# Patient Record
Sex: MALE | Race: WHITE | Employment: OTHER | URBAN - METROPOLITAN AREA
[De-identification: names, ages, dates, MRNs, and addresses within clinical notes are randomized per-mention and may not be internally consistent; named-entity substitution may affect disease eponyms.]

---

## 2018-04-11 ENCOUNTER — HOSPITAL ENCOUNTER (OUTPATIENT)
Age: 68
Discharge: HOME | End: 2018-04-13

## 2018-04-11 LAB
ANION GAP SERPL CALCULATED.3IONS-SCNC: 17 MMOL/L (ref 7–16)
BUN BLDV-MCNC: 17 MG/DL (ref 8–23)
CALCIUM SERPL-MCNC: 9.8 MG/DL (ref 8.6–10.2)
CHLORIDE BLD-SCNC: 96 MMOL/L (ref 98–107)
CO2: 22 MMOL/L (ref 22–29)
CREAT SERPL-MCNC: 0.9 MG/DL (ref 0.7–1.2)
GFR AFRICAN AMERICAN: >60
GFR NON-AFRICAN AMERICAN: >60 ML/MIN/1.73
GLUCOSE BLD-MCNC: 176 MG/DL (ref 74–109)
HCT VFR BLD CALC: 42.8 % (ref 37–54)
HEMOGLOBIN: 14 G/DL (ref 12.5–16.5)
MCH RBC QN AUTO: 28.5 PG (ref 26–35)
MCHC RBC AUTO-ENTMCNC: 32.7 % (ref 32–34.5)
MCV RBC AUTO: 87 FL (ref 80–99.9)
PDW BLD-RTO: 13.6 FL (ref 11.5–15)
PLATELET # BLD: 291 E9/L (ref 130–450)
PMV BLD AUTO: 11.4 FL (ref 7–12)
POTASSIUM SERPL-SCNC: 3.7 MMOL/L (ref 3.5–5)
RBC # BLD: 4.92 E12/L (ref 3.8–5.8)
SODIUM BLD-SCNC: 135 MMOL/L (ref 132–146)
WBC # BLD: 8.2 E9/L (ref 4.5–11.5)

## 2018-04-11 PROCEDURE — 87088 URINE BACTERIA CULTURE: CPT

## 2018-04-11 PROCEDURE — 85027 COMPLETE CBC AUTOMATED: CPT

## 2018-04-11 PROCEDURE — 87081 CULTURE SCREEN ONLY: CPT

## 2018-04-11 PROCEDURE — 80048 BASIC METABOLIC PNL TOTAL CA: CPT

## 2018-04-13 LAB — MRSA CULTURE ONLY: NORMAL

## 2018-04-14 LAB — URINE CULTURE, ROUTINE: NORMAL

## 2018-04-25 ENCOUNTER — HOSPITAL ENCOUNTER (OUTPATIENT)
Age: 68
Discharge: HOME | End: 2018-04-27

## 2018-04-25 LAB
ABO/RH: NORMAL
ANTIBODY SCREEN: NORMAL

## 2018-04-25 PROCEDURE — 86901 BLOOD TYPING SEROLOGIC RH(D): CPT

## 2018-04-25 PROCEDURE — 86900 BLOOD TYPING SEROLOGIC ABO: CPT

## 2018-04-25 PROCEDURE — 86850 RBC ANTIBODY SCREEN: CPT

## 2018-04-26 ENCOUNTER — HOSPITAL ENCOUNTER (OUTPATIENT)
Age: 68
Discharge: HOME | End: 2018-04-28

## 2018-04-26 LAB
ANION GAP SERPL CALCULATED.3IONS-SCNC: 13 MMOL/L (ref 7–16)
BUN BLDV-MCNC: 17 MG/DL (ref 8–23)
CALCIUM SERPL-MCNC: 8.9 MG/DL (ref 8.6–10.2)
CHLORIDE BLD-SCNC: 96 MMOL/L (ref 98–107)
CO2: 23 MMOL/L (ref 22–29)
CREAT SERPL-MCNC: 1 MG/DL (ref 0.7–1.2)
GFR AFRICAN AMERICAN: >60
GFR NON-AFRICAN AMERICAN: >60 ML/MIN/1.73
GLUCOSE BLD-MCNC: 137 MG/DL (ref 74–109)
HCT VFR BLD CALC: 33.5 % (ref 37–54)
HEMOGLOBIN: 11 G/DL (ref 12.5–16.5)
POTASSIUM SERPL-SCNC: 4.7 MMOL/L (ref 3.5–5)
SODIUM BLD-SCNC: 132 MMOL/L (ref 132–146)

## 2018-04-26 PROCEDURE — 85014 HEMATOCRIT: CPT

## 2018-04-26 PROCEDURE — 80048 BASIC METABOLIC PNL TOTAL CA: CPT

## 2018-04-26 PROCEDURE — 85018 HEMOGLOBIN: CPT

## 2018-07-07 ENCOUNTER — HOSPITAL ENCOUNTER (EMERGENCY)
Age: 68
Discharge: HOME | End: 2018-07-07
Attending: EMERGENCY MEDICINE
Payer: COMMERCIAL

## 2018-07-07 VITALS
SYSTOLIC BLOOD PRESSURE: 130 MMHG | TEMPERATURE: 98 F | HEIGHT: 69 IN | BODY MASS INDEX: 36.58 KG/M2 | WEIGHT: 247 LBS | RESPIRATION RATE: 18 BRPM | DIASTOLIC BLOOD PRESSURE: 76 MMHG | OXYGEN SATURATION: 97 % | HEART RATE: 76 BPM

## 2018-07-07 DIAGNOSIS — R25.2 LEG CRAMPING: Primary | ICD-10-CM

## 2018-07-07 PROCEDURE — 6360000002 HC RX W HCPCS: Performed by: EMERGENCY MEDICINE

## 2018-07-07 PROCEDURE — 99282 EMERGENCY DEPT VISIT SF MDM: CPT

## 2018-07-07 PROCEDURE — 96372 THER/PROPH/DIAG INJ SC/IM: CPT

## 2018-07-07 RX ORDER — IBUPROFEN 800 MG/1
800 TABLET ORAL EVERY 6 HOURS PRN
COMMUNITY
End: 2018-12-24 | Stop reason: ALTCHOICE

## 2018-07-07 RX ORDER — ORPHENADRINE CITRATE 30 MG/ML
60 INJECTION INTRAMUSCULAR; INTRAVENOUS ONCE
Status: COMPLETED | OUTPATIENT
Start: 2018-07-07 | End: 2018-07-07

## 2018-07-07 RX ORDER — ORPHENADRINE CITRATE 100 MG/1
100 TABLET, EXTENDED RELEASE ORAL 2 TIMES DAILY
Qty: 20 TABLET | Refills: 0 | Status: SHIPPED | OUTPATIENT
Start: 2018-07-07 | End: 2018-07-10 | Stop reason: ALTCHOICE

## 2018-07-07 RX ORDER — KETOROLAC TROMETHAMINE 30 MG/ML
30 INJECTION, SOLUTION INTRAMUSCULAR; INTRAVENOUS ONCE
Status: COMPLETED | OUTPATIENT
Start: 2018-07-07 | End: 2018-07-07

## 2018-07-07 RX ADMIN — ORPHENADRINE CITRATE 60 MG: 30 INJECTION INTRAMUSCULAR; INTRAVENOUS at 02:49

## 2018-07-07 RX ADMIN — KETOROLAC TROMETHAMINE 30 MG: 30 INJECTION, SOLUTION INTRAMUSCULAR at 02:49

## 2018-07-07 ASSESSMENT — PAIN DESCRIPTION - ORIENTATION
ORIENTATION: RIGHT;LEFT;LOWER
ORIENTATION: RIGHT;LEFT;LOWER

## 2018-07-07 ASSESSMENT — PAIN - FUNCTIONAL ASSESSMENT: PAIN_FUNCTIONAL_ASSESSMENT: 0-10

## 2018-07-07 ASSESSMENT — PAIN DESCRIPTION - PAIN TYPE
TYPE: ACUTE PAIN
TYPE: ACUTE PAIN

## 2018-07-07 ASSESSMENT — PAIN DESCRIPTION - PROGRESSION
CLINICAL_PROGRESSION: NOT CHANGED
CLINICAL_PROGRESSION: RAPIDLY IMPROVING

## 2018-07-07 ASSESSMENT — PAIN DESCRIPTION - DESCRIPTORS
DESCRIPTORS: ACHING
DESCRIPTORS: ACHING

## 2018-07-07 ASSESSMENT — PAIN DESCRIPTION - ONSET: ONSET: ON-GOING

## 2018-07-07 ASSESSMENT — PAIN SCALES - GENERAL
PAINLEVEL_OUTOF10: 8
PAINLEVEL_OUTOF10: 8
PAINLEVEL_OUTOF10: 2

## 2018-07-07 ASSESSMENT — PAIN DESCRIPTION - FREQUENCY
FREQUENCY: CONTINUOUS
FREQUENCY: INTERMITTENT

## 2018-07-07 ASSESSMENT — PAIN DESCRIPTION - LOCATION
LOCATION: LEG
LOCATION: LEG

## 2018-07-07 NOTE — ED PROVIDER NOTES
Constitutional:  Well developed, well nourished, no acute distress, non-toxic appearance , vitals are normal  Eyes: No scleral icterus, no conjunctival injection  HENT:  Atraumatic, external ears normal, nose normal, oropharynx moist. Neck- normal range of motion, no tenderness, supple   Respiratory:  No respiratory distress,   GI:  No abdominal distention  Musculoskeletal:  No edema, no deformities  Bilateral lower extremities-no swelling or deformity noted, no evidence of DVT, has some bilateral calf tenderness, has a scar on the left knee secondary to previous knee surgery. Capillary refill and sensation is intact distally without evidence of neurovascular compromise. .  Integument:  Well hydrated, no rash. Neurologic:  Alert, answers all questions appropriately ,no focal motor deficits noted. Psychiatric:  Speech and behavior appropriate                 ------------------------- NURSING NOTES AND VITALS REVIEWED ---------------------------   The nursing notes within the ED encounter and vital signs as below have been reviewed by myself. /76   Pulse 76   Temp 98 °F (36.7 °C) (Oral)   Resp 18   Ht 5' 9\" (1.753 m)   Wt 247 lb (112 kg)   SpO2 97%   BMI 36.48 kg/m²   Oxygen Saturation Interpretation: Normal    The patients available past medical records and past encounters were reviewed. ------------------------------ ED COURSE/MEDICAL DECISION MAKING----------------------  Medications   ketorolac (TORADOL) injection 30 mg (30 mg Intramuscular Given 7/7/18 0249)   orphenadrine (NORFLEX) injection 60 mg (60 mg Intramuscular Given 7/7/18 0249)       Medical Decision Making:    Has bilateral leg cramping/throbbing. No swelling and no evidence of DVT. No evidence of vascular or nerve compromise. Patient given injection of Toradol and Norflex with significant relief of his pain. He states his legs feel a little cold now but her nor having the pain.   Discharged home with prescription for

## 2018-12-03 ENCOUNTER — HOSPITAL ENCOUNTER (OUTPATIENT)
Age: 68
Discharge: HOME | End: 2018-12-05
Payer: COMMERCIAL

## 2018-12-03 PROCEDURE — 88305 TISSUE EXAM BY PATHOLOGIST: CPT

## 2018-12-24 ENCOUNTER — HOSPITAL ENCOUNTER (EMERGENCY)
Age: 68
Discharge: HOME | End: 2018-12-24
Attending: EMERGENCY MEDICINE
Payer: COMMERCIAL

## 2018-12-24 VITALS
TEMPERATURE: 97.5 F | WEIGHT: 252 LBS | DIASTOLIC BLOOD PRESSURE: 80 MMHG | HEIGHT: 70 IN | RESPIRATION RATE: 18 BRPM | SYSTOLIC BLOOD PRESSURE: 146 MMHG | OXYGEN SATURATION: 95 % | HEART RATE: 72 BPM | BODY MASS INDEX: 36.08 KG/M2

## 2018-12-24 DIAGNOSIS — M79.605 LEFT LEG PAIN: Primary | ICD-10-CM

## 2018-12-24 DIAGNOSIS — M79.604 RIGHT LEG PAIN: ICD-10-CM

## 2018-12-24 LAB
ANION GAP SERPL CALCULATED.3IONS-SCNC: 14 MMOL/L (ref 7–16)
BASOPHILS ABSOLUTE: 0.05 E9/L (ref 0–0.2)
BASOPHILS RELATIVE PERCENT: 0.7 % (ref 0–2)
BUN BLDV-MCNC: 17 MG/DL (ref 8–23)
CALCIUM SERPL-MCNC: 9.1 MG/DL (ref 8.6–10.2)
CHLORIDE BLD-SCNC: 98 MMOL/L (ref 98–107)
CO2: 26 MMOL/L (ref 22–29)
CREAT SERPL-MCNC: 1.1 MG/DL (ref 0.7–1.2)
EOSINOPHILS ABSOLUTE: 0.17 E9/L (ref 0.05–0.5)
EOSINOPHILS RELATIVE PERCENT: 2.3 % (ref 0–6)
GFR AFRICAN AMERICAN: >60
GFR NON-AFRICAN AMERICAN: >60 ML/MIN/1.73
GLUCOSE BLD-MCNC: 209 MG/DL (ref 74–99)
HCT VFR BLD CALC: 36.7 % (ref 37–54)
HEMOGLOBIN: 11.8 G/DL (ref 12.5–16.5)
IMMATURE GRANULOCYTES #: 0.05 E9/L
IMMATURE GRANULOCYTES %: 0.7 % (ref 0–5)
LYMPHOCYTES ABSOLUTE: 2.84 E9/L (ref 1.5–4)
LYMPHOCYTES RELATIVE PERCENT: 38.3 % (ref 20–42)
MCH RBC QN AUTO: 28.7 PG (ref 26–35)
MCHC RBC AUTO-ENTMCNC: 32.2 % (ref 32–34.5)
MCV RBC AUTO: 89.3 FL (ref 80–99.9)
MONOCYTES ABSOLUTE: 0.64 E9/L (ref 0.1–0.95)
MONOCYTES RELATIVE PERCENT: 8.6 % (ref 2–12)
NEUTROPHILS ABSOLUTE: 3.66 E9/L (ref 1.8–7.3)
NEUTROPHILS RELATIVE PERCENT: 49.4 % (ref 43–80)
PDW BLD-RTO: 13.1 FL (ref 11.5–15)
PLATELET # BLD: 233 E9/L (ref 130–450)
PMV BLD AUTO: 10.7 FL (ref 7–12)
POTASSIUM SERPL-SCNC: 4.2 MMOL/L (ref 3.5–5)
RBC # BLD: 4.11 E12/L (ref 3.8–5.8)
SODIUM BLD-SCNC: 138 MMOL/L (ref 132–146)
WBC # BLD: 7.4 E9/L (ref 4.5–11.5)

## 2018-12-24 PROCEDURE — 80048 BASIC METABOLIC PNL TOTAL CA: CPT

## 2018-12-24 PROCEDURE — 36415 COLL VENOUS BLD VENIPUNCTURE: CPT

## 2018-12-24 PROCEDURE — 6360000002 HC RX W HCPCS: Performed by: EMERGENCY MEDICINE

## 2018-12-24 PROCEDURE — 96372 THER/PROPH/DIAG INJ SC/IM: CPT

## 2018-12-24 PROCEDURE — 99283 EMERGENCY DEPT VISIT LOW MDM: CPT

## 2018-12-24 PROCEDURE — 85025 COMPLETE CBC W/AUTO DIFF WBC: CPT

## 2018-12-24 RX ORDER — METHOCARBAMOL 500 MG/1
500 TABLET, FILM COATED ORAL 4 TIMES DAILY
Qty: 40 TABLET | Refills: 0 | Status: SHIPPED | OUTPATIENT
Start: 2018-12-24 | End: 2019-01-03

## 2018-12-24 RX ORDER — ACETAMINOPHEN 325 MG/1
650 TABLET ORAL EVERY 6 HOURS PRN
COMMUNITY

## 2018-12-24 RX ORDER — MORPHINE SULFATE 2 MG/ML
2 INJECTION, SOLUTION INTRAMUSCULAR; INTRAVENOUS ONCE
Status: COMPLETED | OUTPATIENT
Start: 2018-12-24 | End: 2018-12-24

## 2018-12-24 RX ADMIN — MORPHINE SULFATE 2 MG: 2 INJECTION, SOLUTION INTRAMUSCULAR; INTRAVENOUS at 01:22

## 2018-12-24 ASSESSMENT — PAIN DESCRIPTION - ORIENTATION
ORIENTATION: RIGHT;LEFT
ORIENTATION: RIGHT

## 2018-12-24 ASSESSMENT — PAIN DESCRIPTION - LOCATION
LOCATION: LEG
LOCATION: LEG

## 2018-12-24 ASSESSMENT — PAIN DESCRIPTION - DESCRIPTORS: DESCRIPTORS: DISCOMFORT

## 2018-12-24 ASSESSMENT — PAIN DESCRIPTION - PAIN TYPE: TYPE: ACUTE PAIN

## 2018-12-24 ASSESSMENT — PAIN SCALES - GENERAL
PAINLEVEL_OUTOF10: 10
PAINLEVEL_OUTOF10: 2

## 2018-12-24 NOTE — ED NOTES
Instructions provided and questions answered. Prescriptions verified with patient.       Julius Bowie RN  12/24/18 5254

## 2018-12-24 NOTE — ED NOTES
C/o legs constantly moving without requip. Had flexeril and pain med last visit and it worked well.      Papo Schwartz RN  12/24/18 3970

## 2018-12-24 NOTE — ED PROVIDER NOTES
reviewed. Allergies: Vicodin [hydrocodone-acetaminophen]        ---------------------------------------------------PHYSICAL EXAM--------------------------------------    Constitutional:  Well developed, well nourished, no acute distress, non-toxic appearance   Musculoskeletal:  No edema, no tenderness, no deformities. Back- no tenderness  Integument:  Well hydrated, no rash. Adequate perfusion. Lymphatic:  No lymphadenopathy noted   Neurologic:  Alert & oriented x 3, CN 2-12 normal, normal motor function, normal sensory function, no focal deficits noted. Psychiatric:  Speech and behavior appropriate       -------------------------------------------------- RESULTS -------------------------------------------------  I have personally reviewed all laboratory and imaging results for this patient. Results are listed below.      LABS:  Results for orders placed or performed during the hospital encounter of 12/24/18   CBC Auto Differential   Result Value Ref Range    WBC 7.4 4.5 - 11.5 E9/L    RBC 4.11 3.80 - 5.80 E12/L    Hemoglobin 11.8 (L) 12.5 - 16.5 g/dL    Hematocrit 36.7 (L) 37.0 - 54.0 %    MCV 89.3 80.0 - 99.9 fL    MCH 28.7 26.0 - 35.0 pg    MCHC 32.2 32.0 - 34.5 %    RDW 13.1 11.5 - 15.0 fL    Platelets 986 972 - 066 E9/L    MPV 10.7 7.0 - 12.0 fL    Neutrophils % 49.4 43.0 - 80.0 %    Immature Granulocytes % 0.7 0.0 - 5.0 %    Lymphocytes % 38.3 20.0 - 42.0 %    Monocytes % 8.6 2.0 - 12.0 %    Eosinophils % 2.3 0.0 - 6.0 %    Basophils % 0.7 0.0 - 2.0 %    Neutrophils # 3.66 1.80 - 7.30 E9/L    Immature Granulocytes # 0.05 E9/L    Lymphocytes # 2.84 1.50 - 4.00 E9/L    Monocytes # 0.64 0.10 - 0.95 E9/L    Eosinophils # 0.17 0.05 - 0.50 E9/L    Basophils # 0.05 0.00 - 0.20 O2/R   Basic Metabolic Panel   Result Value Ref Range    Sodium 138 132 - 146 mmol/L    Potassium 4.2 3.5 - 5.0 mmol/L    Chloride 98 98 - 107 mmol/L    CO2 26 22 - 29 mmol/L    Anion Gap 14 7 - 16 mmol/L    Glucose 209 (H) 74 - 99

## 2019-03-30 ENCOUNTER — HOSPITAL ENCOUNTER (EMERGENCY)
Age: 69
Discharge: HOME | End: 2019-03-30
Payer: COMMERCIAL

## 2019-03-30 VITALS
BODY MASS INDEX: 38.51 KG/M2 | WEIGHT: 260 LBS | HEART RATE: 96 BPM | TEMPERATURE: 98.7 F | RESPIRATION RATE: 18 BRPM | HEIGHT: 69 IN | SYSTOLIC BLOOD PRESSURE: 110 MMHG | DIASTOLIC BLOOD PRESSURE: 68 MMHG | OXYGEN SATURATION: 96 %

## 2019-03-30 DIAGNOSIS — M54.50 ACUTE EXACERBATION OF CHRONIC LOW BACK PAIN: Primary | ICD-10-CM

## 2019-03-30 DIAGNOSIS — G89.29 ACUTE EXACERBATION OF CHRONIC LOW BACK PAIN: Primary | ICD-10-CM

## 2019-03-30 PROCEDURE — 96372 THER/PROPH/DIAG INJ SC/IM: CPT

## 2019-03-30 PROCEDURE — 99283 EMERGENCY DEPT VISIT LOW MDM: CPT

## 2019-03-30 PROCEDURE — 6360000002 HC RX W HCPCS: Performed by: NURSE PRACTITIONER

## 2019-03-30 RX ORDER — KETOROLAC TROMETHAMINE 30 MG/ML
30 INJECTION, SOLUTION INTRAMUSCULAR; INTRAVENOUS ONCE
Status: COMPLETED | OUTPATIENT
Start: 2019-03-30 | End: 2019-03-30

## 2019-03-30 RX ORDER — MORPHINE SULFATE 4 MG/ML
4 INJECTION, SOLUTION INTRAMUSCULAR; INTRAVENOUS ONCE
Status: COMPLETED | OUTPATIENT
Start: 2019-03-30 | End: 2019-03-30

## 2019-03-30 RX ADMIN — KETOROLAC TROMETHAMINE 30 MG: 30 INJECTION INTRAMUSCULAR; INTRAVENOUS at 21:00

## 2019-03-30 RX ADMIN — MORPHINE SULFATE 4 MG: 4 INJECTION INTRAVENOUS at 21:00

## 2019-03-30 ASSESSMENT — PAIN SCALES - GENERAL
PAINLEVEL_OUTOF10: 5
PAINLEVEL_OUTOF10: 10

## 2019-03-30 ASSESSMENT — PAIN DESCRIPTION - FREQUENCY: FREQUENCY: CONTINUOUS

## 2019-03-30 ASSESSMENT — PAIN DESCRIPTION - DESCRIPTORS: DESCRIPTORS: THROBBING

## 2019-03-30 ASSESSMENT — PAIN DESCRIPTION - LOCATION: LOCATION: LEG

## 2019-03-30 ASSESSMENT — PAIN DESCRIPTION - PAIN TYPE: TYPE: ACUTE PAIN

## 2019-03-30 ASSESSMENT — PAIN DESCRIPTION - ORIENTATION: ORIENTATION: LEFT;RIGHT

## 2019-03-31 NOTE — ED PROVIDER NOTES
Temp 98.7 °F (37.1 °C) (Oral)   Resp 18   Ht 5' 9\" (1.753 m)   Wt 260 lb (117.9 kg)   SpO2 96%   BMI 38.40 kg/m²   Oxygen Saturation Interpretation: Normal    The patients available past medical records and past encounters were reviewed. Physical exam:    Constitutional:  The patient is comfortable, well appearing, non toxic in NAD. Patient is alert and oriented x3. Head: Head is atraumatic and normocephalic. Eyes: There is no discharge from the eyes. Sclerae are normal.  ENT: The oropharynx is normal. The mouth is normal to inspection. Neck: Normal range of motion of the neck is present there is no JVD present no meningeal signs are present. Respiratory/chest: The chest is nontender breath sounds are normal  Cardiovascular: Regular rate and rhythm is noted. No murmurs. No rubs or gallops. Skin: Skin is warm and dry, Skin exam normal  Neurologic exam: The patient's Franklin Coma Scale is 15. No focal motor deficits. There are no focal sensory deficits. Deep tendon reflexes are intact and present bilaterally in the lower extremities. Babinski is absent. Gait is normal. Symmetric strength and sensation in the lower extremities bilaterally. Back exam: The patient has reproducible tenderness to palpation in the paravertebral lumbar region. There is no evidence of localized erythema nor is there any focal warmth to the back. The patient has no evidence of single vertebral tenderness or any single area of interspace tenderness. There are no palpable deformities, lacerations, abrasions, or stepoffs. No midline cervical, thoracic, or lumbar spine tenderness.           -------------------------------------------------- RESULTS -------------------------------------------------  I have personally reviewed all laboratory and imaging results for this patient. Results are listed below. LABS:  No results found for this visit on 03/30/19.     RADIOLOGY:  Interpreted by Radiologist.  No orders to display Medical decision making:   exacerbation of low back pain  without evidence of fracture or neurologic compromise.  No new injury or trauma.      Plan:  Review of past medical records for appropriate pain control and outpatient referral.        Impression:    Acute exacerbation of chronic low back pain    Disposition:  Discharge    Condition:  Stable            SHIRA Zambrano - MIMI  03/30/19 [id number]

## 2019-05-26 ENCOUNTER — HOSPITAL ENCOUNTER (EMERGENCY)
Age: 69
Discharge: HOME | End: 2019-05-26
Attending: EMERGENCY MEDICINE
Payer: COMMERCIAL

## 2019-05-26 VITALS
HEIGHT: 70 IN | HEART RATE: 88 BPM | SYSTOLIC BLOOD PRESSURE: 130 MMHG | BODY MASS INDEX: 37.37 KG/M2 | OXYGEN SATURATION: 95 % | RESPIRATION RATE: 18 BRPM | WEIGHT: 261 LBS | DIASTOLIC BLOOD PRESSURE: 86 MMHG | TEMPERATURE: 98.7 F

## 2019-05-26 DIAGNOSIS — M54.50 ACUTE EXACERBATION OF CHRONIC LOW BACK PAIN: Primary | ICD-10-CM

## 2019-05-26 DIAGNOSIS — G89.29 ACUTE EXACERBATION OF CHRONIC LOW BACK PAIN: Primary | ICD-10-CM

## 2019-05-26 PROCEDURE — 96372 THER/PROPH/DIAG INJ SC/IM: CPT

## 2019-05-26 PROCEDURE — 99282 EMERGENCY DEPT VISIT SF MDM: CPT

## 2019-05-26 PROCEDURE — 6360000002 HC RX W HCPCS: Performed by: EMERGENCY MEDICINE

## 2019-05-26 RX ORDER — KETOROLAC TROMETHAMINE 30 MG/ML
30 INJECTION, SOLUTION INTRAMUSCULAR; INTRAVENOUS ONCE
Status: COMPLETED | OUTPATIENT
Start: 2019-05-26 | End: 2019-05-26

## 2019-05-26 RX ORDER — MORPHINE SULFATE 4 MG/ML
4 INJECTION, SOLUTION INTRAMUSCULAR; INTRAVENOUS ONCE
Status: COMPLETED | OUTPATIENT
Start: 2019-05-26 | End: 2019-05-26

## 2019-05-26 RX ADMIN — MORPHINE SULFATE 4 MG: 4 INJECTION INTRAVENOUS at 22:46

## 2019-05-26 RX ADMIN — KETOROLAC TROMETHAMINE 30 MG: 30 INJECTION, SOLUTION INTRAMUSCULAR at 22:46

## 2019-05-26 ASSESSMENT — PAIN DESCRIPTION - PAIN TYPE: TYPE: CHRONIC PAIN

## 2019-05-26 ASSESSMENT — PAIN DESCRIPTION - LOCATION: LOCATION: BACK

## 2019-05-26 ASSESSMENT — PAIN SCALES - GENERAL: PAINLEVEL_OUTOF10: 10

## 2019-05-26 ASSESSMENT — PAIN DESCRIPTION - ORIENTATION: ORIENTATION: LOWER

## 2019-05-26 ASSESSMENT — PAIN DESCRIPTION - DESCRIPTORS: DESCRIPTORS: CONSTANT;THROBBING

## 2019-05-26 ASSESSMENT — PAIN DESCRIPTION - FREQUENCY: FREQUENCY: CONTINUOUS

## 2019-05-27 NOTE — ED PROVIDER NOTES
Department of Emergency Medicine   ED  Provider Note  Admit Date/RoomTime: 5/26/2019 10:11 PM  ED Room: 10/10          History of Present Illness:  5/26/19, Time: 10:35 PM         Nicol Harrell is a 79 y.o. male presenting to the ED for back pain, beginning earlier today. The complaint has been persistent, moderate in severity, and worsened by changing position, bending, twisting. Pt states that he has a hx of chronic lower back pain with chronic numbness radiating to his legs. Reports that he has had surgery to his back and was recently seen by Dr. Mio Rosales. Was told that he might need a stimulator placed due to loose screws in his back. Reports that he was advised to rest. States that today, he noticed he started to have worsening pain. Pt has been in the ED for his back pain and was treated. Also had pain medication prescribed by his PCP in the past. Denies recent injury, chest pain, shortness of breath, fever, chills, abdominal pain, nausea, emesis, diarrhea, and further complaints at this time. Review of Systems:   Pertinent positives and negatives are stated within HPI, all other systems reviewed and are negative.    --------------------------------------------- PAST HISTORY ---------------------------------------------  Past Medical History:  has a past medical history of Anxiety, Diabetes mellitus ([facility] [address] Hyperlipidemia, and Hypertension. Past Surgical History:  has a past surgical history that includes back surgery; knee surgery; other surgical history (5/8/2014); Carpal tunnel release; and hip surgery (Right). Social History:  reports that he quit smoking about 5 years ago. He has a 20.00 pack-year smoking history. He has never used smokeless tobacco. He reports that he does not drink alcohol or use drugs. Family History: family history is not on file. The patients home medications have been reviewed.     Allergies: Vicodin screws in his back. Had Toradol and Morphine for pain. After medicating, patient states that he feels improved. Emergency provider has shared the specific conditions for return, as well as the importance of follow-up with PCP. Counseling: The emergency provider has spoken with the patient and discussed todays results, in addition to providing specific details for the plan of care and counseling regarding the diagnosis and prognosis. Questions are answered at this time and they are agreeable with the plan.      --------------------------------- IMPRESSION AND DISPOSITION ---------------------------------    IMPRESSION  1. Acute exacerbation of chronic low back pain        DISPOSITION  Disposition: Discharge to home  Patient condition is good    5/26/19, 10:35 PM.    This note is prepared by Tawanda Ash acting as Scribe for Sadaf Navarrete DO. Sadaf Navarrete DO:  The scribe's documentation has been prepared under my direction and personally reviewed by me in its entirety. I confirm that the note above accurately reflects all work, treatment, procedures, and medical decision making performed by me.            Sadaf Navarrete DO  05/27/19 0130

## 2020-06-02 ENCOUNTER — HOSPITAL ENCOUNTER (OUTPATIENT)
Age: 70
Discharge: HOME | End: 2020-06-04

## 2020-06-02 LAB
ABO/RH: NORMAL
ANTIBODY SCREEN: NORMAL

## 2020-06-02 PROCEDURE — 86850 RBC ANTIBODY SCREEN: CPT

## 2020-06-02 PROCEDURE — 86901 BLOOD TYPING SEROLOGIC RH(D): CPT

## 2020-06-02 PROCEDURE — 86900 BLOOD TYPING SEROLOGIC ABO: CPT

## 2020-06-11 ENCOUNTER — HOSPITAL ENCOUNTER (OUTPATIENT)
Age: 70
Discharge: HOME | End: 2020-06-13

## 2020-06-11 LAB
ANION GAP SERPL CALCULATED.3IONS-SCNC: 12 MMOL/L (ref 7–16)
BUN BLDV-MCNC: 21 MG/DL (ref 8–23)
CALCIUM SERPL-MCNC: 8.7 MG/DL (ref 8.6–10.2)
CHLORIDE BLD-SCNC: 102 MMOL/L (ref 98–107)
CO2: 20 MMOL/L (ref 22–29)
CREAT SERPL-MCNC: 0.9 MG/DL (ref 0.7–1.2)
GFR AFRICAN AMERICAN: >60
GFR NON-AFRICAN AMERICAN: >60 ML/MIN/1.73
GLUCOSE BLD-MCNC: 218 MG/DL (ref 74–99)
HCT VFR BLD CALC: 34.1 % (ref 37–54)
HEMOGLOBIN: 10.8 G/DL (ref 12.5–16.5)
POTASSIUM SERPL-SCNC: 4.2 MMOL/L (ref 3.5–5)
SODIUM BLD-SCNC: 134 MMOL/L (ref 132–146)

## 2020-06-11 PROCEDURE — 80048 BASIC METABOLIC PNL TOTAL CA: CPT

## 2020-06-11 PROCEDURE — 85014 HEMATOCRIT: CPT

## 2020-06-11 PROCEDURE — 85018 HEMOGLOBIN: CPT

## 2020-08-02 ENCOUNTER — HOSPITAL ENCOUNTER (EMERGENCY)
Age: 70
Discharge: HOME | End: 2020-08-02
Attending: EMERGENCY MEDICINE
Payer: COMMERCIAL

## 2020-08-02 VITALS
WEIGHT: 260 LBS | HEIGHT: 69 IN | SYSTOLIC BLOOD PRESSURE: 130 MMHG | HEART RATE: 87 BPM | BODY MASS INDEX: 38.51 KG/M2 | OXYGEN SATURATION: 98 % | RESPIRATION RATE: 16 BRPM | TEMPERATURE: 96.8 F | DIASTOLIC BLOOD PRESSURE: 82 MMHG

## 2020-08-02 PROBLEM — G89.29 CHRONIC RIGHT-SIDED LOW BACK PAIN WITH SCIATICA: Status: ACTIVE | Noted: 2020-08-02

## 2020-08-02 PROBLEM — M54.40 CHRONIC RIGHT-SIDED LOW BACK PAIN WITH SCIATICA: Status: ACTIVE | Noted: 2020-08-02

## 2020-08-02 PROBLEM — M25.561 ACUTE PAIN OF RIGHT KNEE: Status: ACTIVE | Noted: 2020-08-02

## 2020-08-02 PROBLEM — M54.31 SCIATICA OF RIGHT SIDE: Status: ACTIVE | Noted: 2020-08-02

## 2020-08-02 PROCEDURE — 99283 EMERGENCY DEPT VISIT LOW MDM: CPT

## 2020-08-02 PROCEDURE — 6360000002 HC RX W HCPCS: Performed by: EMERGENCY MEDICINE

## 2020-08-02 PROCEDURE — 96372 THER/PROPH/DIAG INJ SC/IM: CPT

## 2020-08-02 PROCEDURE — 6370000000 HC RX 637 (ALT 250 FOR IP): Performed by: EMERGENCY MEDICINE

## 2020-08-02 RX ORDER — NALOXONE HYDROCHLORIDE 4 MG/.1ML
SPRAY NASAL
Status: DISCONTINUED
Start: 2020-08-02 | End: 2020-08-02 | Stop reason: WASHOUT

## 2020-08-02 RX ORDER — NALOXONE HYDROCHLORIDE 1 MG/ML
INJECTION INTRAMUSCULAR; INTRAVENOUS; SUBCUTANEOUS
Status: DISCONTINUED
Start: 2020-08-02 | End: 2020-08-02 | Stop reason: WASHOUT

## 2020-08-02 RX ORDER — KETOROLAC TROMETHAMINE 30 MG/ML
60 INJECTION, SOLUTION INTRAMUSCULAR; INTRAVENOUS ONCE
Status: COMPLETED | OUTPATIENT
Start: 2020-08-02 | End: 2020-08-02

## 2020-08-02 RX ORDER — OXYCODONE HYDROCHLORIDE AND ACETAMINOPHEN 5; 325 MG/1; MG/1
1 TABLET ORAL EVERY 6 HOURS PRN
Qty: 12 TABLET | Refills: 0 | Status: SHIPPED | OUTPATIENT
Start: 2020-08-02 | End: 2020-08-09

## 2020-08-02 RX ORDER — OXYCODONE HYDROCHLORIDE AND ACETAMINOPHEN 5; 325 MG/1; MG/1
2 TABLET ORAL ONCE
Status: COMPLETED | OUTPATIENT
Start: 2020-08-02 | End: 2020-08-02

## 2020-08-02 RX ADMIN — OXYCODONE AND ACETAMINOPHEN 2 TABLET: 5; 325 TABLET ORAL at 00:54

## 2020-08-02 RX ADMIN — KETOROLAC TROMETHAMINE 60 MG: 30 INJECTION, SOLUTION INTRAMUSCULAR at 00:54

## 2020-08-02 ASSESSMENT — ENCOUNTER SYMPTOMS
EYE DISCHARGE: 0
COUGH: 0
DIARRHEA: 0
WHEEZING: 0
VOMITING: 0
SINUS PRESSURE: 0
SHORTNESS OF BREATH: 0
NAUSEA: 0
EYE REDNESS: 0
ABDOMINAL PAIN: 0
BACK PAIN: 1
EYE PAIN: 0
SORE THROAT: 0

## 2020-08-02 ASSESSMENT — PAIN DESCRIPTION - LOCATION: LOCATION: BACK;KNEE

## 2020-08-02 ASSESSMENT — PAIN SCALES - GENERAL
PAINLEVEL_OUTOF10: 10
PAINLEVEL_OUTOF10: 4
PAINLEVEL_OUTOF10: 10

## 2020-08-02 ASSESSMENT — PAIN DESCRIPTION - ORIENTATION: ORIENTATION: RIGHT

## 2020-08-02 NOTE — ED PROVIDER NOTES
CHRONIC LOWER BACK PAIN WITH SCIATICA RADIATING TO RIGHT LEG. NO NEW TRAUMA NO LEG SWELLING NO CALF PAIN  URINATING NORMALLY BM NORMAL. NO FEVER. WORSE WITH MOVEMENT NO ABDOMINAL PAIN THESE SYMPTOMS FOR SEVERAL MONTHS    The history is provided by the patient. Review of Systems   Constitutional: Negative for chills and fever. HENT: Negative for ear pain, sinus pressure and sore throat. Eyes: Negative for pain, discharge and redness. Respiratory: Negative for cough, shortness of breath and wheezing. Cardiovascular: Negative for chest pain. Gastrointestinal: Negative for abdominal pain, diarrhea, nausea and vomiting. Genitourinary: Negative for dysuria and frequency. Musculoskeletal: Positive for back pain. Negative for arthralgias. Skin: Negative for rash and wound. Neurological: Negative for weakness and headaches. Hematological: Negative for adenopathy. All other systems reviewed and are negative. Physical Exam  Vitals signs and nursing note reviewed. Constitutional:       Appearance: He is well-developed. HENT:      Head: Normocephalic and atraumatic. Jaw: No trismus. Right Ear: Hearing and external ear normal.      Left Ear: Hearing and external ear normal.      Nose: Nose normal.      Right Sinus: No maxillary sinus tenderness or frontal sinus tenderness. Left Sinus: No maxillary sinus tenderness or frontal sinus tenderness. Mouth/Throat:      Pharynx: Uvula midline. No uvula swelling. Eyes:      General: Lids are normal.      Conjunctiva/sclera: Conjunctivae normal.      Pupils: Pupils are equal, round, and reactive to light. Neck:      Musculoskeletal: Normal range of motion and neck supple. Cardiovascular:      Rate and Rhythm: Normal rate and regular rhythm. Heart sounds: Normal heart sounds. No murmur. Pulmonary:      Effort: Pulmonary effort is normal.      Breath sounds: Normal breath sounds.    Abdominal:      General: Bowel sounds are normal.      Palpations: Abdomen is soft. Abdomen is not rigid. Tenderness: There is no abdominal tenderness. There is no guarding or rebound. Skin:     General: Skin is warm and dry. Findings: No abrasion or rash. Neurological:      Mental Status: He is alert and oriented to person, place, and time. GCS: GCS eye subscore is 4. GCS verbal subscore is 5. GCS motor subscore is 6. Cranial Nerves: No cranial nerve deficit. Sensory: No sensory deficit. Coordination: Coordination normal.      Gait: Gait normal.         Procedures    MDM        --------------------------------------------- PAST HISTORY ---------------------------------------------  Past Medical History:  has a past medical history of Anxiety, Diabetes mellitus ([facility] [address] Hyperlipidemia, and Hypertension. Past Surgical History:  has a past surgical history that includes back surgery; knee surgery; other surgical history (5/8/2014); Carpal tunnel release; and hip surgery (Right). Social History:  reports that he quit smoking about 6 years ago. He has a 20.00 pack-year smoking history. He has never used smokeless tobacco. He reports that he does not drink alcohol or use drugs. Family History: family history is not on file. The patients home medications have been reviewed. Allergies: Vicodin [hydrocodone-acetaminophen]    -------------------------------------------------- RESULTS -------------------------------------------------  No results found for this visit on 08/02/20. No orders to display       ------------------------- NURSING NOTES AND VITALS REVIEWED ---------------------------   The nursing notes within the ED encounter and vital signs as below have been reviewed.    /82   Pulse 87   Temp 96.8 °F (36 °C) (Temporal)   Resp 16   SpO2 98%   Oxygen Saturation Interpretation: Normal      ------------------------------------------ PROGRESS NOTES ------------------------------------------   I have

## 2020-08-04 ENCOUNTER — HOSPITAL ENCOUNTER (EMERGENCY)
Age: 70
Discharge: HOME | End: 2020-08-04
Attending: EMERGENCY MEDICINE
Payer: COMMERCIAL

## 2020-08-04 ENCOUNTER — APPOINTMENT (OUTPATIENT)
Dept: CT IMAGING | Age: 70
End: 2020-08-04
Payer: COMMERCIAL

## 2020-08-04 VITALS
OXYGEN SATURATION: 98 % | TEMPERATURE: 98.6 F | HEART RATE: 81 BPM | DIASTOLIC BLOOD PRESSURE: 75 MMHG | SYSTOLIC BLOOD PRESSURE: 144 MMHG | HEIGHT: 69 IN | RESPIRATION RATE: 16 BRPM | BODY MASS INDEX: 37.03 KG/M2 | WEIGHT: 250 LBS

## 2020-08-04 LAB
ALBUMIN SERPL-MCNC: 4.6 G/DL (ref 3.5–5.2)
ALP BLD-CCNC: 105 U/L (ref 40–129)
ALT SERPL-CCNC: 18 U/L (ref 0–40)
ANION GAP SERPL CALCULATED.3IONS-SCNC: 16 MMOL/L (ref 7–16)
AST SERPL-CCNC: 26 U/L (ref 0–39)
BACTERIA: NORMAL /HPF
BASOPHILS ABSOLUTE: 0.07 E9/L (ref 0–0.2)
BASOPHILS RELATIVE PERCENT: 0.6 % (ref 0–2)
BILIRUB SERPL-MCNC: 0.5 MG/DL (ref 0–1.2)
BILIRUBIN URINE: NEGATIVE
BLOOD, URINE: NORMAL
BUN BLDV-MCNC: 11 MG/DL (ref 8–23)
CALCIUM SERPL-MCNC: 10.1 MG/DL (ref 8.6–10.2)
CHLORIDE BLD-SCNC: 92 MMOL/L (ref 98–107)
CLARITY: CLEAR
CO2: 25 MMOL/L (ref 22–29)
COLOR: YELLOW
CREAT SERPL-MCNC: 0.7 MG/DL (ref 0.7–1.2)
EOSINOPHILS ABSOLUTE: 0.1 E9/L (ref 0.05–0.5)
EOSINOPHILS RELATIVE PERCENT: 0.8 % (ref 0–6)
GFR AFRICAN AMERICAN: >60
GFR NON-AFRICAN AMERICAN: >60 ML/MIN/1.73
GLUCOSE BLD-MCNC: 104 MG/DL (ref 74–99)
GLUCOSE URINE: NEGATIVE MG/DL
HCT VFR BLD CALC: 40.7 % (ref 37–54)
HEMOGLOBIN: 13.1 G/DL (ref 12.5–16.5)
IMMATURE GRANULOCYTES #: 0.08 E9/L
IMMATURE GRANULOCYTES %: 0.6 % (ref 0–5)
KETONES, URINE: NEGATIVE MG/DL
LACTIC ACID: 1.6 MMOL/L (ref 0.5–2.2)
LEUKOCYTE ESTERASE, URINE: NEGATIVE
LIPASE: 13 U/L (ref 13–60)
LYMPHOCYTES ABSOLUTE: 2.17 E9/L (ref 1.5–4)
LYMPHOCYTES RELATIVE PERCENT: 17.2 % (ref 20–42)
MCH RBC QN AUTO: 27.2 PG (ref 26–35)
MCHC RBC AUTO-ENTMCNC: 32.2 % (ref 32–34.5)
MCV RBC AUTO: 84.4 FL (ref 80–99.9)
MONOCYTES ABSOLUTE: 0.91 E9/L (ref 0.1–0.95)
MONOCYTES RELATIVE PERCENT: 7.2 % (ref 2–12)
NEUTROPHILS ABSOLUTE: 9.26 E9/L (ref 1.8–7.3)
NEUTROPHILS RELATIVE PERCENT: 73.6 % (ref 43–80)
NITRITE, URINE: NEGATIVE
PDW BLD-RTO: 13.6 FL (ref 11.5–15)
PH UA: 6 (ref 5–9)
PLATELET # BLD: 312 E9/L (ref 130–450)
PMV BLD AUTO: 10.7 FL (ref 7–12)
POTASSIUM REFLEX MAGNESIUM: 4.4 MMOL/L (ref 3.5–5)
PROTEIN UA: NEGATIVE MG/DL
RBC # BLD: 4.82 E12/L (ref 3.8–5.8)
RBC UA: NORMAL /HPF (ref 0–2)
SODIUM BLD-SCNC: 133 MMOL/L (ref 132–146)
SPECIFIC GRAVITY UA: 1.01 (ref 1–1.03)
TOTAL PROTEIN: 8.1 G/DL (ref 6.4–8.3)
TROPONIN: <0.01 NG/ML (ref 0–0.03)
UROBILINOGEN, URINE: 0.2 E.U./DL
WBC # BLD: 12.6 E9/L (ref 4.5–11.5)
WBC UA: NORMAL /HPF (ref 0–5)

## 2020-08-04 PROCEDURE — 99285 EMERGENCY DEPT VISIT HI MDM: CPT

## 2020-08-04 PROCEDURE — C9113 INJ PANTOPRAZOLE SODIUM, VIA: HCPCS | Performed by: EMERGENCY MEDICINE

## 2020-08-04 PROCEDURE — 6360000004 HC RX CONTRAST MEDICATION: Performed by: RADIOLOGY

## 2020-08-04 PROCEDURE — 93005 ELECTROCARDIOGRAM TRACING: CPT | Performed by: EMERGENCY MEDICINE

## 2020-08-04 PROCEDURE — 83690 ASSAY OF LIPASE: CPT

## 2020-08-04 PROCEDURE — 83605 ASSAY OF LACTIC ACID: CPT

## 2020-08-04 PROCEDURE — 85025 COMPLETE CBC W/AUTO DIFF WBC: CPT

## 2020-08-04 PROCEDURE — 84484 ASSAY OF TROPONIN QUANT: CPT

## 2020-08-04 PROCEDURE — 6360000002 HC RX W HCPCS: Performed by: EMERGENCY MEDICINE

## 2020-08-04 PROCEDURE — 80053 COMPREHEN METABOLIC PANEL: CPT

## 2020-08-04 PROCEDURE — 96375 TX/PRO/DX INJ NEW DRUG ADDON: CPT

## 2020-08-04 PROCEDURE — 74177 CT ABD & PELVIS W/CONTRAST: CPT

## 2020-08-04 PROCEDURE — 81001 URINALYSIS AUTO W/SCOPE: CPT

## 2020-08-04 PROCEDURE — 96374 THER/PROPH/DIAG INJ IV PUSH: CPT

## 2020-08-04 PROCEDURE — 71275 CT ANGIOGRAPHY CHEST: CPT

## 2020-08-04 RX ORDER — MORPHINE SULFATE 4 MG/ML
4 INJECTION, SOLUTION INTRAMUSCULAR; INTRAVENOUS ONCE
Status: COMPLETED | OUTPATIENT
Start: 2020-08-04 | End: 2020-08-04

## 2020-08-04 RX ORDER — PANTOPRAZOLE SODIUM 40 MG/10ML
40 INJECTION, POWDER, LYOPHILIZED, FOR SOLUTION INTRAVENOUS ONCE
Status: COMPLETED | OUTPATIENT
Start: 2020-08-04 | End: 2020-08-04

## 2020-08-04 RX ORDER — OMEPRAZOLE 20 MG/1
20 CAPSULE, DELAYED RELEASE ORAL
Qty: 60 CAPSULE | Refills: 0 | Status: SHIPPED | OUTPATIENT
Start: 2020-08-04 | End: 2021-05-25

## 2020-08-04 RX ORDER — SUCRALFATE 1 G/1
1 TABLET ORAL 4 TIMES DAILY
Qty: 120 TABLET | Refills: 3 | Status: SHIPPED | OUTPATIENT
Start: 2020-08-04 | End: 2021-02-02 | Stop reason: ALTCHOICE

## 2020-08-04 RX ADMIN — PANTOPRAZOLE SODIUM 40 MG: 40 INJECTION, POWDER, FOR SOLUTION INTRAVENOUS at 14:20

## 2020-08-04 RX ADMIN — MORPHINE SULFATE 4 MG: 4 INJECTION, SOLUTION INTRAMUSCULAR; INTRAVENOUS at 14:20

## 2020-08-04 RX ADMIN — IOPAMIDOL 110 ML: 755 INJECTION, SOLUTION INTRAVENOUS at 14:58

## 2020-08-04 ASSESSMENT — PAIN DESCRIPTION - PROGRESSION: CLINICAL_PROGRESSION: GRADUALLY WORSENING

## 2020-08-04 ASSESSMENT — PAIN SCALES - GENERAL
PAINLEVEL_OUTOF10: 7
PAINLEVEL_OUTOF10: 7

## 2020-08-04 ASSESSMENT — PAIN DESCRIPTION - LOCATION: LOCATION: ABDOMEN

## 2020-08-04 ASSESSMENT — PAIN DESCRIPTION - ONSET: ONSET: GRADUAL

## 2020-08-04 ASSESSMENT — PAIN DESCRIPTION - FREQUENCY: FREQUENCY: CONTINUOUS

## 2020-08-04 ASSESSMENT — PAIN DESCRIPTION - ORIENTATION: ORIENTATION: MID;UPPER

## 2020-08-04 ASSESSMENT — PAIN DESCRIPTION - DESCRIPTORS: DESCRIPTORS: ACHING;SHARP

## 2020-08-04 ASSESSMENT — PAIN DESCRIPTION - PAIN TYPE: TYPE: ACUTE PAIN

## 2020-08-04 NOTE — ED PROVIDER NOTES
HPI:  8/4/20,   Time: 1:33 PM EDT         Kathleen Turner is a 78 y.o. male presenting to the ED for epigastric discomfort radiating into his back which has been associated intermittently with some shortness of breath and urinary frequency, beginning 3 days ago 20 minutes after eating. The complaint has been persistent, severe in severity, and worsened by eating greasy food. No vomiting diarrhea melena hematemesis hematochezia dysuria hematuria. Patient is 1 month status post knee replacement and is not on any anticoagulation    ROS:   Pertinent positives and negatives are stated within HPI, all other systems reviewed and are negative.  --------------------------------------------- PAST HISTORY ---------------------------------------------  Past Medical History:  has a past medical history of Anxiety, Diabetes mellitus ([facility] [address] Hyperlipidemia, and Hypertension. Past Surgical History:  has a past surgical history that includes back surgery; knee surgery; other surgical history (5/8/2014); Carpal tunnel release; and hip surgery (Right). Social History:  reports that he quit smoking about 6 years ago. He has a 20.00 pack-year smoking history. He has never used smokeless tobacco. He reports that he does not drink alcohol or use drugs. Family History: family history is not on file. The patients home medications have been reviewed.     Allergies: Vicodin [hydrocodone-acetaminophen]    -------------------------------------------------- RESULTS -------------------------------------------------  All laboratory and radiology results have been personally reviewed by myself   LABS:  Results for orders placed or performed during the hospital encounter of 08/04/20   CBC Auto Differential   Result Value Ref Range    WBC 12.6 (H) 4.5 - 11.5 E9/L    RBC 4.82 3.80 - 5.80 E12/L    Hemoglobin 13.1 12.5 - 16.5 g/dL    Hematocrit 40.7 37.0 - 54.0 %    MCV 84.4 80.0 - 99.9 fL    MCH 27.2 26.0 - 35.0 pg    MCHC 32.2 32.0 - 34.5 % RDW 13.6 11.5 - 15.0 fL    Platelets 835 747 - 010 E9/L    MPV 10.7 7.0 - 12.0 fL    Neutrophils % 73.6 43.0 - 80.0 %    Immature Granulocytes % 0.6 0.0 - 5.0 %    Lymphocytes % 17.2 (L) 20.0 - 42.0 %    Monocytes % 7.2 2.0 - 12.0 %    Eosinophils % 0.8 0.0 - 6.0 %    Basophils % 0.6 0.0 - 2.0 %    Neutrophils Absolute 9.26 (H) 1.80 - 7.30 E9/L    Immature Granulocytes # 0.08 E9/L    Lymphocytes Absolute 2.17 1.50 - 4.00 E9/L    Monocytes Absolute 0.91 0.10 - 0.95 E9/L    Eosinophils Absolute 0.10 0.05 - 0.50 E9/L    Basophils Absolute 0.07 0.00 - 0.20 E9/L   Comprehensive Metabolic Panel w/ Reflex to MG   Result Value Ref Range    Sodium 133 132 - 146 mmol/L    Potassium reflex Magnesium 4.4 3.5 - 5.0 mmol/L    Chloride 92 (L) 98 - 107 mmol/L    CO2 25 22 - 29 mmol/L    Anion Gap 16 7 - 16 mmol/L    Glucose 104 (H) 74 - 99 mg/dL    BUN 11 8 - 23 mg/dL    CREATININE 0.7 0.7 - 1.2 mg/dL    GFR Non-African American >60 >=60 mL/min/1.73    GFR African American >60     Calcium 10.1 8.6 - 10.2 mg/dL    Total Protein 8.1 6.4 - 8.3 g/dL    Alb 4.6 3.5 - 5.2 g/dL    Total Bilirubin 0.5 0.0 - 1.2 mg/dL    Alkaline Phosphatase 105 40 - 129 U/L    ALT 18 0 - 40 U/L    AST 26 0 - 39 U/L   Lipase   Result Value Ref Range    Lipase 13 13 - 60 U/L   Troponin   Result Value Ref Range    Troponin <0.01 0.00 - 0.03 ng/mL   Lactic Acid, Plasma   Result Value Ref Range    Lactic Acid 1.6 0.5 - 2.2 mmol/L   Urinalysis, reflex to microscopic   Result Value Ref Range    Color, UA Yellow Straw/Yellow    Clarity, UA Clear Clear    Glucose, Ur Negative Negative mg/dL    Bilirubin Urine Negative Negative    Ketones, Urine Negative Negative mg/dL    Specific Gravity, UA 1.015 1.005 - 1.030    Blood, Urine TRACE-INTACT Negative    pH, UA 6.0 5.0 - 9.0    Protein, UA Negative Negative mg/dL    Urobilinogen, Urine 0.2 <2.0 E.U./dL    Nitrite, Urine Negative Negative    Leukocyte Esterase, Urine Negative Negative   Microscopic Urinalysis Result Value Ref Range    WBC, UA NONE 0 - 5 /HPF    RBC, UA NONE 0 - 2 /HPF    Bacteria, UA NONE SEEN None Seen /HPF   EKG 12 Lead   Result Value Ref Range    Ventricular Rate 88 BPM    Atrial Rate 88 BPM    P-R Interval 152 ms    QRS Duration 86 ms    Q-T Interval 370 ms    QTc Calculation (Bazett) 447 ms    P Axis 27 degrees    R Axis -27 degrees    T Axis 34 degrees       RADIOLOGY:  Interpreted by Radiologist.  CT ABDOMEN PELVIS W IV CONTRAST Additional Contrast? None   Final Result         1. Mild mural thickening with subtle fat stranding in the proximal   duodenum. It may be infectious/inflammatory or related to peptic ulcer   disease. 2. Enlarged prostate. 3. Prominent calcified atherosclerosis in the abdominal aorta and   pelvic arteries. No evidence of aneurysm. CTA PULMONARY W CONTRAST   Final Result      1. No pulmonary embolism. 2. Clear lungs. 3. Dilation of the ascending aorta to 4.0 cm.   4. Multiple bilateral perifissural nodules, likely representing an   intrapulmonary lymph nodes. ------------------------- NURSING NOTES AND VITALS REVIEWED ---------------------------   The nursing notes within the ED encounter and vital signs as below have been reviewed. BP (!) 159/91   Pulse 101   Temp 98.6 °F (37 °C)   Resp 16   Ht 5' 9\" (1.753 m)   Wt 250 lb (113.4 kg)   SpO2 95%   BMI 36.92 kg/m²   Oxygen Saturation Interpretation: Normal      ---------------------------------------------------PHYSICAL EXAM--------------------------------------      Constitutional/General: Alert and oriented x3, well appearing, non toxic in NAD  Head: NC/AT  Eyes: PERRL, EOMI  Mouth: Oropharynx clear, handling secretions, no trismus  Neck: Supple, full ROM, no meningeal signs  Pulmonary: Lungs clear to auscultation bilaterally, no wheezes, rales, or rhonchi. Not in respiratory distress  Cardiovascular:  Regular rate and rhythm, no murmurs, gallops, or rubs.  2+ distal pulses  Abdomen: Soft, epigastric tenderness with voluntary guarding, non distended,   Extremities: Moves all extremities x 4. Warm and well perfused. No Homans or cords  Skin: warm and dry without rash  Neurologic: GCS 15,  Psych: Normal Affect      ------------------------------ ED COURSE/MEDICAL DECISION MAKING----------------------  Medications   pantoprazole (PROTONIX) injection 40 mg (40 mg Intravenous Given 8/4/20 1420)   morphine sulfate (PF) injection 4 mg (4 mg Intravenous Given 8/4/20 1420)   iopamidol (ISOVUE-370) 76 % injection 110 mL (110 mLs Intravenous Given 8/4/20 1458)         Medical Decision Making:    Patient was medicated with morphine and Protonix and improved. Reexamination of his abdomen 1545 reveals no tenderness. Patient is feeling better. Patient was discharged with a prescription for Carafate and Protonix. Patient was referred to follow-up with Dr. Liya Delgado. He was told to return if he has increased pain fever vomiting. Counseling: The emergency provider has spoken with the patient and discussed todays results, in addition to providing specific details for the plan of care and counseling regarding the diagnosis and prognosis. Questions are answered at this time and they are agreeable with the plan.      --------------------------------- IMPRESSION AND DISPOSITION ---------------------------------    IMPRESSION  1. Epigastric pain    2.  Peptic ulcer disease        DISPOSITION  Disposition: Discharge to home  Patient condition is stable                  Margy Hernandez MD  08/04/20 [id number]

## 2020-08-05 LAB
EKG ATRIAL RATE: 88 BPM
EKG P AXIS: 27 DEGREES
EKG P-R INTERVAL: 152 MS
EKG Q-T INTERVAL: 370 MS
EKG QRS DURATION: 86 MS
EKG QTC CALCULATION (BAZETT): 447 MS
EKG R AXIS: -27 DEGREES
EKG T AXIS: 34 DEGREES
EKG VENTRICULAR RATE: 88 BPM

## 2020-08-05 PROCEDURE — 93010 ELECTROCARDIOGRAM REPORT: CPT | Performed by: INTERNAL MEDICINE

## 2020-08-17 ENCOUNTER — HOSPITAL ENCOUNTER (OUTPATIENT)
Age: 70
Discharge: HOME | End: 2020-08-19

## 2020-08-17 PROCEDURE — 88342 IMHCHEM/IMCYTCHM 1ST ANTB: CPT

## 2020-08-17 PROCEDURE — 88305 TISSUE EXAM BY PATHOLOGIST: CPT

## 2020-10-24 ENCOUNTER — HOSPITAL ENCOUNTER (EMERGENCY)
Age: 70
Discharge: HOME | End: 2020-10-24
Payer: MEDICARE

## 2020-10-24 VITALS
OXYGEN SATURATION: 97 % | HEIGHT: 69 IN | DIASTOLIC BLOOD PRESSURE: 60 MMHG | HEART RATE: 72 BPM | RESPIRATION RATE: 16 BRPM | SYSTOLIC BLOOD PRESSURE: 90 MMHG | TEMPERATURE: 96.2 F | WEIGHT: 250 LBS | BODY MASS INDEX: 37.03 KG/M2

## 2020-10-24 PROCEDURE — 99283 EMERGENCY DEPT VISIT LOW MDM: CPT

## 2020-10-24 PROCEDURE — 6370000000 HC RX 637 (ALT 250 FOR IP): Performed by: NURSE PRACTITIONER

## 2020-10-24 PROCEDURE — 96372 THER/PROPH/DIAG INJ SC/IM: CPT

## 2020-10-24 PROCEDURE — 6360000002 HC RX W HCPCS: Performed by: NURSE PRACTITIONER

## 2020-10-24 RX ORDER — OXYCODONE HYDROCHLORIDE AND ACETAMINOPHEN 5; 325 MG/1; MG/1
1 TABLET ORAL EVERY 8 HOURS PRN
Qty: 9 TABLET | Refills: 0 | Status: SHIPPED | OUTPATIENT
Start: 2020-10-24 | End: 2020-10-27

## 2020-10-24 RX ORDER — OXYCODONE HYDROCHLORIDE AND ACETAMINOPHEN 5; 325 MG/1; MG/1
1 TABLET ORAL ONCE
Status: COMPLETED | OUTPATIENT
Start: 2020-10-24 | End: 2020-10-24

## 2020-10-24 RX ORDER — KETOROLAC TROMETHAMINE 30 MG/ML
30 INJECTION, SOLUTION INTRAMUSCULAR; INTRAVENOUS ONCE
Status: COMPLETED | OUTPATIENT
Start: 2020-10-24 | End: 2020-10-24

## 2020-10-24 RX ADMIN — KETOROLAC TROMETHAMINE 30 MG: 30 INJECTION, SOLUTION INTRAMUSCULAR; INTRAVENOUS at 17:15

## 2020-10-24 RX ADMIN — OXYCODONE AND ACETAMINOPHEN 1 TABLET: 5; 325 TABLET ORAL at 17:14

## 2020-10-24 ASSESSMENT — PAIN SCALES - GENERAL: PAINLEVEL_OUTOF10: 10

## 2020-10-24 ASSESSMENT — PAIN DESCRIPTION - PAIN TYPE: TYPE: CHRONIC PAIN

## 2020-10-24 ASSESSMENT — PAIN DESCRIPTION - ONSET: ONSET: ON-GOING

## 2020-10-24 ASSESSMENT — PAIN DESCRIPTION - LOCATION: LOCATION: BACK

## 2020-10-24 ASSESSMENT — PAIN DESCRIPTION - DESCRIPTORS: DESCRIPTORS: SHARP;SHOOTING

## 2020-10-24 ASSESSMENT — PAIN DESCRIPTION - ORIENTATION: ORIENTATION: LOWER

## 2020-10-24 ASSESSMENT — PAIN DESCRIPTION - PROGRESSION: CLINICAL_PROGRESSION: NOT CHANGED

## 2020-10-24 ASSESSMENT — PAIN DESCRIPTION - FREQUENCY: FREQUENCY: CONTINUOUS

## 2020-10-24 NOTE — ED PROVIDER NOTES
[facility]     Department of Emergency Medicine   ED  Provider Note  Admit Date/RoomTime: 10/24/2020  4:48 PM  ED Room: 10/10  Chief Complaint   Back Pain (lower back, going down right leg. Hx of multiple back surgeries. )    History of Present Illness   Source of history provided by:  patient. History/Exam Limitations: none. Julio César Boyd is a 78 y.o. old male with a prior history of recurrent self limited episodes of low back pain in the past, previous osteoarthritis of lumbar spine, previous herniated disc and previous spinal surgery , presents to the emergency department by private vehicle, for acute-on-chronic, aching and cramping bilateral lower lumbar spine pain with radiation, to the right thigh, for several day(s) prior to arrival.  The original pain was caused by remote injury. There has been no history of recent injury. She states that this episode of his acute on chronic back pain was due to him sitting on the roof trying to clean out his gutters. Patient denies any new direct injury or trauma. Since onset the symptoms have been intermittent and mild in severity. He denies any of the following: bladder incontinence, bladder urgency, bowel incontinence, bowel urgency or sacral numbness. Associated Signs & Symptoms: none. The pain is aggraveated by movement and relieved by nothing. There has been no abdominal pain, cramping, vomiting, diarrhea, fever, chills, sweats, headache, arthralgias, myalgias, irritability, URI symptoms or cough,chest pain,shortness of breath. He is not enrolled in pain management program.  Patient states that he did see Dr. Cornelia Bob but has since been released. Patient states that he has a history of 8 back surgeries and at this time has a stimulator in his lumbar spine. .  ROS    Pertinent positives and negatives are stated within HPI, all other systems reviewed and are negative.     Past Surgical History:  has a past surgical history that includes back surgery; knee surgery; other surgical history (5/8/2014); Carpal tunnel release; and hip surgery (Right). Social History:  reports that he quit smoking about 6 years ago. He has a 20.00 pack-year smoking history. He has never used smokeless tobacco. He reports that he does not drink alcohol or use drugs. Family History: family history is not on file. Allergies: Vicodin [hydrocodone-acetaminophen]    Physical Exam           ED Triage Vitals   BP Temp Temp Source Pulse Resp SpO2 Height Weight   10/24/20 1651 10/24/20 1651 10/24/20 1651 10/24/20 1651 10/24/20 1651 10/24/20 1651 10/24/20 1653 10/24/20 1653   90/60 96.2 °F (35.7 °C) Temporal 72 16 97 % 5' 9\" (1.753 m) 250 lb (113.4 kg)      Oxygen Saturation Interpretation: Normal.    Constitutional:  Alert, development consistent with age. HEENT:  NC/NT. Airway patent. Neck:  Normal ROM. Supple. Respiratory:  Clear to auscultation and breath sounds equal.  CV:  Regular rate and rhythm, normal heart sounds, without pathological murmurs, ectopy, gallops, or rubs. GI:  Abdomen Soft, nontender, good bowel sounds. No firm or pulsatile mass. Back: lower lumbar spine bilateral.             Tenderness: Mild. Swelling: no.              Range of Motion: full range with pain. CVA Tenderness: No.            Straight leg raising:  Bilateral positive at 30 degrees. Skin:  no erythema, rash or swelling noted. Distal Function:              Motor deficit: none. Sensory deficit: none. Pulse deficit: none. Calf Tenderness:  No Bilateral.               Edema:  none Both lower extremity(s). Reflexes: Bilateral knee,ankle,biceps normal.  Gait:  normal.  Integument:  Normal turgor. Warm, dry, without visible rash. Lymphatics: No lymphangitis or adenopathy noted. Neurological:  Oriented. Motor functions intact.     Lab / Imaging Results   (All laboratory and radiology results have been personally reviewed by myself)  Labs:  No results found for this visit on 10/24/20. Imaging: All Radiology results interpreted by Radiologist unless otherwise noted. No orders to display       ED Course / Medical Decision Making     Medications   ketorolac (TORADOL) injection 30 mg (30 mg Intramuscular Given 10/24/20 1715)   oxyCODONE-acetaminophen (PERCOCET) 5-325 MG per tablet 1 tablet (1 tablet Oral Given 10/24/20 1714)        Re-examination:  10/24/20       Time: 1730   Symptoms have improved with treatment. Consult(s):   None    Procedure(s):   none    Medical Decision Making/Counseling:    *At this time the patient is without objective evidence of an acute process requiring inpatient management. The emergency provider has spoken with the patient and discussed todays emergency visit, in addition to providing specific details for the plan of care and counseling regarding the diagnosis and prognosis. He was counseled on the role of the emergency department regarding prescribing medications for chronic conditions including Narcotic and other controlled substances. Based on the presenting complaint and nature of illness, the requested medications will be provided today in prescription form and he is instructed to contact their provider for supplemental medications as soon as possible. Questions are answered at this time and they are agreeable with the plan. patient states that his blood pressures normally runs in the 51'M systolic he states that he is on ,multiple antihypertensive medications and his dr likes to keep his blood pressure in the 90s. He denies any dizziness lightheaded he denies any chest pain shortness of breath abdominal pain or nausea. Stable for outpatient follow-up. Assessment      1.  Acute exacerbation of chronic low back pain      Plan   Discharge to home  Patient condition is good    New Medications     Discharge Medication List as of 10/24/2020  5:13 PM      START taking these medications    Details   oxyCODONE-acetaminophen (PERCOCET) 5-325 MG per tablet Take 1 tablet by mouth every 8 hours as needed for Pain for up to 3 days. Intended supply: 3 days. Take lowest dose possible to manage pain, Disp-9 tablet,R-0Print           Electronically signed by SHIRA Marinelli CNP   DD: 10/24/20  **This report was transcribed using voice recognition software. Every effort was made to ensure accuracy; however, inadvertent computerized transcription errors may be present.   END OF ED PROVIDER NOTE        SHIRA Marinelli CNP  10/24/20 [facility] [place] SHIRA Walker CNP  10/24/20 [id number]

## 2020-11-05 ENCOUNTER — HOSPITAL ENCOUNTER (EMERGENCY)
Age: 70
Discharge: HOME | End: 2020-11-05
Payer: MEDICARE

## 2020-11-05 VITALS
HEIGHT: 70 IN | OXYGEN SATURATION: 94 % | BODY MASS INDEX: 35.79 KG/M2 | SYSTOLIC BLOOD PRESSURE: 97 MMHG | HEART RATE: 69 BPM | TEMPERATURE: 97.3 F | DIASTOLIC BLOOD PRESSURE: 65 MMHG | WEIGHT: 250 LBS | RESPIRATION RATE: 16 BRPM

## 2020-11-05 PROCEDURE — 99283 EMERGENCY DEPT VISIT LOW MDM: CPT

## 2020-11-05 PROCEDURE — 6370000000 HC RX 637 (ALT 250 FOR IP): Performed by: NURSE PRACTITIONER

## 2020-11-05 RX ORDER — CYCLOBENZAPRINE HCL 5 MG
5 TABLET ORAL 2 TIMES DAILY PRN
Qty: 10 TABLET | Refills: 0 | Status: SHIPPED | OUTPATIENT
Start: 2020-11-05 | End: 2020-11-15

## 2020-11-05 RX ORDER — OXYCODONE HYDROCHLORIDE AND ACETAMINOPHEN 5; 325 MG/1; MG/1
2 TABLET ORAL ONCE
Status: COMPLETED | OUTPATIENT
Start: 2020-11-05 | End: 2020-11-05

## 2020-11-05 RX ORDER — LIDOCAINE 50 MG/G
1 PATCH TOPICAL DAILY
Qty: 10 PATCH | Refills: 0 | Status: SHIPPED | OUTPATIENT
Start: 2020-11-05 | End: 2020-11-15

## 2020-11-05 RX ADMIN — OXYCODONE AND ACETAMINOPHEN 2 TABLET: 5; 325 TABLET ORAL at 21:10

## 2020-11-05 ASSESSMENT — PAIN DESCRIPTION - PROGRESSION
CLINICAL_PROGRESSION: NOT CHANGED
CLINICAL_PROGRESSION: GRADUALLY IMPROVING

## 2020-11-05 ASSESSMENT — PAIN DESCRIPTION - FREQUENCY: FREQUENCY: CONTINUOUS

## 2020-11-05 ASSESSMENT — PAIN DESCRIPTION - ORIENTATION: ORIENTATION: LOWER;MID;LEFT;RIGHT

## 2020-11-05 ASSESSMENT — PAIN DESCRIPTION - LOCATION
LOCATION: BACK
LOCATION: BACK

## 2020-11-05 ASSESSMENT — PAIN DESCRIPTION - PAIN TYPE
TYPE: ACUTE PAIN
TYPE: ACUTE PAIN

## 2020-11-05 ASSESSMENT — PAIN SCALES - GENERAL
PAINLEVEL_OUTOF10: 9
PAINLEVEL_OUTOF10: 5

## 2020-11-05 ASSESSMENT — PAIN DESCRIPTION - DESCRIPTORS: DESCRIPTORS: ACHING;DISCOMFORT;SORE

## 2020-11-05 ASSESSMENT — PAIN DESCRIPTION - ONSET: ONSET: GRADUAL

## 2020-11-06 NOTE — ED PROVIDER NOTES
[facility]     Department of Emergency Medicine   ED  Provider Note  Admit Date/RoomTime: 11/5/2020  8:38 PM  ED Room: 05/05  Chief Complaint   Back Pain (bilateral not suppose to take anti-inflamatory due to ulcer, back surgery T-12 L-4 , S1, S2 , S3 screws and rods )    History of Present Illness   Source of history provided by:  patient. History/Exam Limitations: none. Jaleel Russell is a 78 y.o. old male with a prior history of recurrent self limited episodes of low back pain in the past, previous osteoarthritis of lumbar spine, previous herniated disc and previous spinal surgery -Dr. Siddharth Zamudio and insertion of a spinal stimulator, presents to the emergency department by private vehicle, for acute-on-chronic, aching bilateral lower lumbar spine pain without radiation, for several day(s) prior to arrival.  The original pain was caused by states that he was working outside on his house. There has been no history of recent injury. Since onset the symptoms have been intermittent and mild in severity. He denies any of the following: bladder incontinence, bladder urgency, bowel incontinence, bowel urgency or sacral numbness. Associated Signs & Symptoms: none. The pain is aggraveated by movement and relieved by nothing. There has been no abdominal pain, cramping, vomiting, diarrhea, fever, chills, sweats, headache, arthralgias, myalgias, irritability, URI symptoms or cough. He is not enrolled in pain management program.    .  ROS    Pertinent positives and negatives are stated within HPI, all other systems reviewed and are negative. Past Surgical History:  has a past surgical history that includes back surgery; knee surgery; other surgical history (5/8/2014); Carpal tunnel release; and hip surgery (Right). Social History:  reports that he quit smoking about 6 years ago. He has a 20.00 pack-year smoking history.  He has never used smokeless tobacco. He reports that he does not drink alcohol or use drugs. Family History: family history is not on file. Allergies: Vicodin [hydrocodone-acetaminophen]    Physical Exam           ED Triage Vitals   BP Temp Temp Source Pulse Resp SpO2 Height Weight   11/05/20 2041 11/05/20 2041 11/05/20 2041 11/05/20 2041 11/05/20 2041 11/05/20 2041 11/05/20 2046 11/05/20 2046   97/65 97.3 °F (36.3 °C) Temporal 69 16 94 % 5' 10\" (1.778 m) 250 lb (113.4 kg)      Oxygen Saturation Interpretation: Normal.    Constitutional:  Alert, development consistent with age. HEENT:  NC/NT. Airway patent. Neck:  Normal ROM. Supple. Respiratory:  Clear to auscultation and breath sounds equal.  CV:  Regular rate and rhythm, normal heart sounds, without pathological murmurs, ectopy, gallops, or rubs. GI:  Abdomen Soft, nontender, good bowel sounds. No firm or pulsatile mass. Back: lower lumbar spine bilateral.             Tenderness: Mild. Swelling: no.              Range of Motion: full range with pain. CVA Tenderness: No.            Straight leg raising:  Bilateral negative. Skin:  no erythema, rash or swelling noted. Distal Function:              Motor deficit: none. Sensory deficit: none. Pulse deficit: none. Calf Tenderness:  No Bilateral.               Edema:  none Both lower extremity(s). Reflexes: Bilateral knee,ankle,biceps normal.  Gait:  normal.  Integument:  Normal turgor. Warm, dry, without visible rash. Lymphatics: No lymphangitis or adenopathy noted. Neurological:  Oriented. Motor functions intact. Lab / Imaging Results   (All laboratory and radiology results have been personally reviewed by myself)  Labs:  No results found for this visit on 11/05/20. Imaging: All Radiology results interpreted by Radiologist unless otherwise noted.   No orders to display       ED Course / Medical Decision Making     Medications   oxyCODONE-acetaminophen (PERCOCET) 5-325 MG per tablet 2 tablet (2 tablets Oral Given 11/5/20 2110)            Consult(s):   None    Procedure(s):   none    Medical Decision Making/Counseling:    *At this time the patient is without objective evidence of an acute process requiring inpatient management. The emergency provider has spoken with the patient and discussed todays emergency visit, in addition to providing specific details for the plan of care and counseling regarding the diagnosis and prognosis. He was counseled on the role of the emergency department regarding prescribing medications for chronic conditions including Narcotic and other controlled substances. Based on the presenting complaint and nature of illness, the requested medications will not be provided today in prescription form and he is instructed to contact their provider for supplemental medications as soon as possible. Questions are answered at this time and they are agreeable with the plan. Assessment      1. Acute exacerbation of chronic low back pain      Plan   Discharge to home  Patient condition is good    New Medications     New Prescriptions    CYCLOBENZAPRINE (FLEXERIL) 5 MG TABLET    Take 1 tablet by mouth 2 times daily as needed for Muscle spasms    LIDOCAINE (LIDODERM) 5 %    Place 1 patch onto the skin daily for 10 days 12 hours on, 12 hours off. Electronically signed by SHIRA Garcia CNP   DD: 11/5/20  **This report was transcribed using voice recognition software. Every effort was made to ensure accuracy; however, inadvertent computerized transcription errors may be present.   END OF ED PROVIDER NOTE      SHIRA Garcia CNP  11/05/20 1837

## 2020-11-24 ENCOUNTER — HOSPITAL ENCOUNTER (OUTPATIENT)
Age: 70
Discharge: HOME | End: 2020-11-26

## 2020-11-24 PROCEDURE — 88342 IMHCHEM/IMCYTCHM 1ST ANTB: CPT

## 2020-11-24 PROCEDURE — 88305 TISSUE EXAM BY PATHOLOGIST: CPT

## 2021-01-09 ENCOUNTER — HOSPITAL ENCOUNTER (EMERGENCY)
Age: 71
Discharge: HOME | End: 2021-01-09
Attending: EMERGENCY MEDICINE
Payer: MEDICARE

## 2021-01-09 VITALS
WEIGHT: 250 LBS | DIASTOLIC BLOOD PRESSURE: 60 MMHG | HEART RATE: 64 BPM | OXYGEN SATURATION: 96 % | HEIGHT: 69 IN | RESPIRATION RATE: 16 BRPM | BODY MASS INDEX: 37.03 KG/M2 | TEMPERATURE: 96.9 F | SYSTOLIC BLOOD PRESSURE: 100 MMHG

## 2021-01-09 DIAGNOSIS — G89.29 ACUTE EXACERBATION OF CHRONIC LOW BACK PAIN: Primary | ICD-10-CM

## 2021-01-09 DIAGNOSIS — M54.30 SCIATICA, UNSPECIFIED LATERALITY: ICD-10-CM

## 2021-01-09 DIAGNOSIS — M54.50 ACUTE EXACERBATION OF CHRONIC LOW BACK PAIN: Primary | ICD-10-CM

## 2021-01-09 PROCEDURE — 99284 EMERGENCY DEPT VISIT MOD MDM: CPT

## 2021-01-09 PROCEDURE — 96372 THER/PROPH/DIAG INJ SC/IM: CPT

## 2021-01-09 PROCEDURE — 6360000002 HC RX W HCPCS: Performed by: EMERGENCY MEDICINE

## 2021-01-09 PROCEDURE — 6370000000 HC RX 637 (ALT 250 FOR IP): Performed by: EMERGENCY MEDICINE

## 2021-01-09 RX ORDER — CYCLOBENZAPRINE HCL 10 MG
10 TABLET ORAL ONCE
Status: COMPLETED | OUTPATIENT
Start: 2021-01-09 | End: 2021-01-09

## 2021-01-09 RX ORDER — CYCLOBENZAPRINE HCL 10 MG
10 TABLET ORAL 3 TIMES DAILY PRN
Qty: 20 TABLET | Refills: 0 | Status: SHIPPED | OUTPATIENT
Start: 2021-01-09 | End: 2021-01-19

## 2021-01-09 RX ORDER — ONDANSETRON 4 MG/1
8 TABLET, ORALLY DISINTEGRATING ORAL ONCE
Status: COMPLETED | OUTPATIENT
Start: 2021-01-09 | End: 2021-01-09

## 2021-01-09 RX ORDER — OXYCODONE HYDROCHLORIDE AND ACETAMINOPHEN 5; 325 MG/1; MG/1
1-2 TABLET ORAL EVERY 6 HOURS PRN
Qty: 12 TABLET | Refills: 0 | Status: SHIPPED | OUTPATIENT
Start: 2021-01-09 | End: 2021-01-14

## 2021-01-09 RX ADMIN — HYDROMORPHONE HYDROCHLORIDE 1 MG: 1 INJECTION, SOLUTION INTRAMUSCULAR; INTRAVENOUS; SUBCUTANEOUS at 22:22

## 2021-01-09 RX ADMIN — CYCLOBENZAPRINE 10 MG: 10 TABLET, FILM COATED ORAL at 22:26

## 2021-01-09 RX ADMIN — ONDANSETRON 8 MG: 4 TABLET, ORALLY DISINTEGRATING ORAL at 22:22

## 2021-01-09 ASSESSMENT — PAIN SCALES - GENERAL
PAINLEVEL_OUTOF10: 10
PAINLEVEL_OUTOF10: 6

## 2021-01-09 ASSESSMENT — PAIN DESCRIPTION - PAIN TYPE: TYPE: ACUTE PAIN

## 2021-01-09 ASSESSMENT — PAIN DESCRIPTION - DIRECTION: RADIATING_TOWARDS: BILATERAL LEGS

## 2021-01-09 ASSESSMENT — PAIN DESCRIPTION - DESCRIPTORS: DESCRIPTORS: ACHING;DULL;SHARP

## 2021-01-10 NOTE — ED PROVIDER NOTES
HPI:  1/9/21, Time: 10:09 PM BRANDY Carranza is a 78 y.o. male presenting to the ED for lower back pain beginning 8-10 hours ago. The complaint has been persistent, severe in severity, and worsened by changing position, movement of trunk. Patient has history of chronic low back pain has had multiple back surgeries and is followed by orthopedic spine surgeon. The patient has not had any preceding trauma or falls. No fecal or urinary incontinence, no saddle anesthesia, no change in bowel bladder patterns reported. Patient rates his pain a 10/10 severity. He states it does radiate to his leg. Review of Systems:   A complete review of systems was performed and pertinent positives and negatives are stated within HPI, all other systems reviewed and are negative.      --------------------------------------------- PAST HISTORY ---------------------------------------------  Past Medical History:  has a past medical history of Anxiety, Diabetes mellitus ([facility] [address] Hyperlipidemia, and Hypertension. Past Surgical History:  has a past surgical history that includes back surgery; knee surgery; other surgical history (5/8/2014); Carpal tunnel release; and hip surgery (Right). Social History:  reports that he quit smoking about 7 years ago. He has a 20.00 pack-year smoking history. He has never used smokeless tobacco. He reports that he does not drink alcohol or use drugs. Family History: family history is not on file. The patients home medications have been reviewed. Allergies: Vicodin [hydrocodone-acetaminophen]    -------------------------------------------------- RESULTS -------------------------------------------------  All laboratory and radiology results have been personally reviewed by myself   LABS:  No results found for this visit on 01/09/21.     RADIOLOGY:  Interpreted by Radiologist.  No orders to display ------------------------- NURSING NOTES AND VITALS REVIEWED ---------------------------  The nursing notes within the ED encounter and vital signs as below have been reviewed. /60   Pulse 76   Temp 96.9 °F (36.1 °C) (Temporal)   Resp 16   Ht 5' 9\" (1.753 m)   Wt 250 lb (113.4 kg)   SpO2 96%   BMI 36.92 kg/m²   Oxygen Saturation Interpretation: Normal      ---------------------------------------------------PHYSICAL EXAM--------------------------------------      Constitutional/General: Alert and oriented x3, well appearing, non toxic in moderate distress  Head: Normocephalic and atraumatic  Eyes: PERRL, EOMI  Mouth: Oropharynx clear, handling secretions, no trismus  Neck: Supple, full ROM,   Pulmonary: Lungs clear to auscultation bilaterally, no wheezes, rales, or rhonchi. Not in respiratory distress  Cardiovascular:  Regular rate and rhythm, no murmurs, gallops, or rubs. 2+ distal pulses  Abdomen: Soft, non tender, non distended, no organomegaly no masses no guarding no rigidity normal bowel sounds  Extremities: Moves all extremities x 4. Warm and well perfused; no motor weakness. Good peripheral pulses  Skin: warm and dry without rash  Neurologic: GCS 15, cranial nerves II through XII intact no focal deficits. No meningeal signs.   2+ knee jerk reflexes bilaterally  Psych: Normal Affect      ------------------------------ ED COURSE/MEDICAL DECISION MAKING----------------------  Medications   HYDROmorphone (DILAUDID) injection 1 mg (1 mg Intramuscular Given 1/9/21 2222)   ondansetron (ZOFRAN-ODT) disintegrating tablet 8 mg (8 mg Oral Given 1/9/21 2222)   cyclobenzaprine (FLEXERIL) tablet 10 mg (10 mg Oral Given 1/9/21 2226)         ED COURSE:     Medical Decision Making: Cauda equina syndrome is not felt to be clinically likely given the patient's chronic history of lower back pain and previous episodes of sciatica. The patient has not had any fecal or urinary incontinence no any saddle anesthesia. Patient's not had any acute traumatic injury to warrant CT lumbar study. We'll have him follow-up with Dr. Cade Sen for reevaluation. Patient states his symptoms are improved after administration of meds as listed above. Counseling: The emergency provider has spoken with the patient and discussed todays results, in addition to providing specific details for the plan of care and counseling regarding the diagnosis and prognosis. Questions are answered at this time and they are agreeable with the plan. Controlled Substance Monitoring:  Acute and Chronic Pain Monitoring:   RX Monitoring 1/9/2021   Attestation -   Periodic Controlled Substance Monitoring No signs of potential drug abuse or diversion identified. Chronic Pain > 80 MEDD -         --------------------------------- IMPRESSION AND DISPOSITION ---------------------------------    IMPRESSION  1. Acute exacerbation of chronic low back pain    2. Sciatica, unspecified laterality        DISPOSITION  Disposition: Discharge to home  Patient condition is stable      NOTE: This report was transcribed using voice recognition software.  Every effort was made to ensure accuracy; however, inadvertent computerized transcription errors may be present        Ignacio Torres MD  01/09/21 [id number]       Ignacio Torres MD  01/09/21 7265

## 2021-01-10 NOTE — ED NOTES
Discharged  To follow with pmd (Dr Kayden Oconnell)-  rx x 2 given     Dalia Smith RN  01/09/21 3035

## 2021-02-02 ENCOUNTER — HOSPITAL ENCOUNTER (EMERGENCY)
Age: 71
Discharge: HOME | End: 2021-02-02
Attending: EMERGENCY MEDICINE
Payer: MEDICARE

## 2021-02-02 VITALS
RESPIRATION RATE: 16 BRPM | WEIGHT: 250 LBS | SYSTOLIC BLOOD PRESSURE: 142 MMHG | DIASTOLIC BLOOD PRESSURE: 82 MMHG | HEIGHT: 69 IN | BODY MASS INDEX: 37.03 KG/M2 | HEART RATE: 80 BPM | OXYGEN SATURATION: 98 % | TEMPERATURE: 98.4 F

## 2021-02-02 DIAGNOSIS — M54.16 LUMBAR RADICULOPATHY: Primary | ICD-10-CM

## 2021-02-02 PROCEDURE — 99283 EMERGENCY DEPT VISIT LOW MDM: CPT

## 2021-02-02 PROCEDURE — 6370000000 HC RX 637 (ALT 250 FOR IP): Performed by: EMERGENCY MEDICINE

## 2021-02-02 PROCEDURE — 6360000002 HC RX W HCPCS: Performed by: EMERGENCY MEDICINE

## 2021-02-02 PROCEDURE — 96372 THER/PROPH/DIAG INJ SC/IM: CPT

## 2021-02-02 RX ORDER — PREDNISONE 10 MG/1
40 TABLET ORAL DAILY
Qty: 20 TABLET | Refills: 0 | Status: SHIPPED | OUTPATIENT
Start: 2021-02-02 | End: 2021-02-07

## 2021-02-02 RX ORDER — OXYCODONE HYDROCHLORIDE AND ACETAMINOPHEN 5; 325 MG/1; MG/1
1 TABLET ORAL ONCE
Status: COMPLETED | OUTPATIENT
Start: 2021-02-02 | End: 2021-02-02

## 2021-02-02 RX ORDER — ORPHENADRINE CITRATE 30 MG/ML
60 INJECTION INTRAMUSCULAR; INTRAVENOUS ONCE
Status: COMPLETED | OUTPATIENT
Start: 2021-02-02 | End: 2021-02-02

## 2021-02-02 RX ORDER — METAXALONE 800 MG/1
800 TABLET ORAL 3 TIMES DAILY
Qty: 15 TABLET | Refills: 0 | Status: SHIPPED | OUTPATIENT
Start: 2021-02-02 | End: 2021-02-07

## 2021-02-02 RX ORDER — MORPHINE SULFATE 4 MG/ML
4 INJECTION, SOLUTION INTRAMUSCULAR; INTRAVENOUS ONCE
Status: COMPLETED | OUTPATIENT
Start: 2021-02-02 | End: 2021-02-02

## 2021-02-02 RX ORDER — OXYCODONE HYDROCHLORIDE AND ACETAMINOPHEN 5; 325 MG/1; MG/1
1 TABLET ORAL EVERY 6 HOURS PRN
Qty: 10 TABLET | Refills: 0 | Status: SHIPPED | OUTPATIENT
Start: 2021-02-02 | End: 2021-02-05

## 2021-02-02 RX ADMIN — MORPHINE SULFATE 4 MG: 4 INJECTION, SOLUTION INTRAMUSCULAR; INTRAVENOUS at 21:29

## 2021-02-02 RX ADMIN — ORPHENADRINE CITRATE 60 MG: 60 INJECTION INTRAMUSCULAR; INTRAVENOUS at 21:29

## 2021-02-02 RX ADMIN — OXYCODONE AND ACETAMINOPHEN 1 TABLET: 5; 325 TABLET ORAL at 22:42

## 2021-02-02 ASSESSMENT — PAIN SCALES - GENERAL
PAINLEVEL_OUTOF10: 10
PAINLEVEL_OUTOF10: 6

## 2021-02-02 ASSESSMENT — PAIN - FUNCTIONAL ASSESSMENT: PAIN_FUNCTIONAL_ASSESSMENT: PREVENTS OR INTERFERES SOME ACTIVE ACTIVITIES AND ADLS

## 2021-02-02 ASSESSMENT — PAIN DESCRIPTION - PAIN TYPE: TYPE: CHRONIC PAIN

## 2021-02-02 ASSESSMENT — PAIN DESCRIPTION - DESCRIPTORS: DESCRIPTORS: CONSTANT;THROBBING

## 2021-02-02 ASSESSMENT — PAIN DESCRIPTION - PROGRESSION: CLINICAL_PROGRESSION: NOT CHANGED

## 2021-02-02 ASSESSMENT — PAIN DESCRIPTION - ORIENTATION: ORIENTATION: LOWER

## 2021-02-02 ASSESSMENT — PAIN DESCRIPTION - ONSET: ONSET: ON-GOING

## 2021-02-02 ASSESSMENT — PAIN DESCRIPTION - FREQUENCY: FREQUENCY: CONTINUOUS

## 2021-02-03 NOTE — ED PROVIDER NOTES
HPI:  2/2/21,   Time: 9:15 PM BRANDY Kohler is a 78 y.o. male presenting to the ED for back pain radiating down both legs, beginning more than 1 day ago. The complaint has been persistent, severe in severity, and worsened by movement . Patient has a history of back problems and back surgery and states that he feels his \"L5 is inflamed\" again. No bowel or bladder problems. Patient has been taking Tylenol but states pain is severe. Patient states he has had multiple imaging studies on his lower back and has had multiple surgeries. The patient states there is an appointment to see a back specialist, Dr. Delma Kowalski in 2 days. ROS:   Pertinent positives and negatives are stated within HPI, all other systems reviewed and are negative.  --------------------------------------------- PAST HISTORY ---------------------------------------------  Past Medical History:  has a past medical history of Anxiety, Back pain, Diabetes mellitus ([facility] [address] Hyperlipidemia, and Hypertension. Past Surgical History:  has a past surgical history that includes back surgery; knee surgery; other surgical history (5/8/2014); Carpal tunnel release; and hip surgery (Right). Social History:  reports that he quit smoking about 7 years ago. He has a 20.00 pack-year smoking history. He has never used smokeless tobacco. He reports that he does not drink alcohol or use drugs. Family History: family history is not on file. The patients home medications have been reviewed. Allergies: Vicodin [hydrocodone-acetaminophen]    -------------------------------------------------- RESULTS -------------------------------------------------  All laboratory and radiology results have been personally reviewed by myself   LABS:  No results found for this visit on 02/02/21.     RADIOLOGY:  Interpreted by Radiologist.  No orders to display       ------------------------- NURSING NOTES AND VITALS REVIEWED ---------------------------   The nursing notes within the ED encounter and vital signs as below have been reviewed. BP (!) 170/98   Pulse 84   Temp 96.9 °F (36.1 °C) (Temporal)   Resp 18   Ht 5' 9\" (1.753 m)   Wt 250 lb (113.4 kg)   SpO2 97%   BMI 36.92 kg/m²   Oxygen Saturation Interpretation: Normal      ---------------------------------------------------PHYSICAL EXAM--------------------------------------      Constitutional/General: Alert and oriented x3, well appearing in a lot of pain but not in any distress or respiratory distress. Head: NC/AT  Eyes: PERRL, EOMI  Mouth: Oropharynx clear, handling secretions, no trismus  Neck: Supple, full ROM, no meningeal signs  Pulmonary: Lungs clear to auscultation bilaterally, no wheezes, rales, or rhonchi. Not in respiratory distress  Cardiovascular:  Regular rate and rhythm, no murmurs, gallops, or rubs. 2+ distal pulses  Abdomen: Soft, non tender, non distended,   Extremities: Moves all extremities x 4. Warm and well perfused  Skin: warm and dry without rash  Neurologic: GCS 15, moving all extremities no focal neurological deficits. Good strength upper and lower extremities. He does have pain on straight leg raising bilaterally and has decreased knee reflexes. He is able to wiggle his toes and move his feet. Surgical scars, old are noted on the lumbar spine  Psych: Normal Affect      ------------------------------ ED COURSE/MEDICAL DECISION MAKING----------------------  Medications   oxyCODONE-acetaminophen (PERCOCET) 5-325 MG per tablet 1 tablet (has no administration in time range)   morphine sulfate (PF) injection 4 mg (4 mg Intramuscular Given 2/2/21 2129)   orphenadrine (NORFLEX) injection 60 mg (60 mg Intramuscular Given 2/2/21 2129)         Medical Decision Making: Will medicate the patient for pain and will give morphine and Norflex and will observe the patient  Patient was reexamined at approximately 10:30 PM and states he is doing better.   He will be discharged with pain medication, short course of steroids and a muscle relaxant  Counseling: The emergency provider has spoken with the patient and discussed todays results, in addition to providing specific details for the plan of care and counseling regarding the diagnosis and prognosis. Questions are answered at this time and they are agreeable with the plan.      --------------------------------- IMPRESSION AND DISPOSITION ---------------------------------    IMPRESSION  1.  Lumbar radiculopathy        DISPOSITION  Disposition: Discharge to home  Patient condition is fair                  Shanti Padilla MD  02/10/21 9102

## 2021-02-14 ENCOUNTER — HOSPITAL ENCOUNTER (EMERGENCY)
Age: 71
Discharge: HOME | End: 2021-02-14
Attending: EMERGENCY MEDICINE
Payer: MEDICARE

## 2021-02-14 VITALS
OXYGEN SATURATION: 97 % | HEIGHT: 69 IN | BODY MASS INDEX: 37.03 KG/M2 | SYSTOLIC BLOOD PRESSURE: 146 MMHG | RESPIRATION RATE: 16 BRPM | DIASTOLIC BLOOD PRESSURE: 78 MMHG | TEMPERATURE: 97.4 F | HEART RATE: 80 BPM | WEIGHT: 250 LBS

## 2021-02-14 DIAGNOSIS — M54.50 ACUTE EXACERBATION OF CHRONIC LOW BACK PAIN: Primary | ICD-10-CM

## 2021-02-14 DIAGNOSIS — M54.30 SCIATICA, UNSPECIFIED LATERALITY: ICD-10-CM

## 2021-02-14 DIAGNOSIS — G89.29 ACUTE EXACERBATION OF CHRONIC LOW BACK PAIN: Primary | ICD-10-CM

## 2021-02-14 PROCEDURE — 6360000002 HC RX W HCPCS: Performed by: EMERGENCY MEDICINE

## 2021-02-14 PROCEDURE — 6370000000 HC RX 637 (ALT 250 FOR IP): Performed by: EMERGENCY MEDICINE

## 2021-02-14 PROCEDURE — 96372 THER/PROPH/DIAG INJ SC/IM: CPT

## 2021-02-14 PROCEDURE — 99283 EMERGENCY DEPT VISIT LOW MDM: CPT

## 2021-02-14 RX ORDER — OXYCODONE HYDROCHLORIDE AND ACETAMINOPHEN 5; 325 MG/1; MG/1
1 TABLET ORAL EVERY 6 HOURS PRN
Qty: 10 TABLET | Refills: 0 | Status: SHIPPED | OUTPATIENT
Start: 2021-02-14 | End: 2021-02-17

## 2021-02-14 RX ORDER — CYCLOBENZAPRINE HCL 10 MG
10 TABLET ORAL 3 TIMES DAILY PRN
Qty: 20 TABLET | Refills: 0 | Status: SHIPPED | OUTPATIENT
Start: 2021-02-14 | End: 2021-02-24

## 2021-02-14 RX ORDER — CYCLOBENZAPRINE HCL 10 MG
10 TABLET ORAL ONCE
Status: COMPLETED | OUTPATIENT
Start: 2021-02-14 | End: 2021-02-14

## 2021-02-14 RX ORDER — NAPROXEN 500 MG/1
500 TABLET ORAL 2 TIMES DAILY
Qty: 10 TABLET | Refills: 0 | Status: SHIPPED | OUTPATIENT
Start: 2021-02-14 | End: 2021-08-14

## 2021-02-14 RX ADMIN — HYDROMORPHONE HYDROCHLORIDE 1 MG: 1 INJECTION, SOLUTION INTRAMUSCULAR; INTRAVENOUS; SUBCUTANEOUS at 23:00

## 2021-02-14 RX ADMIN — CYCLOBENZAPRINE 10 MG: 10 TABLET, FILM COATED ORAL at 23:00

## 2021-02-14 ASSESSMENT — PAIN DESCRIPTION - DESCRIPTORS: DESCRIPTORS: DISCOMFORT

## 2021-02-14 ASSESSMENT — PAIN DESCRIPTION - LOCATION: LOCATION: BACK

## 2021-02-14 ASSESSMENT — PAIN SCALES - GENERAL
PAINLEVEL_OUTOF10: 10
PAINLEVEL_OUTOF10: 7

## 2021-02-15 NOTE — ED PROVIDER NOTES
HPI:  2/14/21, Time: 10:50 PM BRANDY Ayala is a 78 y.o. male presenting to the ED for lower back pain w/ radiation to both legs, beginning several days ago. The complaint has been persistent, severe in severity, and worsened by changing position, movement of trunk. Patient's not had any fecal or urine incontinence. He has known history of sciatica and has had radiation to both legs previously. He has not had any saddle anesthesia. No history of any recent trauma or falls. Patient does have a back surgeon whom he is seen recently who is recommended lumbar disc fusion. The patient has not had any fever/chills reported, no change in bowel or bladder habit patterns reported, no shortness of breath, no chest pains no any other complaints. He rates his back pain a 10/10 severity. No relieving factors. Review of Systems:   A complete review of systems was performed and pertinent positives and negatives are stated within HPI, all other systems reviewed and are negative.    --------------------------------------------- PAST HISTORY ---------------------------------------------  Past Medical History:  has a past medical history of Anxiety, Back pain, Diabetes mellitus ([address] Hyperlipidemia, and Hypertension. Past Surgical History:  has a past surgical history that includes back surgery; knee surgery; other surgical history (5/8/2014); Carpal tunnel release; and hip surgery (Right). Social History:  reports that he quit smoking about 7 years ago. He has a 20.00 pack-year smoking history. He has never used smokeless tobacco. He reports that he does not drink alcohol or use drugs. Family History: family history is not on file. The patients home medications have been reviewed.     Allergies: Vicodin [hydrocodone-acetaminophen]    -------------------------------------------------- RESULTS ------------------------------------------------- All laboratory and radiology results have been personally reviewed by myself   LABS:  No results found for this visit on 02/14/21. RADIOLOGY:  Interpreted by Radiologist.  No orders to display       ------------------------- NURSING NOTES AND VITALS REVIEWED ---------------------------    The nursing notes within the ED encounter and vital signs as below have been reviewed. BP (!) 146/78   Pulse 80   Temp 97.4 °F (36.3 °C) (Temporal)   Resp 16   Ht 5' 9\" (1.753 m)   Wt 250 lb (113.4 kg)   SpO2 97%   BMI 36.92 kg/m²   Oxygen Saturation Interpretation: Normal    ---------------------------------------------------PHYSICAL EXAM--------------------------------------    Constitutional/General: Alert and oriented x3, well appearing, non toxic in moderate distress  Head: Normocephalic and atraumatic  Eyes: PERRL, EOMI  Mouth: Oropharynx clear, handling secretions, no trismus  Neck: Supple, full ROM,   Pulmonary: Lungs clear to auscultation bilaterally, no wheezes, rales, or rhonchi. Not in respiratory distress  Cardiovascular:  Regular rate and rhythm, no murmurs, gallops, or rubs. 2+ distal pulses  Abdomen: Soft, non tender, non distended, no again megaly no mass no guarding rigidity normal bowel sounds  Extremities: Moves all extremities x 4. Warm and well perfused no clubbing sinus edema good peripheral pulses; able achieve 90 degrees of flexion of the waist in the bedside with legs dangling some palpable muscle tension in the lower lumbar areas old healing surgical scars lumbar area. Skin: warm and dry without rash  Neurologic: GCS 15, cranial nerves II through XII intact no focal deficits. Knee jerk reflex tested shows 2+ knee jerk reflexes bilaterally symmetric. No sensory deficits bilateral lower extremities.   Psych: Normal Affect      ------------------------------ ED COURSE/MEDICAL DECISION MAKING----------------------  Medications HYDROmorphone (DILAUDID) injection 1 mg (has no administration in time range)   cyclobenzaprine (FLEXERIL) tablet 10 mg (has no administration in time range)       ED COURSE:     Medical Decision Making:   Cauda equina syndrome is not felt to be clinically likely given the patient's chronic history of lower back pain and previous episodes of sciatica. The patient has not had any fecal or urinary incontinence no any saddle anesthesia. Patient's not had any acute traumatic injury to warrant CT lumbar study. We'll have him follow-up with Dr. Indu Warren for reevaluation. Patient states his symptoms are improved after administration of meds as listed above    Counseling: The emergency provider has spoken with the patient and discussed todays results, in addition to providing specific details for the plan of care and counseling regarding the diagnosis and prognosis. Questions are answered at this time and they are agreeable with the plan. Controlled Substance Monitoring:  Acute and Chronic Pain Monitoring:   RX Monitoring 2/14/2021   Attestation -   Acute Pain Prescriptions -   Periodic Controlled Substance Monitoring No signs of potential drug abuse or diversion identified. Chronic Pain > 80 MEDD -         --------------------------------- IMPRESSION AND DISPOSITION ---------------------------------    IMPRESSION  1. Acute exacerbation of chronic low back pain    2. Sciatica, unspecified laterality        DISPOSITION  Disposition: Discharge to home  Patient condition is stable      NOTE: This report was transcribed using voice recognition software.  Every effort was made to ensure accuracy; however, inadvertent computerized transcription errors may be present        Dhara Santos MD  02/14/21 [id number]

## 2021-02-15 NOTE — ED NOTES
Discharge instructions and prescriptions given. Patient states verbal understanding. Ambulatory to exit.        Radha Hodges RN  02/14/21 2930

## 2021-02-26 ENCOUNTER — HOSPITAL ENCOUNTER (EMERGENCY)
Age: 71
Discharge: HOME | End: 2021-02-26
Attending: STUDENT IN AN ORGANIZED HEALTH CARE EDUCATION/TRAINING PROGRAM
Payer: MEDICARE

## 2021-02-26 VITALS
TEMPERATURE: 97 F | HEART RATE: 96 BPM | RESPIRATION RATE: 16 BRPM | OXYGEN SATURATION: 98 % | SYSTOLIC BLOOD PRESSURE: 122 MMHG | DIASTOLIC BLOOD PRESSURE: 84 MMHG

## 2021-02-26 DIAGNOSIS — G89.29 ACUTE EXACERBATION OF CHRONIC LOW BACK PAIN: Primary | ICD-10-CM

## 2021-02-26 DIAGNOSIS — M54.50 ACUTE EXACERBATION OF CHRONIC LOW BACK PAIN: Primary | ICD-10-CM

## 2021-02-26 PROCEDURE — 6360000002 HC RX W HCPCS: Performed by: STUDENT IN AN ORGANIZED HEALTH CARE EDUCATION/TRAINING PROGRAM

## 2021-02-26 PROCEDURE — 99283 EMERGENCY DEPT VISIT LOW MDM: CPT

## 2021-02-26 PROCEDURE — 96372 THER/PROPH/DIAG INJ SC/IM: CPT

## 2021-02-26 RX ORDER — CYCLOBENZAPRINE HCL 5 MG
5 TABLET ORAL 2 TIMES DAILY PRN
Qty: 10 TABLET | Refills: 0 | Status: SHIPPED | OUTPATIENT
Start: 2021-02-26 | End: 2021-03-08

## 2021-02-26 RX ORDER — OXYCODONE AND ACETAMINOPHEN 10; 325 MG/1; MG/1
1 TABLET ORAL EVERY 4 HOURS PRN
COMMUNITY
End: 2021-05-25

## 2021-02-26 RX ORDER — LIDOCAINE 50 MG/G
1 PATCH TOPICAL DAILY
Qty: 10 PATCH | Refills: 0 | Status: SHIPPED | OUTPATIENT
Start: 2021-02-26 | End: 2021-03-08

## 2021-02-26 RX ORDER — ORPHENADRINE CITRATE 30 MG/ML
60 INJECTION INTRAMUSCULAR; INTRAVENOUS ONCE
Status: COMPLETED | OUTPATIENT
Start: 2021-02-26 | End: 2021-02-26

## 2021-02-26 RX ORDER — DEXAMETHASONE SODIUM PHOSPHATE 10 MG/ML
10 INJECTION, SOLUTION INTRAMUSCULAR; INTRAVENOUS ONCE
Status: COMPLETED | OUTPATIENT
Start: 2021-02-26 | End: 2021-02-26

## 2021-02-26 RX ORDER — KETOROLAC TROMETHAMINE 30 MG/ML
15 INJECTION, SOLUTION INTRAMUSCULAR; INTRAVENOUS ONCE
Status: COMPLETED | OUTPATIENT
Start: 2021-02-26 | End: 2021-02-26

## 2021-02-26 RX ADMIN — DEXAMETHASONE SODIUM PHOSPHATE 10 MG: 10 INJECTION, SOLUTION INTRAMUSCULAR; INTRAVENOUS at 22:26

## 2021-02-26 RX ADMIN — KETOROLAC TROMETHAMINE 15 MG: 30 INJECTION, SOLUTION INTRAMUSCULAR; INTRAVENOUS at 22:27

## 2021-02-26 RX ADMIN — ORPHENADRINE CITRATE 60 MG: 60 INJECTION INTRAMUSCULAR; INTRAVENOUS at 22:27

## 2021-02-26 ASSESSMENT — PAIN DESCRIPTION - ORIENTATION: ORIENTATION: RIGHT;LEFT

## 2021-02-27 NOTE — ED PROVIDER NOTES
Department of Emergency Medicine   ED  Provider Note  Admit Date/RoomTime: 2/26/2021  8:34 PM  ED Room: 11/11          History of Present Illness:  2/26/21, Time: 10:25 PM EST  Chief Complaint   Patient presents with    Back Pain     goes to pain clinic    Leg Pain     bilateral          Enma Yousif is a 78 y.o. male presenting to the ED for back pain, beginning this evening. The complaint has been constant, severe in severity, and worsened by changing position. He has a history of chronic back pain and sciatica with multiple ED visits for his back pain. He states that his last surgery was in December, and he had a stimulator placed in his back at that time. He was following up at a pain clinic, and he was taking Tramadol at home with minimal relief. The patient states that he was following up in a pain clinic, but is only receiving tramadol from them. He states that he has been getting oxycodone from multiple ER visits. He recently had oxycodone filled on February 15 for 10 pills. He states that he ran out yesterday as he was only taking 1 pill each night. He states that this was not helping with his pain. His pain is primarily in the lower back and radiates down both of his legs. He states it is worse in the right leg. The patient states that it is worse with movement or any change in position. Nothing makes it better. He states that he does follow-up with Dr. Susan Plunkett at [facility]. The patient denies any urinary incontinence, urinary retention, increased urinary frequency, dysuria, hematuria, fever, chills, saddle anesthesia, loss of bladder or bowel function, or other acute symptoms or concerns.           Review of Systems:   Pertinent positives and negatives are stated within HPI, all other systems reviewed and are negative.        --------------------------------------------- PAST HISTORY ---------------------------------------------  Past Medical History:  has a past medical history of Anxiety, Back pain, Diabetes mellitus ([facility] [address] Hyperlipidemia, and Hypertension. Past Surgical History:  has a past surgical history that includes back surgery; knee surgery; other surgical history (5/8/2014); Carpal tunnel release; and hip surgery (Right). Social History:  reports that he quit smoking about 7 years ago. He has a 20.00 pack-year smoking history. He has never used smokeless tobacco. He reports that he does not drink alcohol or use drugs. Family History: family history is not on file. . Unless otherwise noted, family history is non contributory    The patients home medications have been reviewed. Allergies: Vicodin [hydrocodone-acetaminophen]    I have reviewed the past medical history, past surgical history, social history, and family history    ---------------------------------------------------PHYSICAL EXAM--------------------------------------    Constitutional/General: Alert and oriented x3  Head: Normocephalic and atraumatic  Eyes:  EOMI, sclera non icteric  Mouth: Oropharynx clear, handling secretions, no trismus, no asymmetry of the posterior oropharynx or uvular edema  Neck: Supple, full ROM, no stridor, no meningeal signs  Respiratory: Lungs clear to auscultation bilaterally, no wheezes, rales, or rhonchi. Not in respiratory distress  Cardiovascular:  Regular rate. Regular rhythm. No murmurs, no aortic murmurs, no gallops, or rubs. Chest: No chest wall tenderness  Gastrointestinal:  Abdomen Soft, Non tender, Non distended. No rebound, guarding, or rigidity. No pulsatile masses. Musculoskeletal: Midline scar over the thoracic and upper lumbar spine. There is a stimulator present in the left lower paraspinal musculature. There is tenderness to palpation over the bilateral paraspinal musculature. There is no midline tenderness. No ecchymosis, no erythema, no evidence of trauma or overlying skin changes. Sensation is intact and equal of the bilateral lower extremities.   Full range of motion of the bilateral lower extremities. Gait is normal.  Moves all extremities x 4. Warm and well perfused, no clubbing, no cyanosis, no edema. Capillary refill <3 seconds  Skin: skin warm and dry. No rashes. Neurologic: GCS 15, no focal deficits, symmetric strength 5/5 in the upper and lower extremities bilaterally  Psychiatric: Normal Affect      -------------------------------------------------- RESULTS -------------------------------------------------  I have personally reviewed all laboratory and imaging results for this patient. Results are listed below. LABS: (Lab results interpreted by me)  No results found for this visit on 02/26/21.,       RADIOLOGY:  Interpreted by Radiologist unless otherwise specified  No orders to display       ------------------------- NURSING NOTES AND VITALS REVIEWED ---------------------------   The nursing notes within the ED encounter and vital signs as below have been reviewed by myself  /84   Pulse 96   Temp 97 °F (36.1 °C) (Temporal)   Resp 16   SpO2 98%     The patients available past medical records and past encounters were reviewed. ------------------------------ ED COURSE/MEDICAL DECISION MAKING----------------------  Medications   orphenadrine (NORFLEX) injection 60 mg (has no administration in time range)   dexamethasone (PF) (DECADRON) injection 10 mg (has no administration in time range)   ketorolac (TORADOL) injection 15 mg (has no administration in time range)         Medical Decision Making:     The patient was seen and evaluated by the Attending Emergency Medicine Physician Dr. Madelyn Hercules. The patient is a 61-year-old male who presents to the emergency department complaining of back pain and leg pain. He is hemodynamically stable, nontoxic in appearance, and in no acute distress. There were no red flag back pain signs or symptoms. Patient states this feels like his chronic low back pain that has worsened.   His pain is in the paraspinal musculature and there is no midline tenderness. No focal neurological deficits. Treated him with a dose of Norflex, Decadron, and Toradol. The patient did request a prescription for narcotics as well, but discussed with him that he would need to follow-up with pain management for this. Did provide him with prescription for muscle relaxer and lidocaine patches. He is to follow-up with Dr. Jelly Bruner on Monday. He is to return here if you experience any significant worsening symptoms or other acute symptoms or concerns. The patient verbalized understanding and agreement to treatment plan discharge instructions. Re-Evaluations: The patient states that he was feeling better after the medications were administered. This patient's ED course included: a personal history and physicial examination and re-evaluation prior to disposition    This patient has remained hemodynamically stable during their ED course. Counseling: The emergency provider has spoken with the patient and discussed todays results, in addition to providing specific details for the plan of care and counseling regarding the diagnosis and prognosis. Questions are answered at this time and they are agreeable with the plan.       --------------------------------- IMPRESSION AND DISPOSITION ---------------------------------    IMPRESSION  1. Acute exacerbation of chronic low back pain        DISPOSITION  Disposition: Discharge to home  Patient condition is stable        NOTE: This report was transcribed using voice recognition software.  Every effort was made to ensure accuracy; however, inadvertent computerized transcription errors may be present       Bertha Alegria DO  Resident  02/26/21 [id number]

## 2021-02-27 NOTE — ED NOTES
Hx of sciatica in both legs. Pt has many rods and screws in back, also replaced both knees. Pt states he had a reaction to a tramadol that he took Thursday night, states he has not been to sleep since. Also states his sciaticain both legs is a 10/10.      Fortunato Eisenmenger, RN  02/26/21 [id number]

## 2021-03-15 ENCOUNTER — HOSPITAL ENCOUNTER (OUTPATIENT)
Age: 71
Discharge: HOME | End: 2021-03-17

## 2021-03-15 PROCEDURE — 88305 TISSUE EXAM BY PATHOLOGIST: CPT

## 2021-05-25 ENCOUNTER — HOSPITAL ENCOUNTER (EMERGENCY)
Age: 71
Discharge: HOME | End: 2021-05-25
Payer: MEDICARE

## 2021-05-25 VITALS
HEART RATE: 88 BPM | HEIGHT: 69 IN | DIASTOLIC BLOOD PRESSURE: 75 MMHG | TEMPERATURE: 97.1 F | RESPIRATION RATE: 16 BRPM | BODY MASS INDEX: 37.18 KG/M2 | OXYGEN SATURATION: 96 % | SYSTOLIC BLOOD PRESSURE: 140 MMHG | WEIGHT: 251 LBS

## 2021-05-25 DIAGNOSIS — G89.29 ACUTE EXACERBATION OF CHRONIC LOW BACK PAIN: Primary | ICD-10-CM

## 2021-05-25 DIAGNOSIS — M54.50 ACUTE EXACERBATION OF CHRONIC LOW BACK PAIN: Primary | ICD-10-CM

## 2021-05-25 PROCEDURE — 99283 EMERGENCY DEPT VISIT LOW MDM: CPT

## 2021-05-25 PROCEDURE — 96372 THER/PROPH/DIAG INJ SC/IM: CPT

## 2021-05-25 PROCEDURE — 6360000002 HC RX W HCPCS: Performed by: NURSE PRACTITIONER

## 2021-05-25 RX ORDER — ORPHENADRINE CITRATE 30 MG/ML
60 INJECTION INTRAMUSCULAR; INTRAVENOUS ONCE
Status: COMPLETED | OUTPATIENT
Start: 2021-05-25 | End: 2021-05-25

## 2021-05-25 RX ORDER — METHYLPREDNISOLONE 4 MG/1
TABLET ORAL
Qty: 1 KIT | Refills: 0 | Status: SHIPPED | OUTPATIENT
Start: 2021-05-25 | End: 2021-05-31

## 2021-05-25 RX ORDER — DEXAMETHASONE SODIUM PHOSPHATE 10 MG/ML
8 INJECTION, SOLUTION INTRAMUSCULAR; INTRAVENOUS ONCE
Status: COMPLETED | OUTPATIENT
Start: 2021-05-25 | End: 2021-05-25

## 2021-05-25 RX ORDER — CYCLOBENZAPRINE HCL 5 MG
5 TABLET ORAL 2 TIMES DAILY PRN
Qty: 10 TABLET | Refills: 0 | Status: SHIPPED | OUTPATIENT
Start: 2021-05-25 | End: 2021-06-04

## 2021-05-25 RX ADMIN — HYDROMORPHONE HYDROCHLORIDE 1 MG: 1 INJECTION, SOLUTION INTRAMUSCULAR; INTRAVENOUS; SUBCUTANEOUS at 20:36

## 2021-05-25 RX ADMIN — ORPHENADRINE CITRATE 60 MG: 30 INJECTION INTRAMUSCULAR; INTRAVENOUS at 20:35

## 2021-05-25 RX ADMIN — DEXAMETHASONE SODIUM PHOSPHATE 8 MG: 10 INJECTION, SOLUTION INTRAMUSCULAR; INTRAVENOUS at 20:37

## 2021-05-25 ASSESSMENT — PAIN DESCRIPTION - ORIENTATION: ORIENTATION: RIGHT;LOWER

## 2021-05-25 ASSESSMENT — PAIN SCALES - GENERAL
PAINLEVEL_OUTOF10: 10
PAINLEVEL_OUTOF10: 10

## 2021-05-25 ASSESSMENT — PAIN DESCRIPTION - LOCATION: LOCATION: BACK

## 2021-05-26 ENCOUNTER — HOSPITAL ENCOUNTER (EMERGENCY)
Age: 71
Discharge: HOME | End: 2021-05-26
Attending: EMERGENCY MEDICINE
Payer: MEDICARE

## 2021-05-26 VITALS
HEART RATE: 88 BPM | OXYGEN SATURATION: 98 % | TEMPERATURE: 98.2 F | RESPIRATION RATE: 18 BRPM | DIASTOLIC BLOOD PRESSURE: 72 MMHG | SYSTOLIC BLOOD PRESSURE: 124 MMHG

## 2021-05-26 DIAGNOSIS — M54.41 CHRONIC LOW BACK PAIN WITH BILATERAL SCIATICA, UNSPECIFIED BACK PAIN LATERALITY: Primary | ICD-10-CM

## 2021-05-26 DIAGNOSIS — G89.29 CHRONIC LOW BACK PAIN WITH BILATERAL SCIATICA, UNSPECIFIED BACK PAIN LATERALITY: Primary | ICD-10-CM

## 2021-05-26 DIAGNOSIS — M54.42 CHRONIC LOW BACK PAIN WITH BILATERAL SCIATICA, UNSPECIFIED BACK PAIN LATERALITY: Primary | ICD-10-CM

## 2021-05-26 DIAGNOSIS — M54.16 LUMBAR RADICULOPATHY: ICD-10-CM

## 2021-05-26 PROCEDURE — 6370000000 HC RX 637 (ALT 250 FOR IP): Performed by: EMERGENCY MEDICINE

## 2021-05-26 PROCEDURE — 96372 THER/PROPH/DIAG INJ SC/IM: CPT

## 2021-05-26 PROCEDURE — 6360000002 HC RX W HCPCS: Performed by: EMERGENCY MEDICINE

## 2021-05-26 PROCEDURE — 99282 EMERGENCY DEPT VISIT SF MDM: CPT

## 2021-05-26 RX ORDER — OXYCODONE HYDROCHLORIDE AND ACETAMINOPHEN 5; 325 MG/1; MG/1
1 TABLET ORAL ONCE
Status: COMPLETED | OUTPATIENT
Start: 2021-05-26 | End: 2021-05-26

## 2021-05-26 RX ADMIN — OXYCODONE HYDROCHLORIDE AND ACETAMINOPHEN 1 TABLET: 5; 325 TABLET ORAL at 20:41

## 2021-05-26 RX ADMIN — HYDROMORPHONE HYDROCHLORIDE 1 MG: 1 INJECTION, SOLUTION INTRAMUSCULAR; INTRAVENOUS; SUBCUTANEOUS at 20:41

## 2021-05-26 ASSESSMENT — PAIN DESCRIPTION - DESCRIPTORS: DESCRIPTORS: PINS AND NEEDLES;THROBBING

## 2021-05-26 ASSESSMENT — PAIN DESCRIPTION - PAIN TYPE: TYPE: ACUTE PAIN

## 2021-05-26 ASSESSMENT — PAIN DESCRIPTION - LOCATION: LOCATION: FOOT;LEG

## 2021-05-26 ASSESSMENT — PAIN SCALES - GENERAL
PAINLEVEL_OUTOF10: 6
PAINLEVEL_OUTOF10: 6

## 2021-05-26 NOTE — ED NOTES
Bed: [facility]  Expected date:   Expected time:   Means of arrival:   Comments:      Azeem Ramirez RN  05/26/21 [id number]

## 2021-05-26 NOTE — ED PROVIDER NOTES
[facility]  Department of Emergency Medicine   ED  Encounter Note  Admit Date/RoomTime: 2021  7:07 PM  ED Room: 15/15    NAME: Author Lo  : 1950  MRN: [id number]     Chief Complaint:  Back Pain (right lower back pain radiating down right leg-  goes to pain clinic  out of percocet 5mg  cannot get any until )    History of Present Illness        Author Lo is a 78 y.o. old male with a prior history of chronic back pain and prior herniated disks and multiple surgeries to the lumbar spine patient also has a stimulator, presents to the emergency department by private vehicle, for traumatic acute-on-chronic, aching and cramping bilateral, central lower lumbar spine pain without radiation, for several day(s) prior to arrival.  There has been no recent injury as it relates to today's visit. Since onset the symptoms have been stable and is moderate in severity. He has associated signs & symptoms of nothing additional.   He denies any bladder or bowel incontinence , new weakness, tingling or paresthesias, recent back injection, recent spinal surgery, recent spinal/chiropractic manipulation or fever. The pain is aggraveated by nothing in particular and relieved by nothing in particular. He is currently enrolled in an pain management program.    .  ROS   Pertinent positives and negatives are stated within HPI, all other systems reviewed and are negative. Past Medical History:  has a past medical history of Anxiety, Back pain, Diabetes mellitus ([address] Hyperlipidemia, and Hypertension. Surgical History:  has a past surgical history that includes back surgery; knee surgery; other surgical history (2014); Carpal tunnel release; and hip surgery (Right). Social History:  reports that he quit smoking about 7 years ago. He has a 20.00 pack-year smoking history.  He has never used smokeless tobacco. He reports that he does not drink alcohol and does not use drugs. Family History: family history is not on file. Allergies: Vicodin [hydrocodone-acetaminophen]    Physical Exam   Oxygen Saturation Interpretation: Normal.        ED Triage Vitals   BP Temp Temp Source Pulse Resp SpO2 Height Weight   05/25/21 1902 05/25/21 1902 05/25/21 1902 05/25/21 1902 05/25/21 1902 05/25/21 1902 05/25/21 1914 05/25/21 1914   137/74 97.1 °F (36.2 °C) Infrared 96 16 97 % 5' 9\" (1.753 m) 251 lb (113.9 kg)         Constitutional:  Alert, development consistent with age. HEENT:  NC/NT. Airway patent. Neck:  Normal ROM. Supple. Respiratory:  Clear to auscultation and breath sounds equal.  CV:  Regular rate and rhythm, normal heart sounds, without pathological murmurs, ectopy, gallops, or rubs. GI:  Abdomen Soft, nontender, good bowel sounds. No firm or pulsatile mass. Back: lower lumbar spine bilateral.             Tenderness: Moderate. Swelling: no.              Range of Motion: full range with pain. CVA Tenderness: No.            Straight leg raising:  Bilateral negative. Skin:  no erythema, rash or swelling noted. Distal Function:              Motor deficit: none. Sensory deficit: none. Pulse deficit: none. Calf Tenderness:  No Bilateral.               Edema:  none Both lower extremity(s). Deep Tendon Reflexes (DTR's) to bilateral knee's and ankle's are normo-reflexive   Gait:  normal.  Integument:  Normal turgor. Warm, dry, without visible rash. Lymphatics: No lymphangitis or adenopathy noted. Neurological:  Oriented. Motor functions intact. Lab / Imaging Results   (All laboratory and radiology results have been personally reviewed by myself)  Labs:  No results found for this visit on 05/25/21. Imaging: All Radiology results interpreted by Radiologist unless otherwise noted.   No orders to display       ED Course / Medical Decision Making     Medications   orphenadrine (NORFLEX) injection 60 mg (60 mg Intramuscular Given 5/25/21 2035)   dexamethasone (PF) (DECADRON) injection 8 mg (8 mg Intramuscular Given 5/25/21 2037)   HYDROmorphone (DILAUDID) injection 1 mg (1 mg Intramuscular Given 5/25/21 2036)        Re-examination:  5/25/21       Time:  2035  Patients condition is improving. Consult(s):   None    Procedure(s):   none    Medical Decision Making/Counseling:    *At this time the patient is without objective evidence of an acute process requiring inpatient management. The emergency provider has spoken with the patient and discussed todays emergency visit, in addition to providing specific details for the plan of care and counseling regarding the diagnosis and prognosis. He was counseled on the role of the emergency department regarding prescribing medications for chronic conditions including Narcotic and other controlled substances. Based on the presenting complaint and nature of illness, the requested medications will not be provided today in prescription form and he is instructed to contact their provider for supplemental medications as soon as possible. Questions are answered at this time and they are agreeable with the plan. Assessment      1. Acute exacerbation of chronic low back pain      Plan   Discharge home. Patient condition is good    New Medications     Discharge Medication List as of 5/25/2021  8:59 PM      START taking these medications    Details   methylPREDNISolone (MEDROL, NOELLE,) 4 MG tablet Take by mouth., Disp-1 kit, R-0Normal      cyclobenzaprine (FLEXERIL) 5 MG tablet Take 1 tablet by mouth 2 times daily as needed for Muscle spasms, Disp-10 tablet, R-0Normal           Electronically signed by SHIRA Pedraza CNP   DD: 5/26/21  **This report was transcribed using voice recognition software. Every effort was made to ensure accuracy; however, inadvertent computerized transcription errors may be present.   END OF ED PROVIDER NOTE      Jamie Askew

## 2021-05-27 NOTE — ED PROVIDER NOTES
HPI:  5/26/21,   Time: 8:09 PM EDT         Angel Vargas is a 78 y.o. male presenting to the ED for acute exacerbation of chronic low back pain, beginning more than several days ago. The complaint has been persistent, severe in severity, and worsened by sitting. Patient was here yesterday and has a history of chronic back pain has a nerve stimulator and is under pain management but is out of his pain medicine and they will not fill it until the 29th of this month. He was here in the emergency room yesterday received a shot of Dilaudid as well as Norflex and a shot of steroids. He states he is having numbness and tingling of his lower legs although he is able to ambulate well. He states that sitting for long periods of time makes it worse. No fever no chills no bowel or bladder problems. ROS:   Pertinent positives and negatives are stated within HPI, all other systems reviewed and are negative.  --------------------------------------------- PAST HISTORY ---------------------------------------------  Past Medical History:  has a past medical history of Anxiety, Back pain, Diabetes mellitus ([facility] [address] Hyperlipidemia, and Hypertension. Past Surgical History:  has a past surgical history that includes back surgery; knee surgery; other surgical history (5/8/2014); Carpal tunnel release; and hip surgery (Right). Social History:  reports that he quit smoking about 7 years ago. He has a 20.00 pack-year smoking history. He has never used smokeless tobacco. He reports that he does not drink alcohol and does not use drugs. Family History: family history is not on file. The patients home medications have been reviewed.     Allergies: Vicodin [hydrocodone-acetaminophen]    -------------------------------------------------- RESULTS -------------------------------------------------  All laboratory and radiology results have been personally reviewed by myself   LABS:  No results found for this visit on 05/26/21. RADIOLOGY:  Interpreted by Radiologist.  No orders to display       ------------------------- NURSING NOTES AND VITALS REVIEWED ---------------------------   The nursing notes within the ED encounter and vital signs as below have been reviewed. /72   Pulse 88   Temp 98.2 °F (36.8 °C) (Temporal)   Resp 18   SpO2 98%   Oxygen Saturation Interpretation: Normal      ---------------------------------------------------PHYSICAL EXAM--------------------------------------      Constitutional/General: Alert and oriented x3, well appearing, non toxic in NAD  Head: NC/AT  Eyes: PERRL, EOMI  Mouth: Oropharynx clear, handling secretions, no trismus  Neck: Supple, full ROM, no meningeal signs  Pulmonary: Lungs clear to auscultation bilaterally, no wheezes, rales, or rhonchi. Not in respiratory distress  Cardiovascular:  Regular rate and rhythm, no murmurs, gallops, or rubs. 2+ distal pulses  Abdomen: Soft, non tender, non distended,   Extremities: Moves all extremities x 4. Warm and well perfused  Skin: warm and dry without rash  Neurologic: GCS 15,  Psych: Normal Affect      ------------------------------ ED COURSE/MEDICAL DECISION MAKING----------------------  Medications   HYDROmorphone (DILAUDID) injection 1 mg (1 mg Intramuscular Given 5/26/21 2041)   oxyCODONE-acetaminophen (PERCOCET) 5-325 MG per tablet 1 tablet (1 tablet Oral Given 5/26/21 2041)         Medical Decision Making:    Examination and medication for pain    Counseling: The emergency provider has spoken with the patient and discussed todays results, in addition to providing specific details for the plan of care and counseling regarding the diagnosis and prognosis. Questions are answered at this time and they are agreeable with the plan.      --------------------------------- IMPRESSION AND DISPOSITION ---------------------------------    IMPRESSION  1.  Chronic low back pain with bilateral sciatica, unspecified back pain laterality

## 2021-08-14 ENCOUNTER — HOSPITAL ENCOUNTER (EMERGENCY)
Age: 71
Discharge: HOME | End: 2021-08-14
Attending: STUDENT IN AN ORGANIZED HEALTH CARE EDUCATION/TRAINING PROGRAM
Payer: MEDICARE

## 2021-08-14 ENCOUNTER — APPOINTMENT (OUTPATIENT)
Dept: GENERAL RADIOLOGY | Age: 71
End: 2021-08-14
Payer: MEDICARE

## 2021-08-14 VITALS
TEMPERATURE: 96.5 F | OXYGEN SATURATION: 98 % | HEIGHT: 69 IN | WEIGHT: 260 LBS | SYSTOLIC BLOOD PRESSURE: 130 MMHG | HEART RATE: 72 BPM | BODY MASS INDEX: 38.51 KG/M2 | DIASTOLIC BLOOD PRESSURE: 82 MMHG | RESPIRATION RATE: 18 BRPM

## 2021-08-14 DIAGNOSIS — M54.41 CHRONIC RIGHT-SIDED LOW BACK PAIN WITH RIGHT-SIDED SCIATICA: Primary | ICD-10-CM

## 2021-08-14 DIAGNOSIS — G89.29 CHRONIC RIGHT-SIDED LOW BACK PAIN WITH RIGHT-SIDED SCIATICA: Primary | ICD-10-CM

## 2021-08-14 PROCEDURE — 72100 X-RAY EXAM L-S SPINE 2/3 VWS: CPT

## 2021-08-14 PROCEDURE — 96372 THER/PROPH/DIAG INJ SC/IM: CPT

## 2021-08-14 PROCEDURE — 6360000002 HC RX W HCPCS: Performed by: STUDENT IN AN ORGANIZED HEALTH CARE EDUCATION/TRAINING PROGRAM

## 2021-08-14 PROCEDURE — 6370000000 HC RX 637 (ALT 250 FOR IP): Performed by: STUDENT IN AN ORGANIZED HEALTH CARE EDUCATION/TRAINING PROGRAM

## 2021-08-14 PROCEDURE — 99283 EMERGENCY DEPT VISIT LOW MDM: CPT

## 2021-08-14 RX ORDER — IBUPROFEN 600 MG/1
600 TABLET ORAL 4 TIMES DAILY PRN
Qty: 40 TABLET | Refills: 0 | Status: SHIPPED | OUTPATIENT
Start: 2021-08-14 | End: 2022-09-11

## 2021-08-14 RX ORDER — METHOCARBAMOL 500 MG/1
750 TABLET, FILM COATED ORAL 3 TIMES DAILY
Qty: 15 TABLET | Refills: 0 | Status: SHIPPED | OUTPATIENT
Start: 2021-08-14 | End: 2021-08-19

## 2021-08-14 RX ORDER — OXYCODONE HYDROCHLORIDE AND ACETAMINOPHEN 5; 325 MG/1; MG/1
1 TABLET ORAL EVERY 4 HOURS PRN
COMMUNITY

## 2021-08-14 RX ORDER — ORPHENADRINE CITRATE 30 MG/ML
60 INJECTION INTRAMUSCULAR; INTRAVENOUS ONCE
Status: COMPLETED | OUTPATIENT
Start: 2021-08-14 | End: 2021-08-14

## 2021-08-14 RX ORDER — PREGABALIN 100 MG/1
100 CAPSULE ORAL 2 TIMES DAILY
COMMUNITY

## 2021-08-14 RX ORDER — IBUPROFEN 600 MG/1
600 TABLET ORAL ONCE
Status: COMPLETED | OUTPATIENT
Start: 2021-08-14 | End: 2021-08-14

## 2021-08-14 RX ADMIN — ORPHENADRINE CITRATE 60 MG: 60 INJECTION INTRAMUSCULAR; INTRAVENOUS at 08:15

## 2021-08-14 RX ADMIN — HYDROMORPHONE HYDROCHLORIDE 1 MG: 1 INJECTION, SOLUTION INTRAMUSCULAR; INTRAVENOUS; SUBCUTANEOUS at 08:12

## 2021-08-14 RX ADMIN — IBUPROFEN 600 MG: 600 TABLET ORAL at 08:11

## 2021-08-14 ASSESSMENT — PAIN SCALES - GENERAL: PAINLEVEL_OUTOF10: 10

## 2021-08-14 NOTE — ED PROVIDER NOTES
Department of Emergency Medicine   ED  Provider Note  Admit Date/RoomTime: 8/14/2021  7:32 AM  ED Room: 11/11          History of Present Illness:  8/14/21, Time: 7:55 AM EDT  Chief Complaint   Patient presents with    Back Pain     fell out of bed last night hurting his back. Took a percocet at 2 am without relief    Fall         Jevon Sylvester is a 73 y.o. male presenting to the ED for Back pain. The patient states that he may have rolled out of bed last night when he was sleeping. He has pain in his right lower back region. This is chronic for him. He also has right lumbar radiculopathy. He states that he has no weakness in the bilateral lower extremities nor does he have any incontinence, fevers chills or myalgias. He does not use IV drugs. No exacerbating or alleviating factors since onset. He does follow with the pain clinic and takes Percocet as needed. I reviewed a prescription drug prescribing report and confirmed his report of being on Percocet as an outpatient. He took 1 of these at 2 AM with some relief but he is still in significant pain. Denies having sustained any other injuries with this episode. Review of Systems:  Constitutional: Denies fevers  Musculoskeletal: Positive for back pain    --------------------------------------------- PAST HISTORY ---------------------------------------------  Past Medical History:  has a past medical history of Anxiety, Back pain, Diabetes mellitus ([facility] [address] Hyperlipidemia, and Hypertension. Past Surgical History:  has a past surgical history that includes back surgery; knee surgery; other surgical history (5/8/2014); Carpal tunnel release; and hip surgery (Right). Social History:  reports that he quit smoking about 7 years ago. He has a 20.00 pack-year smoking history. He has never used smokeless tobacco. He reports that he does not drink alcohol and does not use drugs. Family History: family history is not on file. . Unless otherwise noted, family history is non contributory    The patients home medications have been reviewed. Allergies: Vicodin [hydrocodone-acetaminophen]    I have reviewed the past medical history, past surgical history, social history, and family history    ---------------------------------------------------PHYSICAL EXAM--------------------------------------    Constitutional: Appears in no distress  Head: Normocephalic, atraumatic  Eyes: Non-icteric slcera, no conjunctival injection  ENT: Moist mucous membranes,  Neck: Trachea midline, no JVD  Respiratory: Nonlabored respirations. Lungs clear to auscultation bilaterally, no wheezes, rales, or rhonchi. Cardiovascular: Regular rate. Regular rhythm. No murmurs, no gallops, no rubs. Gastrointestinal: Abdomen Soft, Non tender, Non distended. No rebound tenderness, guarding, or rigidity. Extremities: No lower extremity edema  Genitourinary: No CVA tenderness, no suprapubic tenderness  Musculoskeletal: Scar in the midline low back from prior back surgery, bilateral lumbar paraspinal regions are in spasm, there is positive straight leg raise on the right. 5/5 muscle strength to plantarflexion, dorsiflexion knee flexion and extension. Skin: Surgical scar in the midline low back, bilateral well-healed surgical scars from knee replacements. Neurologic: Alert, symmetric facies, no aphasia    -------------------------------------------------- RESULTS -------------------------------------------------  I have personally reviewed all laboratory and imaging results for this patient. Results are listed below. LABS: (Lab results interpreted by me)  No results found for this visit on 08/14/21.,       RADIOLOGY:  Interpreted by Radiologist unless otherwise specified  XR LUMBAR SPINE (2-3 VIEWS)   Final Result   1. Degenerative and postsurgical changes including posterior fusion at L1   through L5.   Slight grade 1 retrolisthesis at L1-2 and L2-3.   2. No evidence of acute osseous abnormality.               ------------------------- NURSING NOTES AND VITALS REVIEWED ---------------------------   The nursing notes within the ED encounter and vital signs as below have been reviewed by myself  BP (!) 148/90   Pulse 75   Temp 96.5 °F (35.8 °C) (Infrared)   Resp 16   Ht 5' 9\" (1.753 m)   Wt 260 lb (117.9 kg)   SpO2 96%   BMI 38.40 kg/m²     Oxygen Saturation Interpretation: Normal    The patients available past medical records and past encounters were reviewed. ------------------------------ ED COURSE/MEDICAL DECISION MAKING----------------------  Medications   HYDROmorphone (DILAUDID) injection 1 mg (1 mg Intramuscular Given 8/14/21 0812)   orphenadrine (NORFLEX) injection 60 mg (60 mg Intramuscular Given 8/14/21 0815)   ibuprofen (ADVIL;MOTRIN) tablet 600 mg (600 mg Oral Given 8/14/21 2875)        This patient's ED course included:a personal history and physicial examination    This patient has remained hemodynamically stable during their ED course. Consultations:  None      Medical Decision Making:   Patient presents emergency department after falling out of bed in the setting of chronic back pain and multiple back surgeries. Vital signs interpreted by me as hypertensive otherwise normal.    History and physical examination findings consistent with acute on chronic low back pain. Will obtain a plain film of the lumbar spine as the patient has hardware in his low back. He is known to Dr. Price Haque. He has no concerning findings on clinical exam.  He is given IV and a shot of Dilaudid as well as Norflex and oral ibuprofen. Imaging: Imaging reviewed by myself as well as radiology. No acute fracture or dislocation is noted. No obvious structural or hardware damage. Patient symptomatology is significantly improved. Will be discharged with outpatient follow-up.  Patient will be driven home by his wife given that he was given narcotics here in the emergency department. Counseling: The emergency provider has spoken with the patient and discussed todays results, in addition to providing specific details for the plan of care and counseling regarding the diagnosis and prognosis. Questions are answered at this time and they are agreeable with the plan.       --------------------------------- IMPRESSION AND DISPOSITION ---------------------------------    IMPRESSION  1. Chronic right-sided low back pain with right-sided sciatica        DISPOSITION  Disposition: Discharge to home  Patient condition is stable    IDr. Koby, am the primary provider of record    Koby Franco DO  Emergency Medicine    NOTE: This report was transcribed using voice recognition software.  Every effort was made to ensure accuracy; however, inadvertent computerized transcription errors may be present       Alvarado Stevens DO  08/14/21 0900

## 2021-09-14 ENCOUNTER — HOSPITAL ENCOUNTER (EMERGENCY)
Age: 71
Discharge: HOME | End: 2021-09-14
Attending: EMERGENCY MEDICINE
Payer: MEDICARE

## 2021-09-14 VITALS
TEMPERATURE: 97.1 F | RESPIRATION RATE: 18 BRPM | WEIGHT: 255 LBS | HEART RATE: 82 BPM | BODY MASS INDEX: 37.77 KG/M2 | DIASTOLIC BLOOD PRESSURE: 80 MMHG | OXYGEN SATURATION: 95 % | SYSTOLIC BLOOD PRESSURE: 144 MMHG | HEIGHT: 69 IN

## 2021-09-14 DIAGNOSIS — S39.012A STRAIN OF LUMBAR REGION, INITIAL ENCOUNTER: ICD-10-CM

## 2021-09-14 DIAGNOSIS — M54.50 ACUTE EXACERBATION OF CHRONIC LOW BACK PAIN: ICD-10-CM

## 2021-09-14 DIAGNOSIS — M54.30 SCIATICA, UNSPECIFIED LATERALITY: Primary | ICD-10-CM

## 2021-09-14 DIAGNOSIS — G89.29 ACUTE EXACERBATION OF CHRONIC LOW BACK PAIN: ICD-10-CM

## 2021-09-14 PROCEDURE — 6360000002 HC RX W HCPCS: Performed by: EMERGENCY MEDICINE

## 2021-09-14 PROCEDURE — 96372 THER/PROPH/DIAG INJ SC/IM: CPT

## 2021-09-14 PROCEDURE — 6370000000 HC RX 637 (ALT 250 FOR IP): Performed by: EMERGENCY MEDICINE

## 2021-09-14 PROCEDURE — 99284 EMERGENCY DEPT VISIT MOD MDM: CPT

## 2021-09-14 RX ORDER — CYCLOBENZAPRINE HCL 10 MG
10 TABLET ORAL 3 TIMES DAILY PRN
Qty: 20 TABLET | Refills: 0 | Status: SHIPPED | OUTPATIENT
Start: 2021-09-14 | End: 2021-09-24

## 2021-09-14 RX ORDER — ONDANSETRON 4 MG/1
8 TABLET, ORALLY DISINTEGRATING ORAL ONCE
Status: COMPLETED | OUTPATIENT
Start: 2021-09-14 | End: 2021-09-14

## 2021-09-14 RX ORDER — CYCLOBENZAPRINE HCL 10 MG
10 TABLET ORAL ONCE
Status: COMPLETED | OUTPATIENT
Start: 2021-09-14 | End: 2021-09-14

## 2021-09-14 RX ADMIN — HYDROMORPHONE HYDROCHLORIDE 1 MG: 1 INJECTION, SOLUTION INTRAMUSCULAR; INTRAVENOUS; SUBCUTANEOUS at 22:25

## 2021-09-14 RX ADMIN — ONDANSETRON 8 MG: 4 TABLET, ORALLY DISINTEGRATING ORAL at 22:25

## 2021-09-14 RX ADMIN — CYCLOBENZAPRINE 10 MG: 10 TABLET, FILM COATED ORAL at 22:25

## 2021-09-14 ASSESSMENT — PAIN SCALES - GENERAL
PAINLEVEL_OUTOF10: 10
PAINLEVEL_OUTOF10: 10

## 2021-09-14 ASSESSMENT — PAIN DESCRIPTION - PAIN TYPE: TYPE: ACUTE PAIN

## 2021-09-14 ASSESSMENT — PAIN DESCRIPTION - DESCRIPTORS: DESCRIPTORS: ACHING;NUMBNESS

## 2021-09-14 ASSESSMENT — PAIN DESCRIPTION - LOCATION: LOCATION: BACK;LEG

## 2021-09-14 ASSESSMENT — PAIN DESCRIPTION - ORIENTATION: ORIENTATION: RIGHT

## 2021-09-15 NOTE — ED PROVIDER NOTES
HPI:  9/14/21, Time: 10:08 PM EDT        Reta Jamil is a 73 y.o. male presenting to the ED for flareup of sciatic back pain, beginning 1 to 2 days ago. The complaint has been persistent, severe in severity, and worsened by changing position, bending, twisting. Patient is not had any fecal incontinence nor any urinary retention nor any saddle anesthesia. Patient has history of multiple back surgeries previously and has been followed by his back surgeon (Dr. Gerardo Emerson) patient is I had any recent trauma or falls. Pain is radiating down the right leg. Patient denies any other complaints. No relieving factors reported. Patient rates his pain a 10/10 severity. Review of Systems:   A complete review of systems was performed and pertinent positives and negatives are stated within HPI, all other systems reviewed and are negative.    --------------------------------------------- PAST HISTORY ---------------------------------------------  Past Medical History:  has a past medical history of Anxiety, Back pain, Diabetes mellitus ([address] Hyperlipidemia, and Hypertension. Past Surgical History:  has a past surgical history that includes back surgery; knee surgery; other surgical history (5/8/2014); Carpal tunnel release; and hip surgery (Right). Social History:  reports that he quit smoking about 7 years ago. He has a 20.00 pack-year smoking history. He has never used smokeless tobacco. He reports that he does not drink alcohol and does not use drugs. Family History: family history is not on file. The patients home medications have been reviewed. Allergies: Vicodin [hydrocodone-acetaminophen]    -------------------------------------------------- RESULTS -------------------------------------------------  All laboratory and radiology results have been personally reviewed by myself   LABS:  No results found for this visit on 09/14/21.     RADIOLOGY:  Interpreted by Radiologist.  No orders to display ------------------------- NURSING NOTES AND VITALS REVIEWED ---------------------------    The nursing notes within the ED encounter and vital signs as below have been reviewed. BP (!) 144/80   Pulse 82   Temp 97.1 °F (36.2 °C) (Temporal)   Resp 18   Ht 5' 9\" (1.753 m)   Wt 255 lb (115.7 kg)   SpO2 95%   BMI 37.66 kg/m²   Oxygen Saturation Interpretation: Normal      ---------------------------------------------------PHYSICAL EXAM--------------------------------------      Constitutional/General: Alert and oriented x3, well appearing, non toxic in moderate distress   Head: Normocephalic and atraumatic  Eyes: PERRL, EOMI  Mouth: Oropharynx clear, handling secretions, no trismus  Neck: Supple, full ROM, no JVD. Trachea midline  Pulmonary: Lungs clear to auscultation bilaterally, no wheezes, rales, or rhonchi. Not in respiratory distress  Cardiovascular:  Regular rate and rhythm, no murmurs, gallops, or rubs. 2+ distal pulses  GI: Abdomen soft non tender, non distended, no organomegaly, no mass no rebound guarding or rigidity normal bowel sounds  Extremities: Moves all extremities x 4. Warm and well perfused; healing surgical scars bilateral knees also there is surgical scar in the lower midline he does have some palpable spasm on muscular tension palpation of the right lower lumbar area. Negative straight leg raises bilaterally. No motor weakness of lower extremities. Skin: warm and dry without rash  Neurologic: GCS 15, paresthesias are intact no focal deficits. DTRs 2+ bilaterally, knee jerk reflex testing at the bedside. No sensory deficits lower extremities.   Psych: Normal Affect      ------------------------------ ED COURSE/MEDICAL DECISION MAKING----------------------  Medications   HYDROmorphone (DILAUDID) injection 1 mg (1 mg IntraMUSCular Given 9/14/21 2225)   ondansetron (ZOFRAN-ODT) disintegrating tablet 8 mg (8 mg Oral Given 9/14/21 2225)   cyclobenzaprine (FLEXERIL) tablet 10 mg (10 mg

## 2021-09-15 NOTE — ED NOTES
Chronic low back pain with radiation down right leg, pt had deep tissue massage and pain now worse, percocet did not help pain, pt able to ambulate but states right leg is numb, hot with paresthesia to foot     Suma Bellamy RN  09/14/21 2110

## 2021-11-05 ENCOUNTER — HOSPITAL ENCOUNTER (EMERGENCY)
Age: 71
Discharge: HOME | End: 2021-11-05
Payer: MEDICARE

## 2021-11-05 VITALS
WEIGHT: 270 LBS | TEMPERATURE: 97.8 F | OXYGEN SATURATION: 97 % | SYSTOLIC BLOOD PRESSURE: 102 MMHG | BODY MASS INDEX: 39.99 KG/M2 | HEIGHT: 69 IN | RESPIRATION RATE: 16 BRPM | HEART RATE: 78 BPM | DIASTOLIC BLOOD PRESSURE: 60 MMHG

## 2021-11-05 DIAGNOSIS — M54.41 CHRONIC LOW BACK PAIN WITH RIGHT-SIDED SCIATICA, UNSPECIFIED BACK PAIN LATERALITY: Primary | ICD-10-CM

## 2021-11-05 DIAGNOSIS — G89.29 CHRONIC LOW BACK PAIN WITH RIGHT-SIDED SCIATICA, UNSPECIFIED BACK PAIN LATERALITY: Primary | ICD-10-CM

## 2021-11-05 PROCEDURE — 99284 EMERGENCY DEPT VISIT MOD MDM: CPT

## 2021-11-05 PROCEDURE — 96372 THER/PROPH/DIAG INJ SC/IM: CPT

## 2021-11-05 PROCEDURE — 6370000000 HC RX 637 (ALT 250 FOR IP): Performed by: PHYSICIAN ASSISTANT

## 2021-11-05 PROCEDURE — 6360000002 HC RX W HCPCS: Performed by: PHYSICIAN ASSISTANT

## 2021-11-05 RX ORDER — ONDANSETRON 4 MG/1
4 TABLET, ORALLY DISINTEGRATING ORAL ONCE
Status: COMPLETED | OUTPATIENT
Start: 2021-11-05 | End: 2021-11-05

## 2021-11-05 RX ORDER — ORPHENADRINE CITRATE 30 MG/ML
60 INJECTION INTRAMUSCULAR; INTRAVENOUS ONCE
Status: COMPLETED | OUTPATIENT
Start: 2021-11-05 | End: 2021-11-05

## 2021-11-05 RX ADMIN — HYDROMORPHONE HYDROCHLORIDE 1 MG: 1 INJECTION, SOLUTION INTRAMUSCULAR; INTRAVENOUS; SUBCUTANEOUS at 15:34

## 2021-11-05 ASSESSMENT — PAIN DESCRIPTION - ORIENTATION: ORIENTATION: RIGHT

## 2021-11-05 ASSESSMENT — PAIN DESCRIPTION - PAIN TYPE: TYPE: CHRONIC PAIN

## 2021-11-05 ASSESSMENT — PAIN SCALES - GENERAL: PAINLEVEL_OUTOF10: 10

## 2021-11-05 ASSESSMENT — PAIN DESCRIPTION - LOCATION: LOCATION: BACK;HIP

## 2021-11-05 NOTE — ED PROVIDER NOTES
Independent:      HPI:  11/5/21, Time: 2:57 PM EDT         Katie Smith is a 73 y.o. male presenting to the ED for flareup of lower back and right hip pain, beginning worse over the last 1 to 2 days. Patient states he has had no specific injury. The patient has had multiple back surgeries over the years and does have an implantable pain stimulator. He does take chronic pain medications but states that his pain is currently not managed and he cannot take his current pain comes to ER requesting a pain injection. He denies any change or loss of his bowel or bladder function. He reports no numbness or tingling to his genital or anal region. The complaint has been constant, moderate to severe. He reports there is no comfortable position for him. He has had difficulty in sleeping and performing his normal ADLs due to his discomfort. He denies any recent fall, trauma or injury that caused a flare of his pain    Review of Systems:   A complete review of systems was performed and pertinent positives and negatives are stated within HPI, all other systems reviewed and are negative.      --------------------------------------------- PAST HISTORY ---------------------------------------------  Past Medical History:  has a past medical history of Anxiety, Back pain, Diabetes mellitus ([facility] [address] Hyperlipidemia, and Hypertension. Past Surgical History:  has a past surgical history that includes back surgery; knee surgery; other surgical history (5/8/2014); Carpal tunnel release; and hip surgery (Right). Social History:  reports that he quit smoking about 7 years ago. He has a 20.00 pack-year smoking history. He has never used smokeless tobacco. He reports that he does not drink alcohol and does not use drugs. Family History: family history is not on file. The patients home medications have been reviewed.     Allergies: Vicodin [hydrocodone-acetaminophen]    -------------------------------------------------- RESULTS with complaints of chronic back issues, patient has not implantable pain stimulator and despite this in chronic pain medications is requesting a pain injection for his breakthrough pain due to a current flareup. Patient given injection of Dilaudid, Norflex and will be discharged home with ride. Patient to continue his current pain medication regimen and close follow-up with his pain management doctor. Recommend continued treatment with home exercises, ice, rest.      Counseling: The emergency provider has spoken with the patient and discussed todays results, in addition to providing specific details for the plan of care and counseling regarding the diagnosis and prognosis. Questions are answered at this time and they are agreeable with the plan. Controlled Substance Monitoring:    Acute and Chronic Pain Monitoring:   RX Monitoring 11/5/2021   Attestation -   Acute Pain Prescriptions -   Periodic Controlled Substance Monitoring Possible medication side effects, risk of tolerance/dependence & alternative treatments discussed. Chronic Pain > 80 MEDD -         --------------------------------- IMPRESSION AND DISPOSITION ---------------------------------    IMPRESSION  1. Chronic low back pain with right-sided sciatica, unspecified back pain laterality        DISPOSITION  Disposition: Discharge to home  Patient condition is stable      NOTE: This report was transcribed using voice recognition software.  Every effort was made to ensure accuracy; however, inadvertent computerized transcription errors may be present       Brayan Longoria PA-C  11/06/21 5977

## 2021-12-19 ENCOUNTER — HOSPITAL ENCOUNTER (EMERGENCY)
Age: 71
Discharge: HOME | End: 2021-12-19
Payer: MEDICARE

## 2021-12-19 VITALS
WEIGHT: 279 LBS | RESPIRATION RATE: 16 BRPM | SYSTOLIC BLOOD PRESSURE: 141 MMHG | TEMPERATURE: 96.6 F | OXYGEN SATURATION: 96 % | BODY MASS INDEX: 41.32 KG/M2 | HEART RATE: 82 BPM | HEIGHT: 69 IN | DIASTOLIC BLOOD PRESSURE: 78 MMHG

## 2021-12-19 DIAGNOSIS — M54.50 ACUTE EXACERBATION OF CHRONIC LOW BACK PAIN: Primary | ICD-10-CM

## 2021-12-19 DIAGNOSIS — G89.29 ACUTE EXACERBATION OF CHRONIC LOW BACK PAIN: Primary | ICD-10-CM

## 2021-12-19 PROCEDURE — 96372 THER/PROPH/DIAG INJ SC/IM: CPT

## 2021-12-19 PROCEDURE — 99285 EMERGENCY DEPT VISIT HI MDM: CPT

## 2021-12-19 PROCEDURE — 6360000002 HC RX W HCPCS: Performed by: NURSE PRACTITIONER

## 2021-12-19 PROCEDURE — 6370000000 HC RX 637 (ALT 250 FOR IP): Performed by: NURSE PRACTITIONER

## 2021-12-19 RX ORDER — CYCLOBENZAPRINE HCL 10 MG
10 TABLET ORAL ONCE
Status: COMPLETED | OUTPATIENT
Start: 2021-12-19 | End: 2021-12-19

## 2021-12-19 RX ORDER — KETOROLAC TROMETHAMINE 30 MG/ML
60 INJECTION, SOLUTION INTRAMUSCULAR; INTRAVENOUS ONCE
Status: COMPLETED | OUTPATIENT
Start: 2021-12-19 | End: 2021-12-19

## 2021-12-19 RX ADMIN — KETOROLAC TROMETHAMINE 60 MG: 30 INJECTION, SOLUTION INTRAMUSCULAR at 17:15

## 2021-12-19 RX ADMIN — HYDROMORPHONE HYDROCHLORIDE 1 MG: 1 INJECTION, SOLUTION INTRAMUSCULAR; INTRAVENOUS; SUBCUTANEOUS at 17:16

## 2021-12-19 RX ADMIN — CYCLOBENZAPRINE 10 MG: 10 TABLET, FILM COATED ORAL at 17:15

## 2021-12-19 ASSESSMENT — PAIN SCALES - GENERAL
PAINLEVEL_OUTOF10: 8

## 2021-12-19 ASSESSMENT — PAIN DESCRIPTION - PAIN TYPE: TYPE: CHRONIC PAIN

## 2021-12-19 ASSESSMENT — PAIN DESCRIPTION - PROGRESSION: CLINICAL_PROGRESSION: GRADUALLY WORSENING

## 2021-12-19 ASSESSMENT — PAIN DESCRIPTION - LOCATION
LOCATION: BACK
LOCATION: BACK

## 2021-12-19 ASSESSMENT — PAIN DESCRIPTION - FREQUENCY
FREQUENCY: CONTINUOUS
FREQUENCY: INTERMITTENT

## 2021-12-19 ASSESSMENT — PAIN DESCRIPTION - DESCRIPTORS
DESCRIPTORS: SHOOTING
DESCRIPTORS: ACHING

## 2021-12-19 ASSESSMENT — PAIN DESCRIPTION - ORIENTATION: ORIENTATION: MID

## 2021-12-19 ASSESSMENT — PAIN DESCRIPTION - ONSET: ONSET: SUDDEN

## 2021-12-19 NOTE — ED PROVIDER NOTES
Alirio ESPINO Mary  1996    Chief Complaint (with details of present illness):  Patient presents with painless, progressive loss of vision consistent with aphakia in the right eye.     Current Facility-Administered Medications   Medication   • lactated ringers infusion   • HYDROmorphone (DILAUDID) PF injection 0.2-0.5 mg   • fentaNYL (SUBLIMAZE) injection 25-50 mcg   • HYDROmorphone (DILAUDID) PF injection 0.2-0.5 mg   • ondansetron (ZOFRAN) injection 4 mg   • prochlorperazine (COMPAZINE) injection 5-10 mg   • naLOXone (NARCAN) injection 0.2 mg   • hydrALAZINE (APRESOLINE) injection 5 mg   • labetalol (NORMODYNE) injection 5 mg   • diphenhydrAMINE (BENADRYL) injection 6.5-25 mg   • sodium chloride 0.9% infusion   • lactated ringers infusion     ALLERGIES:  No Known Allergies  History reviewed. No pertinent past medical history.  Past Surgical History:   Procedure Laterality Date   • CATARACT EXTRACTION W/ ANTERIOR VITRECTOMY  02/09/2000    OD aphakic   • EYE SURGERY  2/9/00    Lensectomy with anterior vitrectomy   • STRABISMUS SURGERY Right 03/29/2017    Superior rectus recession right eye 5.5 with one full tendon width nasal transposition to correct 16 diopters of right hypertropia with 10 degrees in cyclotorsion - Dr. Angeles     Family History   Problem Relation Age of Onset   • Cancer Maternal Grandmother      cervical       Vitals:   Visit Vitals  /53   Pulse 59   Temp 97.1 °F (36.2 °C) (Temporal Artery)   Resp 16   Ht 5' 10\" (1.778 m)   Wt 64.1 kg   SpO2 98%   BMI 20.28 kg/m²       Eyes:  Eye exam complete during last office visit documented in Epic .  Patient reports no changes.  Heart: regular rate and rhythm without murmurs or gallops.  Lungs:lungs are clear to auscultation bilaterally  Abdomen: soft, non-distended, non-tender  Neuro: Alert and oriented x 3  Extremities: Legs without edema bilaterally    Impression:  Aphakia right eye        Risks, benefits and alternatives discussed with the patient.  [facility]  Department of Emergency Medicine   ED  Encounter Note  Admit Date/RoomTime: 2021  4:47 PM  ED Room:     NAME: Kanwal Villarreal  : 1950  MRN: [id number]     Chief Complaint:  Back Pain (chronic back pain, goes to pain clinic. Every month or two has a flare up.  )    History of Present Illness        Kanwal Villarreal is a 73 y.o. old male with a prior history of ongoing chronic back pain from a remote injury and prior spinal fusion of thoracic spine and lumbar spine, presents to the emergency department by private vehicle, for traumatic and post-operative chronic, aching right greater than left lower lumbar spine pain to the right thigh, for many years  prior to arrival.  There has been no recent injury as it relates to today's visit. Since onset the symptoms have been stable and is mild-to-moderate in severity. He has associated signs & symptoms of nothing additional.   He denies any bladder or bowel incontinence , new weakness, tingling or paresthesias, recent back injection, recent spinal surgery, recent spinal/chiropractic manipulation, history of IVDA, fever, abdominal pain, bladder incontinence, bowel incontinence, bladder retention, bladder urgency, bowel urgency, saddle paresthesias  or sacral numbness. The pain is aggraveated by any movement and relieved by nothing despite medication use and rest.  He is currently enrolled in an pain management program, and is prescribed percocet and lyrica. ROS   Pertinent positives and negatives are stated within HPI, all other systems reviewed and are negative. Past Medical History:  has a past medical history of Anxiety, Back pain, Diabetes mellitus ([address] Hyperlipidemia, and Hypertension. Surgical History:  has a past surgical history that includes back surgery; knee surgery; other surgical history (2014); Carpal tunnel release; and hip surgery (Right).     Social History:  reports that he quit   Planned course of action:  Secondary IOL  right eye    Deshawn Saleem MD            smoking about 7 years ago. He has a 20.00 pack-year smoking history. He has never used smokeless tobacco. He reports that he does not drink alcohol and does not use drugs. Family History: family history is not on file. Allergies: Vicodin [hydrocodone-acetaminophen]    Physical Exam   Oxygen Saturation Interpretation: Normal.        ED Triage Vitals   BP Temp Temp Source Pulse Resp SpO2 Height Weight   12/19/21 1643 12/19/21 1643 12/19/21 1643 12/19/21 1643 12/19/21 1643 12/19/21 1643 12/19/21 1700 12/19/21 1652   (!) 141/78 96.6 °F (35.9 °C) Temporal 82 16 96 % 5' 9\" (1.753 m) 279 lb (126.6 kg)         Constitutional:  Alert, development consistent with age. HEENT:  NC/NT. Airway patent. Neck:  Normal ROM. Supple. Respiratory:  Clear to auscultation and breath sounds equal.  CV:  Regular rate and rhythm, normal heart sounds, without pathological murmurs, ectopy, gallops, or rubs. GI:  Abdomen Soft, nontender, good bowel sounds. No firm or pulsatile mass. Back: lower lumbar spine right greater than left. Tenderness: Mild. Swelling: no.              Range of Motion: full range of motion. CVA Tenderness: No CVA tenderness. Straight leg raising:  Right positive. Skin:  no wounds, erythema, or swelling. Distal Function:              Motor deficit: none. Sensory deficit: none. Pulse deficit: none. Calf Tenderness:  No Bilateral.               Edema:  none Both lower extremity(s). Deep Tendon Reflexes (DTR's) to bilateral knee's and ankle's are normo-reflexive   Gait:  normal.  Integument:  Normal turgor. Warm, dry, without visible rash. Lymphatics: No lymphangitis or adenopathy noted. Neurological:  Oriented. Motor functions intact.     Lab / Imaging Results   (All laboratory and radiology results have been personally reviewed by myself)  Labs:  No results found for this visit on 12/19/21. Imaging: All Radiology results interpreted by Radiologist unless otherwise noted. No orders to display       ED Course / Medical Decision Making     Medications   ketorolac (TORADOL) injection 60 mg (60 mg IntraMUSCular Given 12/19/21 1715)   cyclobenzaprine (FLEXERIL) tablet 10 mg (10 mg Oral Given 12/19/21 1715)   HYDROmorphone (DILAUDID) injection 1 mg (1 mg IntraMUSCular Given 12/19/21 1716)          Consult(s):   None    Procedure(s):   none    Medical Decision Making/Counseling: At this time the patient is without objective evidence of an acute process requiring inpatient management. Chronic pain, no new injury. No abdominal or flank pain. No neurological deficits or red flag symptoms. No indication for imaging,  in pain management. Pain control given in ED, patient to follow up with PCP/Pain mangement   The emergency provider has spoken with the patient and discussed todays emergency visit, in addition to providing specific details for the plan of care and counseling regarding the diagnosis and prognosis. He was counseled on the role of the emergency department regarding prescribing medications for chronic conditions including Narcotic and other controlled substances. Questions are answered at this time and they are agreeable with the plan. Assessment      1. Acute exacerbation of chronic low back pain      Plan   Discharged home. Patient condition is good    New Medications     Discharge Medication List as of 12/19/2021  5:14 PM        Electronically signed by SHIRA Smith CNP   DD: 12/19/21  **This report was transcribed using voice recognition software. Every effort was made to ensure accuracy; however, inadvertent computerized transcription errors may be present.   END OF ED PROVIDER NOTE      SHIRA Barrett CNP  12/19/21 0555

## 2022-03-04 ENCOUNTER — HOSPITAL ENCOUNTER (EMERGENCY)
Age: 72
Discharge: HOME | End: 2022-03-04
Attending: EMERGENCY MEDICINE
Payer: MEDICARE

## 2022-03-04 VITALS
TEMPERATURE: 97.6 F | WEIGHT: 265 LBS | OXYGEN SATURATION: 98 % | SYSTOLIC BLOOD PRESSURE: 138 MMHG | HEIGHT: 69 IN | HEART RATE: 77 BPM | DIASTOLIC BLOOD PRESSURE: 82 MMHG | RESPIRATION RATE: 16 BRPM | BODY MASS INDEX: 39.25 KG/M2

## 2022-03-04 DIAGNOSIS — M54.50 ACUTE EXACERBATION OF CHRONIC LOW BACK PAIN: Primary | ICD-10-CM

## 2022-03-04 DIAGNOSIS — M62.838 SPASM OF MUSCLE: ICD-10-CM

## 2022-03-04 DIAGNOSIS — G89.29 ACUTE EXACERBATION OF CHRONIC LOW BACK PAIN: Primary | ICD-10-CM

## 2022-03-04 PROCEDURE — 6360000002 HC RX W HCPCS: Performed by: EMERGENCY MEDICINE

## 2022-03-04 PROCEDURE — 99283 EMERGENCY DEPT VISIT LOW MDM: CPT

## 2022-03-04 PROCEDURE — 6370000000 HC RX 637 (ALT 250 FOR IP): Performed by: EMERGENCY MEDICINE

## 2022-03-04 PROCEDURE — 96372 THER/PROPH/DIAG INJ SC/IM: CPT

## 2022-03-04 PROCEDURE — 96374 THER/PROPH/DIAG INJ IV PUSH: CPT

## 2022-03-04 RX ORDER — KETOROLAC TROMETHAMINE 30 MG/ML
30 INJECTION, SOLUTION INTRAMUSCULAR; INTRAVENOUS ONCE
Status: COMPLETED | OUTPATIENT
Start: 2022-03-04 | End: 2022-03-04

## 2022-03-04 RX ORDER — CYCLOBENZAPRINE HCL 10 MG
10 TABLET ORAL ONCE
Status: COMPLETED | OUTPATIENT
Start: 2022-03-04 | End: 2022-03-04

## 2022-03-04 RX ORDER — CYCLOBENZAPRINE HCL 10 MG
10 TABLET ORAL NIGHTLY PRN
Qty: 10 TABLET | Refills: 0 | Status: SHIPPED | OUTPATIENT
Start: 2022-03-04

## 2022-03-04 RX ADMIN — KETOROLAC TROMETHAMINE 30 MG: 30 INJECTION, SOLUTION INTRAMUSCULAR at 10:49

## 2022-03-04 RX ADMIN — HYDROMORPHONE HYDROCHLORIDE 1 MG: 1 INJECTION, SOLUTION INTRAMUSCULAR; INTRAVENOUS; SUBCUTANEOUS at 10:52

## 2022-03-04 RX ADMIN — CYCLOBENZAPRINE 10 MG: 10 TABLET, FILM COATED ORAL at 10:49

## 2022-03-04 ASSESSMENT — PAIN SCALES - GENERAL
PAINLEVEL_OUTOF10: 10
PAINLEVEL_OUTOF10: 10

## 2022-03-04 ASSESSMENT — PAIN DESCRIPTION - LOCATION
LOCATION: BACK
LOCATION: BACK

## 2022-03-04 ASSESSMENT — PAIN DESCRIPTION - DESCRIPTORS: DESCRIPTORS: SHARP

## 2022-03-04 ASSESSMENT — PAIN DESCRIPTION - ORIENTATION: ORIENTATION: LOWER

## 2022-03-04 ASSESSMENT — PAIN DESCRIPTION - PAIN TYPE
TYPE: CHRONIC PAIN
TYPE: ACUTE PAIN;CHRONIC PAIN

## 2022-03-04 ASSESSMENT — PAIN DESCRIPTION - ONSET: ONSET: ON-GOING

## 2022-03-04 ASSESSMENT — PAIN DESCRIPTION - PROGRESSION: CLINICAL_PROGRESSION: RAPIDLY WORSENING

## 2022-03-04 NOTE — ED PROVIDER NOTES
HPI:  3/4/22, Time: 10:34 AM BRANDY Bryan is a 73 y.o. male presenting to the ED for acute exacerbation of chronic low back pain, beginning a few days ago. Patient reports he has been overdoing it at home taking care of his special needs son as well as his wife who just got discharged after a 4-day hospital stay for stroke. He denies direct trauma, focal weakness, saddle paresthesias, urinary or stool incontinence or retention. He states that he has chronic right leg paresthesias and pain. States he has had 8 back surgeries in the past as well as a spinal stimulator implanted. He sees pain management at [facility] and did take his Percocet this morning. He reports he feels more of a muscle spasm and last time he was here he had an injection of Toradol, Dilaudid and it made him feel better. Medical record shows he also got oral Flexeril at that time. He is a diabetic. His wife is driving. The complaint has been persistent, moderate in severity, and worsened by movement. Patient denies fever/chills, sore throat, cough, congestion, chest pain, shortness of breath, edema, headache, visual disturbances, new focal paresthesias, focal weakness, abdominal pain, nausea, vomiting, diarrhea, constipation, dysuria, hematuria, trauma, neck pain, rash or other complaints. ROS:   A complete review of systems was performed and all pertinent positives and negatives are stated within HPI, all other systems reviewed and are negative.      --------------------------------------------- PAST HISTORY ---------------------------------------------  Past Medical History:  has a past medical history of Anxiety, Back pain, Diabetes mellitus ([address] Hyperlipidemia, and Hypertension. Past Surgical History:  has a past surgical history that includes back surgery; knee surgery; other surgical history (5/8/2014); Carpal tunnel release; and hip surgery (Right).     Social History:  reports that he quit smoking about 8 years ago. He has a 20.00 pack-year smoking history. He has never used smokeless tobacco. He reports that he does not drink alcohol and does not use drugs. Family History: family history is not on file. The patients home medications have been reviewed. Allergies: Vicodin [hydrocodone-acetaminophen]        ----------------------------------------PHYSICAL EXAM--------------------------------------  Constitutional:  Well developed, well nourished, obese, no acute distress, non-toxic appearance   Eyes:  PERRL, conjunctiva normal, EOMI  HENT:  Atraumatic, external ears normal, nose normal, oropharynx moist. Neck- normal range of motion, no nuchal rigidity   Respiratory:  No respiratory distress, normal breath sounds, no rales, no wheezing   Cardiovascular:  Normal rate, normal rhythm, no murmurs, no gallops, no rubs. Radial pulses 2+ bilaterally. Compartments are soft. He is warm and well perfused. Cap refill less than 3 seconds. GI:  Soft, nondistended, normal bowel sounds, nontender, no organomegaly, no mass, no rebound, no guarding   :  No costovertebral angle tenderness   Musculoskeletal:  No edema, no tenderness, no deformities. Back-no midline or paraspinal cervical or thoracic tenderness. No midline lumbar tenderness but diffuse bilateral paraspinal lumbar tenderness with associated muscle spasm. Negative straight leg raise test.  Integument:  Well hydrated, no visible rash. Adequate perfusion. Large midline lumbar surgical incision extending up to lower thoracic area that is well-healed. Spinal stimulator noted in left lower lumbar area that is nontender to palpation and without swelling or redness. Lymphatic:  No cervical lymphadenopathy noted   Neurologic:  Alert & oriented x 3, CN 2-12 normal, no focal deficits noted. DTRs 2+ bilateral patellar. Normal gait.     Psychiatric:  Speech and behavior appropriate       -------------------------------------------------- RESULTS -------------------------------------------------  I have personally reviewed all laboratory and imaging results for this patient. Results are listed below. LABS:  No results found for this visit on 03/04/22. RADIOLOGY:  Interpreted by Radiologist.  No orders to display       ------------------------- NURSING NOTES AND VITALS REVIEWED ---------------------------  The nursing notes within the ED encounter and vital signs as below have been reviewed by myself. BP (!) 142/79   Pulse 74   Temp 97.6 °F (36.4 °C) (Temporal)   Resp 16   Ht 5' 9\" (1.753 m)   Wt 265 lb (120.2 kg)   SpO2 98%   BMI 39.13 kg/m²   Oxygen Saturation Interpretation: Normal      The patients available past medical records and past encounters were reviewed. ------------------------------ ED COURSE/MEDICAL DECISION MAKING----------------------  Medications   HYDROmorphone (DILAUDID) injection 1 mg (has no administration in time range)   ketorolac (TORADOL) injection 30 mg (has no administration in time range)   cyclobenzaprine (FLEXERIL) tablet 10 mg (has no administration in time range)           Procedures:   none      Medical Decision Making:    IM toradol, IM dilaudid and PO flexeril in ER. No need for imaging at this time as there is no traumatic injury or change in his neurologic status. His pain is acute on chronic exacerbation and needs control of his symptoms. Will follow up with Dr. Ileana Chance at pain management/spine at [facility]. He will be given prescriptions for Flexeril. Patient was explicitly instructed on specific signs and symptoms on which to return to the emergency room for. Patient was instructed to return to the ER for any new or worsening symptoms. Additional discharge instructions were given verbally. All questions were answered. Patient is comfortable and agreeable with discharge plan. Patient in no acute distress and non-toxic in appearance.     He appears well and neurovascularly intact and ambulatory upon discharge. This patient's ED course included: a personal history and physicial eaxmination    This patient has remained hemodynamically stable during their ED course. Israel Dorsey DO, am the Primary Provider of Record    Counseling: The emergency provider has spoken with the patient and discussed todays results, in addition to providing specific details for the plan of care and counseling regarding the diagnosis and prognosis. Questions are answered at this time and they are agreeable with the plan.    --------------------------- IMPRESSION AND DISPOSITION ---------------------------------    IMPRESSION  1. Acute exacerbation of chronic low back pain    2.  Spasm of muscle        DISPOSITION  Disposition: Discharge to home  Patient condition is stable             Julio C Becerra DO  22 [phone]

## 2022-04-24 ENCOUNTER — HOSPITAL ENCOUNTER (EMERGENCY)
Age: 72
Discharge: HOME | End: 2022-04-24
Payer: MEDICARE

## 2022-04-24 VITALS
OXYGEN SATURATION: 94 % | HEART RATE: 75 BPM | HEIGHT: 69 IN | BODY MASS INDEX: 41.32 KG/M2 | DIASTOLIC BLOOD PRESSURE: 77 MMHG | WEIGHT: 279 LBS | RESPIRATION RATE: 16 BRPM | SYSTOLIC BLOOD PRESSURE: 122 MMHG | TEMPERATURE: 98.3 F

## 2022-04-24 DIAGNOSIS — M54.50 ACUTE EXACERBATION OF CHRONIC LOW BACK PAIN: Primary | ICD-10-CM

## 2022-04-24 DIAGNOSIS — G89.29 ACUTE EXACERBATION OF CHRONIC LOW BACK PAIN: Primary | ICD-10-CM

## 2022-04-24 DIAGNOSIS — M54.31 SCIATICA OF RIGHT SIDE: ICD-10-CM

## 2022-04-24 PROCEDURE — 99284 EMERGENCY DEPT VISIT MOD MDM: CPT

## 2022-04-24 PROCEDURE — 6360000002 HC RX W HCPCS: Performed by: PHYSICIAN ASSISTANT

## 2022-04-24 PROCEDURE — 96372 THER/PROPH/DIAG INJ SC/IM: CPT

## 2022-04-24 RX ORDER — ORPHENADRINE CITRATE 30 MG/ML
60 INJECTION INTRAMUSCULAR; INTRAVENOUS ONCE
Status: COMPLETED | OUTPATIENT
Start: 2022-04-24 | End: 2022-04-24

## 2022-04-24 RX ORDER — KETOROLAC TROMETHAMINE 30 MG/ML
30 INJECTION, SOLUTION INTRAMUSCULAR; INTRAVENOUS ONCE
Status: COMPLETED | OUTPATIENT
Start: 2022-04-24 | End: 2022-04-24

## 2022-04-24 RX ADMIN — ORPHENADRINE CITRATE 60 MG: 30 INJECTION INTRAMUSCULAR; INTRAVENOUS at 18:47

## 2022-04-24 RX ADMIN — KETOROLAC TROMETHAMINE 30 MG: 30 INJECTION, SOLUTION INTRAMUSCULAR at 18:46

## 2022-04-24 ASSESSMENT — PAIN DESCRIPTION - PAIN TYPE
TYPE: ACUTE PAIN
TYPE: ACUTE PAIN

## 2022-04-24 ASSESSMENT — PAIN SCALES - GENERAL
PAINLEVEL_OUTOF10: 0
PAINLEVEL_OUTOF10: 10
PAINLEVEL_OUTOF10: 6
PAINLEVEL_OUTOF10: 10

## 2022-04-24 ASSESSMENT — PAIN DESCRIPTION - LOCATION
LOCATION: BACK

## 2022-04-24 ASSESSMENT — PAIN - FUNCTIONAL ASSESSMENT
PAIN_FUNCTIONAL_ASSESSMENT: 0-10
PAIN_FUNCTIONAL_ASSESSMENT: 0-10

## 2022-04-24 ASSESSMENT — PAIN DESCRIPTION - ORIENTATION
ORIENTATION: RIGHT
ORIENTATION: RIGHT

## 2022-04-24 ASSESSMENT — PAIN DESCRIPTION - FREQUENCY
FREQUENCY: CONTINUOUS
FREQUENCY: CONTINUOUS

## 2022-04-24 ASSESSMENT — LIFESTYLE VARIABLES: HOW OFTEN DO YOU HAVE A DRINK CONTAINING ALCOHOL: NEVER

## 2022-04-24 ASSESSMENT — PAIN DESCRIPTION - DESCRIPTORS
DESCRIPTORS: SHARP;PINS AND NEEDLES;ACHING
DESCRIPTORS: SHARP;PINS AND NEEDLES;ACHING

## 2022-04-24 NOTE — ED PROVIDER NOTES
[address]  Department of Emergency Medicine   ED  Encounter Note  Admit Date/RoomTime: 2022  6:02 PM  ED Room:   NAME: Ellis Maldonado  : 1950  MRN: [id number]     Chief Complaint:  Back Pain (rt side back pain radiates down rt leg, took percocet without relief)    HISTORY OF PRESENT ILLNESS        Ellis Maldonado is a 73 y.o. male who presents to the ED with a complaint of right-sided leg sciatica. Patient does have a history of chronic back pain with history of back surgery and nerve stimulator. Patient does admit he attends pain management and is on Percocet. In addition he is on Lyrica and muscle relaxer. Patient states he just had a steroid injection in his back 10 days ago. Who presents with a complaint of right-sided low back pain with pain through his buttock and down his leg. This is his typical right leg sciatica pain. Patient denies any injury. He denies any recent illness. Denies urine incontinence or retention. Denies saddle anesthesia. Denies bowel incontinence or retention. Patient does admit he has been on oral steroids in the past and it jacked his sugars up significantly. Patient is only on oral metformin, not insulin. Patient rates the pain a 10 out of 10. States he has taken his Percocet today. In addition he took a Flexeril 10 mg and his Lyrica. States none of this medication is helping today. Patient does readily state when he comes to the ER for his sciatica he gets a shot of Dilaudid. ROS   Pertinent positives and negatives are stated within HPI, all other systems reviewed and are negative. Past Medical History:  has a past medical history of Anxiety, Back pain, Diabetes mellitus ([facility] [address] Hyperlipidemia, and Hypertension. Surgical History:  has a past surgical history that includes back surgery; knee surgery; other surgical history (2014); Carpal tunnel release; and hip surgery (Right).     Social History: reports that he quit smoking about 8 years ago. He has a 20.00 pack-year smoking history. He has never used smokeless tobacco. He reports that he does not drink alcohol and does not use drugs. Family History: family history is not on file. Allergies: Vicodin [hydrocodone-acetaminophen]    PHYSICAL EXAM   Oxygen Saturation Interpretation: Normal on room air analysis. ED Triage Vitals   BP Temp Temp Source Pulse Resp SpO2 Height Weight   04/24/22 1752 04/24/22 1752 04/24/22 1752 04/24/22 1752 04/24/22 1752 04/24/22 1752 04/24/22 1823 04/24/22 1823   119/67 98.3 °F (36.8 °C) Oral 79 20 95 % 5' 9\" (1.753 m) 279 lb (126.6 kg)       General:  NAD. Alert and Oriented. Well-appearing. Skin:  Warm, dry. No rashes. Head:  Normocephalic. Atraumatic. Eyes:  EOMI. Conjunctiva normal.  ENT:  Oral mucosa moist.  Airway patent. Neck:  Supple. Normal ROM. Respiratory:  No respiratory distress. No labored breathing. Lungs clear without rales, rhonchi or wheezing. Cardiovascular:  Regular rate. No peripheral edema. Extremities warm and good color. Extremities:  Normal ROM. Nontender to palpation. Atraumatic. Back: Right-sided low back pain with palpation and tenderness around the right sciatica. Straight leg raise is negative bilateral.  He does have good sensation to light touch down both his legs. I did watch patient ambulate from triage and he was not limping or dragging his right leg. Neuro:  Alert and Oriented to person, place, time and situation. Normal LOC. Moves all extremities. Speech fluent. Psych:  Calm and Cooperative. Normal thought process. Normal judgement. Lab / Imaging Results   (All laboratory and radiology results have been personally reviewed by myself)  Labs:  No results found for this visit on 04/24/22. Imaging: All Radiology results interpreted by Radiologist unless otherwise noted.   No orders to display       ED Course / Medical Decision Making Medications   ketorolac (TORADOL) injection 30 mg (30 mg IntraMUSCular Given 4/24/22 1846)   orphenadrine (NORFLEX) injection 60 mg (60 mg IntraMUSCular Given 4/24/22 1847)        Re-examination:  4/24/22       Time: 1940  Patients condition is improving after treatment. Patient states his pain was over 10 in the door. He now rates it a 6 out of 10. Does state the burning is getting much better. In addition he does have another Percocet at home that he would be due for this evening and will take as needed. Consult(s):   None    Procedure(s):   none    MDM:   Patient treated with Toradol and Norflex. He did get good relief from just these medications. Plan of Care/Counseling:  Physician Assistant on duty reviewed today's visit with the patient in addition to providing specific details for the plan of care and counseling regarding the diagnosis and prognosis. Questions are answered at this time and are agreeable with the plan. ASSESSMENT     1. Acute exacerbation of chronic low back pain    2. Sciatica of right side      PLAN   Discharged home. Patient condition is good    New Medications     New Prescriptions    No medications on file     Electronically signed by JUWAN Montano   DD: 4/24/22  **This report was transcribed using voice recognition software. Every effort was made to ensure accuracy; however, inadvertent computerized transcription errors may be present.   END OF ED PROVIDER NOTE       Jaron Montano  04/24/22 1941

## 2022-05-10 ENCOUNTER — APPOINTMENT (OUTPATIENT)
Dept: GENERAL RADIOLOGY | Age: 72
End: 2022-05-10
Payer: MEDICARE

## 2022-05-10 ENCOUNTER — HOSPITAL ENCOUNTER (EMERGENCY)
Age: 72
Discharge: HOME | End: 2022-05-10
Payer: MEDICARE

## 2022-05-10 VITALS
WEIGHT: 265 LBS | BODY MASS INDEX: 39.13 KG/M2 | RESPIRATION RATE: 16 BRPM | OXYGEN SATURATION: 98 % | TEMPERATURE: 98.1 F | HEART RATE: 84 BPM | DIASTOLIC BLOOD PRESSURE: 74 MMHG | SYSTOLIC BLOOD PRESSURE: 132 MMHG

## 2022-05-10 DIAGNOSIS — S61.452A DOG BITE OF LEFT HAND, INITIAL ENCOUNTER: Primary | ICD-10-CM
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
## 2022-05-10 PROCEDURE — 6360000002 HC RX W HCPCS: Performed by: NURSE PRACTITIONER

## 2022-05-10 PROCEDURE — 99284 EMERGENCY DEPT VISIT MOD MDM: CPT

## 2022-05-10 PROCEDURE — 6370000000 HC RX 637 (ALT 250 FOR IP): Performed by: NURSE PRACTITIONER

## 2022-05-10 PROCEDURE — 73130 X-RAY EXAM OF HAND: CPT

## 2022-05-10 PROCEDURE — 90714 TD VACC NO PRESV 7 YRS+ IM: CPT | Performed by: NURSE PRACTITIONER

## 2022-05-10 PROCEDURE — 90471 IMMUNIZATION ADMIN: CPT | Performed by: NURSE PRACTITIONER

## 2022-05-10 RX ORDER — DIAPER,BRIEF,INFANT-TODD,DISP
EACH MISCELLANEOUS ONCE
Status: COMPLETED | OUTPATIENT
Start: 2022-05-10 | End: 2022-05-10

## 2022-05-10 RX ORDER — TETANUS AND DIPHTHERIA TOXOIDS ADSORBED 2; 2 [LF]/.5ML; [LF]/.5ML
0.5 INJECTION INTRAMUSCULAR ONCE
Status: COMPLETED | OUTPATIENT
Start: 2022-05-10 | End: 2022-05-10

## 2022-05-10 RX ORDER — AMOXICILLIN AND CLAVULANATE POTASSIUM 875; 125 MG/1; MG/1
1 TABLET, FILM COATED ORAL ONCE
Status: COMPLETED | OUTPATIENT
Start: 2022-05-10 | End: 2022-05-10

## 2022-05-10 RX ORDER — AMOXICILLIN AND CLAVULANATE POTASSIUM 875; 125 MG/1; MG/1
1 TABLET, FILM COATED ORAL 2 TIMES DAILY
Qty: 20 TABLET | Refills: 0 | Status: SHIPPED | OUTPATIENT
Start: 2022-05-10 | End: 2022-05-20

## 2022-05-10 RX ORDER — BACITRACIN ZINC AND POLYMYXIN B SULFATE 500; 1000 [USP'U]/G; [USP'U]/G
OINTMENT TOPICAL
Qty: 30 G | Refills: 0 | Status: SHIPPED | OUTPATIENT
Start: 2022-05-10 | End: 2022-05-17

## 2022-05-10 RX ADMIN — TETANUS AND DIPHTHERIA TOXOIDS ADSORBED 0.5 ML: 2; 2 INJECTION INTRAMUSCULAR at 18:00

## 2022-05-10 RX ADMIN — AMOXICILLIN AND CLAVULANATE POTASSIUM 1 TABLET: 875; 125 TABLET, FILM COATED ORAL at 17:59

## 2022-05-10 RX ADMIN — BACITRACIN ZINC: 500 OINTMENT TOPICAL at 18:00

## 2022-05-10 ASSESSMENT — PAIN DESCRIPTION - LOCATION: LOCATION: HAND

## 2022-05-10 ASSESSMENT — PAIN DESCRIPTION - ONSET: ONSET: ON-GOING

## 2022-05-10 ASSESSMENT — PAIN DESCRIPTION - DESCRIPTORS: DESCRIPTORS: ACHING

## 2022-05-10 ASSESSMENT — PAIN DESCRIPTION - FREQUENCY: FREQUENCY: CONTINUOUS

## 2022-05-10 ASSESSMENT — PAIN DESCRIPTION - ORIENTATION: ORIENTATION: LEFT

## 2022-05-10 ASSESSMENT — PAIN - FUNCTIONAL ASSESSMENT: PAIN_FUNCTIONAL_ASSESSMENT: 0-10

## 2022-05-10 ASSESSMENT — PAIN SCALES - GENERAL: PAINLEVEL_OUTOF10: 2

## 2022-05-10 NOTE — ED PROVIDER NOTES
[facility]      HPI:  5/10/22,   Time: 8:11 PM EDT         Jonh Mayer is a 73 y.o. male presenting to the ED for dog bite. Patient states that he was accidentally bitten by his 5month-old puppy on the left hand. Patient states that this happened several hours prior to arrival.  Patient states that his last tetanus vaccine is unknown he denies any numbness tingling or weakness to the area states that he did wash the area with peroxide soap and water prior to coming to the emergency department. Patient states that he has full range of motion to all of his fingers and hands. Patient denies any bleeding at this time. He states that he did place a Band-Aid on the area which did help his symptoms. ROS:   Pertinent positives and negatives are stated within HPI, all other systems reviewed and are negative.  --------------------------------------------- PAST HISTORY ---------------------------------------------  Past Medical History:  has a past medical history of Anxiety, Back pain, Diabetes mellitus ([address] Hyperlipidemia, and Hypertension. Past Surgical History:  has a past surgical history that includes back surgery; knee surgery; other surgical history (5/8/2014); Carpal tunnel release; and hip surgery (Right). Social History:  reports that he quit smoking about 8 years ago. He has a 20.00 pack-year smoking history. He has never used smokeless tobacco. He reports that he does not drink alcohol and does not use drugs. Family History: family history is not on file. The patients home medications have been reviewed. Allergies: Vicodin [hydrocodone-acetaminophen]    -------------------------------------------------- RESULTS -------------------------------------------------  All laboratory and radiology results have been personally reviewed by myself   LABS:  No results found for this visit on 05/10/22.     RADIOLOGY:  Interpreted by Radiologist.  XR HAND LEFT (MIN 3 VIEWS)   Final Result Metallic foreign body lateral soft tissues left 2nd digit at the level of the   MCP joint. Clinical correlation is required. Mild osteoarthritis left hand.             ------------------------- NURSING NOTES AND VITALS REVIEWED ---------------------------   The nursing notes within the ED encounter and vital signs as below have been reviewed. /74   Pulse 84   Temp 98.1 °F (36.7 °C) (Temporal)   Resp 16   Wt 265 lb (120.2 kg)   SpO2 98%   BMI 39.13 kg/m²   Oxygen Saturation Interpretation: Normal      ---------------------------------------------------PHYSICAL EXAM--------------------------------------      Constitutional/General: Alert and oriented x3, well appearing, non toxic in NAD  Head: NC/AT  Eyes: PERRL, EOMI  Mouth: Oropharynx clear, handling secretions, no trismus  Neck: Supple, full ROM, no meningeal signs  Pulmonary: Lungs clear to auscultation bilaterally, no wheezes, rales, or rhonchi. Not in respiratory distress  Cardiovascular:  Regular rate and rhythm, no murmurs, gallops, or rubs. 2+ distal pulses  Abdomen: Soft, non tender, non distended,   Extremities: Moves all extremities x 4. Warm and well perfused  Skin: warm and dry without rash. 1 cm abrasion to the left thumb volar aspect proximal phalanx, eating is controlled there is no involvement of tendon or muscle there is no foreign body visualized or palpated. Neurologic: GCS 15,  Psych: Normal Affect      ------------------------------ ED COURSE/MEDICAL DECISION MAKING----------------------  Medications   diptheria-tetanus toxoids (DECAVAC) 2-2 LF/0.5ML injection 0.5 mL (0.5 mLs IntraMUSCular Given 5/10/22 1800)   bacitracin zinc ointment ( Topical Given 5/10/22 1800)   amoxicillin-clavulanate (AUGMENTIN) 875-125 MG per tablet 1 tablet (1 tablet Oral Given 5/10/22 1759)         Medical Decision Making:     At this time the patient is without objective evidence of an acute process requiring hospitalization or inpatient management. They have remained hemodynamically stable throughout their entire ED visit and are stable for discharge with outpatient follow-up. The plan has been discussed in detail and they are aware of the specific conditions for emergent return, as well as the importance of follow-up. Patient's wound was cleansed with sterile water and bacitracin ointment and a dry sterile dressing was applied. Patient states that he does have a known foreign body in his left hand due to working in a [address] [place] [address] Ext and being a  he states that the foreign body has been in his left hand since 1971. Patient at this time was placed on Augmentin for the dog bite bacitracin ointment. Patient states they will take Tylenol ibuprofen over-the-counter. Patient's symptoms have improved with treatment and is nontoxic in appearance and in no distress. Counseling: The emergency provider has spoken with the patient and discussed todays results, in addition to providing specific details for the plan of care and counseling regarding the diagnosis and prognosis. Questions are answered at this time and they are agreeable with the plan.      --------------------------------- IMPRESSION AND DISPOSITION ---------------------------------    IMPRESSION  1.  Dog bite of left hand, initial encounter        DISPOSITION  Disposition: Discharge to home  Patient condition is good                 SHIRA Flynn CNP  05/10/22 2014

## 2022-06-02 ENCOUNTER — HOSPITAL ENCOUNTER (EMERGENCY)
Age: 72
Discharge: HOME | End: 2022-06-02
Attending: EMERGENCY MEDICINE
Payer: MEDICARE

## 2022-06-02 VITALS
RESPIRATION RATE: 18 BRPM | WEIGHT: 265 LBS | HEART RATE: 75 BPM | TEMPERATURE: 97.3 F | SYSTOLIC BLOOD PRESSURE: 119 MMHG | HEIGHT: 69 IN | OXYGEN SATURATION: 94 % | DIASTOLIC BLOOD PRESSURE: 59 MMHG | BODY MASS INDEX: 39.25 KG/M2

## 2022-06-02 DIAGNOSIS — M54.32 SCIATICA OF LEFT SIDE: Primary | ICD-10-CM

## 2022-06-02 PROCEDURE — 99284 EMERGENCY DEPT VISIT MOD MDM: CPT

## 2022-06-02 PROCEDURE — 6360000002 HC RX W HCPCS: Performed by: EMERGENCY MEDICINE

## 2022-06-02 PROCEDURE — 96372 THER/PROPH/DIAG INJ SC/IM: CPT

## 2022-06-02 RX ORDER — ORPHENADRINE CITRATE 30 MG/ML
60 INJECTION INTRAMUSCULAR; INTRAVENOUS ONCE
Status: COMPLETED | OUTPATIENT
Start: 2022-06-02 | End: 2022-06-02

## 2022-06-02 RX ORDER — KETOROLAC TROMETHAMINE 30 MG/ML
30 INJECTION, SOLUTION INTRAMUSCULAR; INTRAVENOUS ONCE
Status: COMPLETED | OUTPATIENT
Start: 2022-06-02 | End: 2022-06-02

## 2022-06-02 RX ADMIN — KETOROLAC TROMETHAMINE 30 MG: 30 INJECTION, SOLUTION INTRAMUSCULAR at 22:38

## 2022-06-02 RX ADMIN — ORPHENADRINE CITRATE 60 MG: 30 INJECTION INTRAMUSCULAR; INTRAVENOUS at 22:39

## 2022-06-02 ASSESSMENT — PAIN SCALES - GENERAL
PAINLEVEL_OUTOF10: 10

## 2022-06-02 ASSESSMENT — PAIN DESCRIPTION - FREQUENCY: FREQUENCY: CONTINUOUS

## 2022-06-02 ASSESSMENT — PAIN DESCRIPTION - LOCATION
LOCATION: BACK
LOCATION: BACK;LEG
LOCATION: BACK

## 2022-06-02 ASSESSMENT — PAIN - FUNCTIONAL ASSESSMENT
PAIN_FUNCTIONAL_ASSESSMENT: 0-10
PAIN_FUNCTIONAL_ASSESSMENT: PREVENTS OR INTERFERES SOME ACTIVE ACTIVITIES AND ADLS

## 2022-06-02 ASSESSMENT — PAIN DESCRIPTION - DESCRIPTORS: DESCRIPTORS: ACHING

## 2022-06-02 ASSESSMENT — PAIN DESCRIPTION - ONSET: ONSET: PROGRESSIVE

## 2022-06-02 ASSESSMENT — PAIN DESCRIPTION - PAIN TYPE: TYPE: ACUTE PAIN

## 2022-06-02 ASSESSMENT — PAIN DESCRIPTION - ORIENTATION: ORIENTATION: RIGHT;LEFT

## 2022-06-04 NOTE — ED PROVIDER NOTES
Chief complaint: Back pain    HPI:  6/4/22,   Time: 3:37 AM EDT         Baljinder Mcdonald is a 73 y.o. male presenting to the ED for back pain. The patient is present emergency department for chief complaint of back pain. The patient states that he does have a history of chronic back issues. He feels he is having a flare of his sciatica. He does have pain radiating down his left leg. Described as dull aching. Moderate in severity. Worse with movement. Nothing makes it better. Has not tried any treatments prior to arrival.  He denies any bowel or bladder incontinence, saddle anesthesias weakness of the bilateral lower extremities. He states this does feel like an exacerbation of his back pain. No fevers or history of IV drug abuse. There are no associated fevers, chills, lightheadedness, nasal congestion, chest pain, shortness of breath, cough, nausea, vomiting, abdominal pain, diarrhea, constipation, dysuria or hematuria. The patient has no other stated complaints at this time. ROS:   Pertinent positives and negatives are stated within HPI, all other systems reviewed and are negative.  --------------------------------------------- PAST HISTORY ---------------------------------------------  Past Medical History:  has a past medical history of Anxiety, Back pain, Diabetes mellitus ([address] Hyperlipidemia, and Hypertension. Past Surgical History:  has a past surgical history that includes back surgery; knee surgery; other surgical history (5/8/2014); Carpal tunnel release; and hip surgery (Right). Social History:  reports that he quit smoking about 8 years ago. He has a 20.00 pack-year smoking history. He has never used smokeless tobacco. He reports that he does not drink alcohol and does not use drugs. Family History: family history is not on file. The patients home medications have been reviewed.     Allergies: Vicodin [hydrocodone-acetaminophen]    -------------------------------------------------- RESULTS -------------------------------------------------  All laboratory and radiology results have been personally reviewed by myself   LABS:  No results found for this visit on 06/02/22. RADIOLOGY:  Interpreted by Radiologist.  No orders to display       ------------------------- NURSING NOTES AND VITALS REVIEWED ---------------------------   The nursing notes within the ED encounter and vital signs as below have been reviewed. BP (!) 119/59   Pulse 75   Temp 97.3 °F (36.3 °C) (Temporal)   Resp 18   Ht 5' 9\" (1.753 m)   Wt 265 lb (120.2 kg)   SpO2 94%   BMI 39.13 kg/m²   Oxygen Saturation Interpretation: Normal      ---------------------------------------------------PHYSICAL EXAM--------------------------------------      Constitutional/General: Alert and oriented x3, well appearing, non toxic in NAD  Head: NC/AT  Mouth: Oropharynx clear, handling secretions, no trismus  Neck: Supple, full ROM, no meningeal signs  Pulmonary: Lungs clear to auscultation bilaterally, no wheezes, rales, or rhonchi. Not in respiratory distress  Cardiovascular:  Regular rate and rhythm, no murmurs, gallops, or rubs. 2+ distal pulses  Abdomen: Soft, non tender, non distended,   Musculoskeletal: Moves all extremities x 4. Warm and well perfused, there is mild tenderness to palpation in the lower lumbar spine, no erythema, warmth to touch or cellulitic changes. No step-offs or deformities.   Skin: warm and dry without rash  Neurologic: GCS 15, 5 out of 5 muscle strength bilateral upper and lower extremities  Psych: Normal Affect      ------------------------------ ED COURSE/MEDICAL DECISION MAKING----------------------  Medications   ketorolac (TORADOL) injection 30 mg (30 mg IntraMUSCular Given 6/2/22 2238)   orphenadrine (NORFLEX) injection 60 mg (60 mg IntraMUSCular Given 6/2/22 2239)         Medical Decision Making:    Pt presents for back pain.  The patient with no clinical concern for cauda equina. He does have known history of sciatica. He states he has been here before and got Toradol and Norflex which significantly helped him. Patient will be discharged home and follow-up outpatient. Counseling: The emergency provider has spoken with the patient and discussed todays results, in addition to providing specific details for the plan of care and counseling regarding the diagnosis and prognosis. Questions are answered at this time and they are agreeable with the plan.      --------------------------------- IMPRESSION AND DISPOSITION ---------------------------------    IMPRESSION  1.  Sciatica of left side        DISPOSITION  Disposition: Discharge to home  Patient condition is stable                 Tracy Rosales DO  06/04/22 [id number]

## 2022-08-22 ENCOUNTER — HOSPITAL ENCOUNTER (EMERGENCY)
Age: 72
Discharge: HOME | End: 2022-08-22
Attending: EMERGENCY MEDICINE
Payer: MEDICARE

## 2022-08-22 VITALS
DIASTOLIC BLOOD PRESSURE: 65 MMHG | HEART RATE: 71 BPM | SYSTOLIC BLOOD PRESSURE: 106 MMHG | WEIGHT: 260 LBS | RESPIRATION RATE: 20 BRPM | TEMPERATURE: 98.6 F | OXYGEN SATURATION: 94 % | BODY MASS INDEX: 38.51 KG/M2 | HEIGHT: 69 IN

## 2022-08-22 DIAGNOSIS — G89.29 ACUTE EXACERBATION OF CHRONIC LOW BACK PAIN: Primary | ICD-10-CM

## 2022-08-22 DIAGNOSIS — M54.31 SCIATICA OF RIGHT SIDE: ICD-10-CM

## 2022-08-22 DIAGNOSIS — M54.50 ACUTE EXACERBATION OF CHRONIC LOW BACK PAIN: Primary | ICD-10-CM

## 2022-08-22 PROCEDURE — 99284 EMERGENCY DEPT VISIT MOD MDM: CPT

## 2022-08-22 PROCEDURE — 96372 THER/PROPH/DIAG INJ SC/IM: CPT

## 2022-08-22 PROCEDURE — 6360000002 HC RX W HCPCS: Performed by: EMERGENCY MEDICINE

## 2022-08-22 RX ORDER — KETOROLAC TROMETHAMINE 30 MG/ML
30 INJECTION, SOLUTION INTRAMUSCULAR; INTRAVENOUS ONCE
Status: COMPLETED | OUTPATIENT
Start: 2022-08-22 | End: 2022-08-22

## 2022-08-22 RX ORDER — ORPHENADRINE CITRATE 30 MG/ML
60 INJECTION INTRAMUSCULAR; INTRAVENOUS ONCE
Status: COMPLETED | OUTPATIENT
Start: 2022-08-22 | End: 2022-08-22

## 2022-08-22 RX ORDER — ORPHENADRINE CITRATE 100 MG/1
100 TABLET, EXTENDED RELEASE ORAL 2 TIMES DAILY PRN
Qty: 20 TABLET | Refills: 0 | Status: SHIPPED | OUTPATIENT
Start: 2022-08-22 | End: 2022-09-01

## 2022-08-22 RX ADMIN — KETOROLAC TROMETHAMINE 30 MG: 30 INJECTION, SOLUTION INTRAMUSCULAR at 06:27

## 2022-08-22 RX ADMIN — ORPHENADRINE CITRATE 60 MG: 30 INJECTION INTRAMUSCULAR; INTRAVENOUS at 06:27

## 2022-08-22 ASSESSMENT — PAIN SCALES - GENERAL
PAINLEVEL_OUTOF10: 4
PAINLEVEL_OUTOF10: 6
PAINLEVEL_OUTOF10: 9
PAINLEVEL_OUTOF10: 8

## 2022-08-22 ASSESSMENT — PAIN DESCRIPTION - LOCATION
LOCATION: BACK
LOCATION: BACK

## 2022-08-22 ASSESSMENT — PAIN DESCRIPTION - DESCRIPTORS: DESCRIPTORS: ACHING;THROBBING;TIGHTNESS

## 2022-08-22 ASSESSMENT — PAIN DESCRIPTION - ORIENTATION
ORIENTATION: LOWER
ORIENTATION: LOWER

## 2022-08-22 ASSESSMENT — PAIN DESCRIPTION - FREQUENCY: FREQUENCY: CONTINUOUS

## 2022-08-22 ASSESSMENT — PAIN DESCRIPTION - ONSET: ONSET: AWAKENED FROM SLEEP

## 2022-08-22 ASSESSMENT — PAIN DESCRIPTION - PAIN TYPE: TYPE: CHRONIC PAIN

## 2022-08-22 ASSESSMENT — PAIN - FUNCTIONAL ASSESSMENT: PAIN_FUNCTIONAL_ASSESSMENT: 0-10

## 2022-08-22 NOTE — ED PROVIDER NOTES
[address] [facility]  Department of Emergency Medicine   ED  Encounter Note  Admit Date/RoomTime: 2022  5:43 AM  ED Room:     NAME: Francheska Carvalho  : 1950  MRN: [id number]     Chief Complaint:  Back Pain (Chronic lower back pain. States his sciatica is acting up and cannot get the muscle to relax. )    History of Present Illness        Francheska Carvalho is a 81 y.o. old male with a prior history of chronic back pain and with multiple previous back surgeries last one in 2018, presents to the emergency department by private vehicle, for non-traumatic acute-on-chronic, aching and cramping right lower lumbar spine and sacral spine pain radiation to the right great toe, for several hour(s) prior to arrival.  There has been no recent injury as it relates to today's visit. Since onset the symptoms have been persistent and is moderate in severity. He has associated signs & symptoms of nothing additional.   He denies any bladder or bowel incontinence , new weakness, tingling or paresthesias, recent back injection, recent spinal surgery, recent spinal/chiropractic manipulation, history of IVDA, fever, abdominal pain, bladder incontinence, bowel incontinence, bladder retention, or saddle paresthesias . The pain is aggraveated by nothing in particular and relieved by nothing despite medication use and rest.  He is currently enrolled in an pain management program.    .  ROS   Pertinent positives and negatives are stated within HPI, all other systems reviewed and are negative. Past Medical History:  has a past medical history of Anxiety, Back pain, Diabetes mellitus ([address] Hyperlipidemia, and Hypertension. Surgical History:  has a past surgical history that includes back surgery; knee surgery; other surgical history (2014); Carpal tunnel release; and hip surgery (Right). Social History:  reports that he quit smoking about 8 years ago. He has a 20.00 pack-year smoking history.  He has never Given 8/22/22 0627)   orphenadrine (NORFLEX) injection 60 mg (60 mg IntraMUSCular Given 8/22/22 1999)        Re-examination:  8/22/22          Patients condition is improving after treatment. Consult(s):   None    Procedure(s):   None    Medical Decision Making/Counseling:    *At this time the patient is without objective evidence of an acute process requiring inpatient management. The emergency provider has spoken with the patient and discussed todays emergency visit, in addition to providing specific details for the plan of care and counseling regarding the diagnosis and prognosis. He was counseled on the role of the emergency department regarding prescribing medications for chronic conditions including Narcotic and other controlled substances. Based on the presenting complaint and nature of illness, a muscle relaxer will be prescribed to the patient, he was advised to call his pain management doctor prior to filling it. Provided today in prescription form and he is instructed to contact their provider for supplemental medications as soon as possible. Questions are answered at this time and they are agreeable with the plan. Assessment      1. Acute exacerbation of chronic low back pain    2. Sciatica of right side      Plan   Discharged home. Patient condition is stable    New Medications     New Prescriptions    ORPHENADRINE (NORFLEX) 100 MG EXTENDED RELEASE TABLET    Take 1 tablet by mouth 2 times daily as needed for Muscle spasms     Electronically signed by Shiraz Marsh DO   DD: 8/22/22  **This report was transcribed using voice recognition software. Every effort was made to ensure accuracy; however, inadvertent computerized transcription errors may be present.   END OF ED PROVIDER NOTE       Shiraz Marsh DO  08/22/22 5445

## 2022-09-11 ENCOUNTER — HOSPITAL ENCOUNTER (EMERGENCY)
Age: 72
Discharge: HOME | End: 2022-09-11
Payer: MEDICARE

## 2022-09-11 VITALS
HEIGHT: 69 IN | OXYGEN SATURATION: 91 % | BODY MASS INDEX: 37.77 KG/M2 | HEART RATE: 60 BPM | SYSTOLIC BLOOD PRESSURE: 160 MMHG | DIASTOLIC BLOOD PRESSURE: 60 MMHG | RESPIRATION RATE: 16 BRPM | WEIGHT: 255 LBS | TEMPERATURE: 97.7 F

## 2022-09-11 DIAGNOSIS — G89.29 ACUTE EXACERBATION OF CHRONIC LOW BACK PAIN: Primary | ICD-10-CM

## 2022-09-11 DIAGNOSIS — M54.50 ACUTE EXACERBATION OF CHRONIC LOW BACK PAIN: Primary | ICD-10-CM

## 2022-09-11 PROCEDURE — 96372 THER/PROPH/DIAG INJ SC/IM: CPT

## 2022-09-11 PROCEDURE — 6360000002 HC RX W HCPCS: Performed by: PHYSICIAN ASSISTANT

## 2022-09-11 PROCEDURE — 99284 EMERGENCY DEPT VISIT MOD MDM: CPT

## 2022-09-11 RX ORDER — DEXAMETHASONE SODIUM PHOSPHATE 10 MG/ML
8 INJECTION, SOLUTION INTRAMUSCULAR; INTRAVENOUS ONCE
Status: COMPLETED | OUTPATIENT
Start: 2022-09-11 | End: 2022-09-11

## 2022-09-11 RX ORDER — KETOROLAC TROMETHAMINE 30 MG/ML
30 INJECTION, SOLUTION INTRAMUSCULAR; INTRAVENOUS ONCE
Status: COMPLETED | OUTPATIENT
Start: 2022-09-11 | End: 2022-09-11

## 2022-09-11 RX ADMIN — DEXAMETHASONE SODIUM PHOSPHATE 8 MG: 10 INJECTION, SOLUTION INTRAMUSCULAR; INTRAVENOUS at 14:37

## 2022-09-11 RX ADMIN — HYDROMORPHONE HYDROCHLORIDE 0.5 MG: 1 INJECTION, SOLUTION INTRAMUSCULAR; INTRAVENOUS; SUBCUTANEOUS at 14:36

## 2022-09-11 RX ADMIN — KETOROLAC TROMETHAMINE 30 MG: 30 INJECTION, SOLUTION INTRAMUSCULAR; INTRAVENOUS at 14:38

## 2022-09-11 ASSESSMENT — PAIN DESCRIPTION - LOCATION
LOCATION: BACK;BUTTOCKS
LOCATION: BACK;LEG
LOCATION: BACK;BUTTOCKS

## 2022-09-11 ASSESSMENT — PAIN DESCRIPTION - ONSET: ONSET: ON-GOING

## 2022-09-11 ASSESSMENT — PAIN DESCRIPTION - ORIENTATION: ORIENTATION: RIGHT

## 2022-09-11 ASSESSMENT — PAIN DESCRIPTION - FREQUENCY: FREQUENCY: CONTINUOUS

## 2022-09-11 ASSESSMENT — PAIN SCALES - GENERAL
PAINLEVEL_OUTOF10: 6
PAINLEVEL_OUTOF10: 10
PAINLEVEL_OUTOF10: 10
PAINLEVEL_OUTOF10: 9
PAINLEVEL_OUTOF10: 9

## 2022-09-11 ASSESSMENT — PAIN - FUNCTIONAL ASSESSMENT: PAIN_FUNCTIONAL_ASSESSMENT: 0-10

## 2022-09-11 ASSESSMENT — PAIN DESCRIPTION - DESCRIPTORS: DESCRIPTORS: SHARP;STABBING

## 2022-09-11 ASSESSMENT — PAIN DESCRIPTION - PAIN TYPE: TYPE: CHRONIC PAIN

## 2022-09-11 NOTE — ED PROVIDER NOTES
[facility]  Department of Emergency Medicine   ED  Encounter Note  Admit Date/RoomTime: 2022  1:52 PM  ED Room:   NAME: Francheska Carvalho  : 1950  MRN: [id number]     Chief Complaint:  Back Pain (Pain in right lower back down into the leg. Pt is in pain management. Took percocet and flexeril at home around 1230pm.)    HISTORY OF PRESENT ILLNESS        Francheska Carvalho is a 81 y.o. male who presents to the ED with a complaint of back pain and right leg sciatica. Patient has a history of chronic back pain with multiple surgeries. Is in pain management. Patient complains of a flareup of his back pain that started this morning. It is on the right low back and into the right buttock and down the right leg. Patient is a diabetic. States his sugar was 107 this morning. Is on metformin. Patient denies any fevers or chills. Denies recent illness. Patient denies nausea, vomiting or diarrhea. Denies dysuria. Pain does not radiate into abdomen or chest.  Denies any history of renal failure. Denies a history of IV drug abuse. Denies a recent history of cancer or immunocompromised system. Patient is not on anticoagulants. Patient denies bladder incontinence or retention. Denies saddle anesthesia. Denies bowel incontinence or retention. ROS   Pertinent positives and negatives are stated within HPI, all other systems reviewed and are negative. Past Medical History:  has a past medical history of Anxiety, Back pain, Diabetes mellitus ([address] Hyperlipidemia, and Hypertension. Surgical History:  has a past surgical history that includes back surgery; knee surgery; other surgical history (2014); Carpal tunnel release; and hip surgery (Right). Social History:  reports that he quit smoking about 8 years ago. He has a 20.00 pack-year smoking history.  He has never used smokeless tobacco. He reports that he does not drink alcohol and does not use drugs. Family History: family history is not on file. Allergies: Vicodin [hydrocodone-acetaminophen]    PHYSICAL EXAM   Oxygen Saturation Interpretation: Normal on room air analysis. ED Triage Vitals   BP Temp Temp Source Heart Rate Resp SpO2 Height Weight   09/11/22 1343 09/11/22 1343 09/11/22 1343 09/11/22 1343 09/11/22 1343 09/11/22 1343 09/11/22 1348 09/11/22 1348   (!) 100/59 97.7 °F (36.5 °C) Temporal 85 16 91 % 5' 9\" (1.753 m) 255 lb (115.7 kg)         General:  NAD. Alert and Oriented. Well-appearing. Skin:  Warm, dry. No rashes. Head:  Normocephalic. Atraumatic. Eyes:  EOMI. Conjunctiva normal.  ENT:  Oral mucosa moist.  Airway patent. Neck:  Supple. Normal ROM. Respiratory:  No respiratory distress. No labored breathing. Lungs clear without rales, rhonchi or wheezing. Cardiovascular:  Regular rate. No Murmur. No peripheral edema. Extremities warm and good color. Extremities:  Normal ROM. Nontender to palpation. Atraumatic. Back:    Pt walked to ED room independently. POC is sitting in a chair. Pain on palpation to right low back and right buttock. Straight leg raise is positive on the right  Pt has decreased sensation to light touch to the right medial thigh when compared to the left thigh. Equal sensation to touch to the medial aspect of both feet. Pt can flex and extend at knees bilateral.  Pt can plantarflex and dorsiflex at ankles bilateral.  Pt can plantarflex and dorsiflex all toes of both feet. Negative clonus. Neuro:  Alert and Oriented to person, place, time and situation. Normal LOC. Moves all extremities. Speech fluent. Psych:  Calm and Cooperative. Normal thought process. Normal judgement. Lab / Imaging Results   (All laboratory and radiology results have been personally reviewed by myself)  Labs:  No results found for this visit on 09/11/22. Imaging: All Radiology results interpreted by Radiologist unless otherwise noted.   No orders to display       ED Course / Medical Decision Making     Medications   dexamethasone (PF) (DECADRON) injection 8 mg (8 mg IntraMUSCular Given 9/11/22 1437)   HYDROmorphone (DILAUDID) injection 0.5 mg (0.5 mg IntraMUSCular Given 9/11/22 1436)   ketorolac (TORADOL) injection 30 mg (30 mg IntraMUSCular Given 9/11/22 1438)        Re-examination:  9/11/22       Time: 1505 symptoms are improving. Patients condition . Consult(s):   None    Procedure(s):   None    MDM:   One-time IM dose of Dilaudid and Decadron here. He will check his sugars more frequently. He already has his regular pain management medications at home. Plan of Care/Counseling:   Physician assistant reviewed today's visit with the patient in addition to providing specific details for the plan of care and counseling regarding the diagnosis and prognosis. Questions are answered at this time and are agreeable with the plan. ASSESSMENT     1. Acute exacerbation of chronic low back pain      PLAN   Discharged home. Patient condition is good    New Medications     New Prescriptions    No medications on file     Electronically signed by JUWAN Vasquez   DD: 9/11/22  **This report was transcribed using voice recognition software. Every effort was made to ensure accuracy; however, inadvertent computerized transcription errors may be present.   END OF ED PROVIDER NOTE       Jaron Vasquez  09/11/22 0846

## 2022-09-11 NOTE — ED NOTES
PA notified of BP. States have patient drink fluids and recheck in a while. Gatorade given.      Freeman Tyson RN  09/11/22 [id number]

## 2022-11-01 ENCOUNTER — APPOINTMENT (OUTPATIENT)
Dept: GENERAL RADIOLOGY | Age: 72
End: 2022-11-01
Payer: MEDICARE

## 2022-11-01 ENCOUNTER — HOSPITAL ENCOUNTER (EMERGENCY)
Age: 72
Discharge: HOME | End: 2022-11-01
Attending: EMERGENCY MEDICINE
Payer: MEDICARE

## 2022-11-01 VITALS
BODY MASS INDEX: 38.51 KG/M2 | SYSTOLIC BLOOD PRESSURE: 151 MMHG | TEMPERATURE: 97.6 F | RESPIRATION RATE: 16 BRPM | DIASTOLIC BLOOD PRESSURE: 85 MMHG | OXYGEN SATURATION: 99 % | HEART RATE: 69 BPM | HEIGHT: 69 IN | WEIGHT: 260 LBS

## 2022-11-01 DIAGNOSIS — G89.29 ACUTE EXACERBATION OF CHRONIC LOW BACK PAIN: Primary | ICD-10-CM

## 2022-11-01 DIAGNOSIS — M54.31 SCIATICA OF RIGHT SIDE: ICD-10-CM

## 2022-11-01 DIAGNOSIS — M54.50 ACUTE EXACERBATION OF CHRONIC LOW BACK PAIN: Primary | ICD-10-CM

## 2022-11-01 DIAGNOSIS — M79.674 PAIN OF RIGHT GREAT TOE: ICD-10-CM

## 2022-11-01 PROCEDURE — 73630 X-RAY EXAM OF FOOT: CPT

## 2022-11-01 PROCEDURE — 6370000000 HC RX 637 (ALT 250 FOR IP): Performed by: EMERGENCY MEDICINE

## 2022-11-01 PROCEDURE — 96372 THER/PROPH/DIAG INJ SC/IM: CPT

## 2022-11-01 PROCEDURE — 99284 EMERGENCY DEPT VISIT MOD MDM: CPT

## 2022-11-01 PROCEDURE — 6360000002 HC RX W HCPCS: Performed by: EMERGENCY MEDICINE

## 2022-11-01 RX ORDER — OXYCODONE HYDROCHLORIDE AND ACETAMINOPHEN 5; 325 MG/1; MG/1
1 TABLET ORAL ONCE
Status: COMPLETED | OUTPATIENT
Start: 2022-11-01 | End: 2022-11-01

## 2022-11-01 RX ORDER — TRIAMCINOLONE ACETONIDE 40 MG/ML
40 INJECTION, SUSPENSION INTRA-ARTICULAR; INTRAMUSCULAR ONCE
Status: COMPLETED | OUTPATIENT
Start: 2022-11-01 | End: 2022-11-01

## 2022-11-01 RX ORDER — KETOROLAC TROMETHAMINE 30 MG/ML
15 INJECTION, SOLUTION INTRAMUSCULAR; INTRAVENOUS ONCE
Status: COMPLETED | OUTPATIENT
Start: 2022-11-01 | End: 2022-11-01

## 2022-11-01 RX ADMIN — TRIAMCINOLONE ACETONIDE 40 MG: 40 INJECTION, SUSPENSION INTRA-ARTICULAR; INTRAMUSCULAR at 11:23

## 2022-11-01 RX ADMIN — KETOROLAC TROMETHAMINE 15 MG: 30 INJECTION, SOLUTION INTRAMUSCULAR; INTRAVENOUS at 11:25

## 2022-11-01 RX ADMIN — OXYCODONE AND ACETAMINOPHEN 1 TABLET: 5; 325 TABLET ORAL at 11:23

## 2022-11-01 ASSESSMENT — PAIN SCALES - GENERAL
PAINLEVEL_OUTOF10: 8
PAINLEVEL_OUTOF10: 8

## 2022-11-01 ASSESSMENT — PAIN DESCRIPTION - LOCATION: LOCATION: BACK;FOOT

## 2022-11-01 ASSESSMENT — PAIN DESCRIPTION - DESCRIPTORS: DESCRIPTORS: ACHING;BURNING;SHARP

## 2022-11-01 ASSESSMENT — PAIN DESCRIPTION - PAIN TYPE: TYPE: ACUTE PAIN

## 2022-11-01 ASSESSMENT — PAIN DESCRIPTION - ONSET: ONSET: SUDDEN

## 2022-11-01 ASSESSMENT — PAIN DESCRIPTION - ORIENTATION: ORIENTATION: RIGHT

## 2022-11-01 ASSESSMENT — PAIN DESCRIPTION - FREQUENCY: FREQUENCY: CONTINUOUS

## 2022-11-01 ASSESSMENT — PAIN - FUNCTIONAL ASSESSMENT: PAIN_FUNCTIONAL_ASSESSMENT: 0-10

## 2022-11-01 NOTE — ED PROVIDER NOTES
HPI:  11/1/22, Time: 11:17 AM GRETCHEN Pierce is a 81 y.o. male presenting to the ED for pain in joint fo great toe, beginning a couple days ago. He denies trauma, redness, swelling  or toenail injury. He also states he is here for a flareup of his chronic back pain resulting in right-sided sciatica. He is in pain management and gets Percocet at home but does not want to take any more than prescribed. He states when it flares up he normally comes here and gets medicated and feels better. He denies new trauma, new pain, focal paresthesias, focal weakness, saddle paresthesias, urinary or stool incontinence or retention. The complaint has been persistent, moderate in severity, and worsened by movement. Patient denies fever/chills, sore throat, cough, congestion, chest pain, shortness of breath, edema, headache, visual disturbances, focal paresthesias, focal weakness, abdominal pain, nausea, vomiting, diarrhea, constipation, dysuria, hematuria, trauma, neck pain, rash or other complaints. ROS:   A complete review of systems was performed and all pertinent positives and negatives are stated within HPI, all other systems reviewed and are negative.      --------------------------------------------- PAST HISTORY ---------------------------------------------  Past Medical History:  has a past medical history of Anxiety, Back pain, Diabetes mellitus ([address] Hyperlipidemia, Hypertension, and Sciatica. Past Surgical History:  has a past surgical history that includes back surgery; knee surgery; other surgical history (5/8/2014); Carpal tunnel release; and hip surgery (Right). Social History:  reports that he quit smoking about 8 years ago. His smoking use included cigarettes. He has a 20.00 pack-year smoking history. He has never used smokeless tobacco. He reports that he does not drink alcohol and does not use drugs. Family History: family history is not on file.      The patients home medications have been reviewed. Allergies: Vicodin [hydrocodone-acetaminophen]        ----------------------------------------PHYSICAL EXAM--------------------------------------  Constitutional:  Well developed, well nourished, no acute distress, non-toxic appearance   Eyes:  PERRL, conjunctiva normal, EOMI  HENT:  Atraumatic, external ears normal, nose normal, oropharynx moist, no pharyngeal exudates. Neck- normal range of motion, no nuchal rigidity   Respiratory:  No respiratory distress, normal breath sounds, no rales, no wheezing   Cardiovascular:  Normal rate, normal rhythm, no murmurs, no gallops, no rubs. Radial and DP pulses 2+ bilaterally. Compartments are soft. He is warm and well perfused. Cap refill less than 3 seconds. GI:  Soft, nondistended, normal bowel sounds, nontender, no organomegaly, no mass, no rebound, no guarding   :  No costovertebral angle tenderness   Musculoskeletal:  No edema, no tenderness, no deformities. Back-no midline or paraspinal cervical or thoracic tenderness. Diffuse midline lumbar tenderness with bilateral paraspinal tenderness with reproduction of symptoms when pushing on the right sciatic nerve. Chest stable. Pelvis stable. Moves all 4 extremities without difficulty or pain. He does have tenderness to palpation of the MCP joint of the right great toe without swelling, redness, induration, fluctuance or temperature change. No toenail trauma. Integument:  Well hydrated, no rash. Adequate perfusion. Neurologic:  Alert & oriented x 3, CN 2-12 normal, no focal deficits noted. DTRs 2+ bilateral patellar. Normal gait. Psychiatric:  Speech and behavior appropriate       -------------------------------------------------- RESULTS -------------------------------------------------  I have personally reviewed all laboratory and imaging results for this patient. Results are listed below.      LABS:  No results found for this visit on 11/01/22. RADIOLOGY:  Interpreted by Radiologist.  XR FOOT RIGHT (MIN 3 VIEWS)   Final Result   Degenerative changes, inferior calcaneal spur seen. ------------------------- NURSING NOTES AND VITALS REVIEWED ---------------------------  The nursing notes within the ED encounter and vital signs as below have been reviewed by myself. BP (!) 151/85   Pulse 69   Temp 97.6 °F (36.4 °C)   Resp 16   Ht 5' 9\" (1.753 m)   Wt 260 lb (117.9 kg)   SpO2 99%   BMI 38.40 kg/m²   Oxygen Saturation Interpretation: Normal      The patients available past medical records and past encounters were reviewed. ------------------------------ ED COURSE/MEDICAL DECISION MAKING----------------------  Medications   triamcinolone acetonide (KENALOG-40) injection 40 mg (40 mg IntraMUSCular Given 11/1/22 1123)   ketorolac (TORADOL) injection 15 mg (15 mg IntraVENous Given 11/1/22 1125)   oxyCODONE-acetaminophen (PERCOCET) 5-325 MG per tablet 1 tablet (1 tablet Oral Given 11/1/22 1123)           Procedures:   none      Medical Decision Making:    No red flag symptoms for back pain, states this is his normal pain and does not need imaging just treatment. He is diabetic and states that steroids do make his blood sugar go up but he monitors them closely when he does use steroids and would like a dose here today. X-ray of right great toe/foot shows nothing acute. There is no sign of infection and while this could be gout there is no redness warmth or swelling just pain. Foot/toe x-ray negative acute. There is some arthritis in there. He does not have a clinical appearance of infection or gout at this time. He will follow-up with his podiatrist this week. Striction's reviewed. Patient was explicitly instructed on specific signs and symptoms on which to return to the emergency room for. Patient was instructed to return to the ER for any new or worsening symptoms.  Additional discharge instructions were given verbally. All questions were answered. Patient is comfortable and agreeable with discharge plan. Patient in no acute distress and non-toxic in appearance. This patient's ED course included: re-evaluation prior to disposition and a personal history and physicial eaxmination    This patient has remained hemodynamically stable and improved during their ED course. Re-Evaluations:  Time: 12:36 PM EDT   Re-evaluation. Patients symptoms are improving  Repeat physical examination is improved      Consultations:   none    Critical Care: none    I, Sugey Horner, DO, am the Primary Provider of Record    Counseling: The emergency provider has spoken with the patient and discussed todays results, in addition to providing specific details for the plan of care and counseling regarding the diagnosis and prognosis. Questions are answered at this time and they are agreeable with the plan.    --------------------------- IMPRESSION AND DISPOSITION ---------------------------------    IMPRESSION  1. Acute exacerbation of chronic low back pain    2. Sciatica of right side    3.  Pain of right great toe        DISPOSITION  Disposition: Discharge to home  Patient condition is stable             Diego Laguna DO  11/01/22 6822

## 2023-01-10 ENCOUNTER — APPOINTMENT (OUTPATIENT)
Dept: GENERAL RADIOLOGY | Age: 73
End: 2023-01-10
Payer: MEDICARE

## 2023-01-10 ENCOUNTER — HOSPITAL ENCOUNTER (EMERGENCY)
Age: 73
Discharge: HOME | End: 2023-01-11
Attending: EMERGENCY MEDICINE
Payer: MEDICARE

## 2023-01-10 ENCOUNTER — APPOINTMENT (OUTPATIENT)
Dept: CT IMAGING | Age: 73
End: 2023-01-10
Payer: MEDICARE

## 2023-01-10 DIAGNOSIS — S39.012A STRAIN OF LUMBAR REGION, INITIAL ENCOUNTER: ICD-10-CM

## 2023-01-10 DIAGNOSIS — S16.1XXA ACUTE CERVICAL MYOFASCIAL STRAIN, INITIAL ENCOUNTER: ICD-10-CM

## 2023-01-10 DIAGNOSIS — S80.00XA CONTUSION OF KNEE, UNSPECIFIED LATERALITY, INITIAL ENCOUNTER: ICD-10-CM
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
## 2023-01-10 DIAGNOSIS — S09.90XA CLOSED HEAD INJURY, INITIAL ENCOUNTER: Primary | ICD-10-CM

## 2023-01-10 PROCEDURE — 99284 EMERGENCY DEPT VISIT MOD MDM: CPT

## 2023-01-10 PROCEDURE — 96374 THER/PROPH/DIAG INJ IV PUSH: CPT

## 2023-01-10 PROCEDURE — 72125 CT NECK SPINE W/O DYE: CPT

## 2023-01-10 PROCEDURE — 96375 TX/PRO/DX INJ NEW DRUG ADDON: CPT

## 2023-01-10 PROCEDURE — 6360000002 HC RX W HCPCS: Performed by: EMERGENCY MEDICINE

## 2023-01-10 PROCEDURE — 70450 CT HEAD/BRAIN W/O DYE: CPT

## 2023-01-10 PROCEDURE — 72131 CT LUMBAR SPINE W/O DYE: CPT

## 2023-01-10 PROCEDURE — 73562 X-RAY EXAM OF KNEE 3: CPT

## 2023-01-10 PROCEDURE — 72192 CT PELVIS W/O DYE: CPT

## 2023-01-10 PROCEDURE — 73030 X-RAY EXAM OF SHOULDER: CPT

## 2023-01-10 RX ORDER — HYDROCODONE BITARTRATE AND ACETAMINOPHEN 10; 325 MG/1; MG/1
1 TABLET ORAL EVERY 6 HOURS PRN
COMMUNITY

## 2023-01-10 RX ORDER — ONDANSETRON 2 MG/ML
4 INJECTION INTRAMUSCULAR; INTRAVENOUS ONCE
Status: COMPLETED | OUTPATIENT
Start: 2023-01-10 | End: 2023-01-10

## 2023-01-10 RX ADMIN — ONDANSETRON 4 MG: 2 INJECTION INTRAMUSCULAR; INTRAVENOUS at 22:04

## 2023-01-10 RX ADMIN — HYDROMORPHONE HYDROCHLORIDE 1.5 MG: 1 INJECTION, SOLUTION INTRAMUSCULAR; INTRAVENOUS; SUBCUTANEOUS at 22:05

## 2023-01-10 ASSESSMENT — PAIN - FUNCTIONAL ASSESSMENT: PAIN_FUNCTIONAL_ASSESSMENT: 0-10

## 2023-01-10 ASSESSMENT — PAIN DESCRIPTION - PAIN TYPE: TYPE: ACUTE PAIN

## 2023-01-10 ASSESSMENT — PAIN DESCRIPTION - ONSET: ONSET: SUDDEN

## 2023-01-10 ASSESSMENT — PAIN DESCRIPTION - DESCRIPTORS: DESCRIPTORS: ACHING

## 2023-01-10 ASSESSMENT — PAIN SCALES - GENERAL
PAINLEVEL_OUTOF10: 5
PAINLEVEL_OUTOF10: 10

## 2023-01-10 ASSESSMENT — PAIN DESCRIPTION - FREQUENCY: FREQUENCY: CONTINUOUS

## 2023-01-11 VITALS
OXYGEN SATURATION: 93 % | DIASTOLIC BLOOD PRESSURE: 59 MMHG | SYSTOLIC BLOOD PRESSURE: 104 MMHG | HEIGHT: 69 IN | TEMPERATURE: 97.7 F | WEIGHT: 268 LBS | HEART RATE: 70 BPM | BODY MASS INDEX: 39.69 KG/M2 | RESPIRATION RATE: 18 BRPM

## 2023-01-11 NOTE — DISCHARGE INSTRUCTIONS
NOTE:  Get IMMEDIATE medical attention if any new/worsening symptoms occur.   Make sure to call your family doctor for a follow-up appointment within 3-4 days

## 2023-01-11 NOTE — ED PROVIDER NOTES
HPI:  1/10/23, Time: 9:37 PM BRANDY Hagen is a 81 y.o. male presenting to the ED for evaluation after a fall at home, beginning 1 hour ago. Patient states he tripped over an object and landed on a wooden frame of his couch injuring his head, his neck, his right shoulder and both knees. The complaint has been persistent, severe in severity, and worsened by changing position, bending, twisting. Patient does have history of prior back and neck surgery. He also history of right knee replacement surgery. Patient denies any loss of consciousness associated with the fall states the mechanism was purely mechanical as he tripped over an object. No relieving factors are reported. No other complaints. Patient is in pain management is already prescribed chronic narcotic analgesic meds for relief of his chronic pain. Review of Systems:   A complete review of systems was performed and pertinent positives and negatives are stated within HPI, all other systems reviewed and are negative.    --------------------------------------------- PAST HISTORY ---------------------------------------------  Past Medical History:  has a past medical history of Anxiety, Back pain, Diabetes mellitus [address] [address] Hyperlipidemia, Hypertension, and Sciatica. Past Surgical History:  has a past surgical history that includes back surgery; knee surgery; other surgical history (5/8/2014); Carpal tunnel release; and hip surgery (Right). Social History:  reports that he quit smoking about 9 years ago. His smoking use included cigarettes. He has a 20.00 pack-year smoking history. He has never used smokeless tobacco. He reports that he does not drink alcohol and does not use drugs. Family History: family history is not on file. The patients home medications have been reviewed.     Allergies: Vicodin [hydrocodone-acetaminophen]    -------------------------------------------------- RESULTS -------------------------------------------------  All laboratory and radiology results have been personally reviewed by myself   LABS:  No results found for this visit on 01/10/23. RADIOLOGY:  Interpreted by Radiologist.  [address]   Final Result   No acute intracranial abnormality. Chronic parenchymal change including atrophy and old white matter ischemia. CT CERVICAL SPINE WO CONTRAST   Final Result   No acute abnormality of the cervical spine. Multilevel bony and discogenic degenerative changes. If symptoms persist and/or progress, MRI cervical spine is advised. CT LUMBAR SPINE WO CONTRAST   Final Result   Unremarkable non-contrast CT of the lumbar spine. Intact posterior hardware interbody fusion, as detailed above. Advanced aortoiliac atherosclerotic disease. CT PELVIS WO CONTRAST Additional Contrast? None   Final Result   No acute osseous injury or dislocation. Intact hardware bridging right sacroiliac joint. XR KNEE RIGHT (3 VIEWS)   Final Result   No acute abnormality of the knee. XR SHOULDER RIGHT (MIN 2 VIEWS)   Final Result   No acute abnormality. XR KNEE LEFT (3 VIEWS)   Final Result   No acute abnormality of the knee.             ------------------------- NURSING NOTES AND VITALS REVIEWED ---------------------------     The nursing notes within the ED encounter and vital signs as below have been reviewed. BP (!) 104/59   Pulse 70   Temp 97.7 °F (36.5 °C) (Oral)   Resp 18   Ht 5' 9\" (1.753 m)   Wt 268 lb (121.6 kg)   SpO2 93%   BMI 39.58 kg/m²   Oxygen Saturation Interpretation: Normal      ---------------------------------------------------PHYSICAL EXAM--------------------------------------      Constitutional/General: Alert and oriented x3, well appearing, non toxic in moderate distress  Head: Small area of ecchymosis noted of the forehead.   No raccoon eye sign, no lainez sign, no hemotympanums, no lacerations  Eyes: PERRL, EOMI no scleral injection, no subconjunctival hemorrhage  Mouth: Oropharynx clear, handling secretions, no trismus  Neck: Supple, full ROM, old healing surgical scar noted but no midline vertebral tenderness no step-off no crepitus  Pulmonary: Lungs clear to auscultation bilaterally, no wheezes, rales, or rhonchi. Not in respiratory distress  Cardiovascular:  Regular rate and rhythm, no murmurs, gallops, or rubs. 2+ distal pulses  Abdomen: Soft, non tender, non distended, no organomegaly no masses no guarding no rigidity normal active bowel sounds  Extremities: Moves all extremities x 4. Warm and well perfused; patient has old healing surgical scar of his right knee and has had some slight decrease range of motion to flexion extension of the right knee knee but no ligament laxity on bedside provocative servando of both knees. .  No evidence of a dislocation of the knees bilaterally. No ankle tenderness bilaterally. Old healed surgical scar noted lumbar area. No step-off no crepitus no midline vertebral tenderness of the lumbar area no thoracic vertebral spine. Examination right shoulder shows small area of ecchymosis but no crepitus. Good sensation anterior deltoid of the right shoulder. Skin: warm and dry without rash  Neurologic: GCS 15, cranial nerves II through XII intact no focal deficits. No meningeal signs. Psych: Normal Affect    ------------------------------ ED COURSE/MEDICAL DECISION MAKING----------------------  Medications   HYDROmorphone (DILAUDID) injection 1.5 mg (1.5 mg IntraVENous Given 1/10/23 2205)   ondansetron (ZOFRAN) injection 4 mg (4 mg IntraVENous Given 1/10/23 2204)       ED COURSE:     Medical Decision Making:   Differential diagnoses include: Traumatic subarachnoid hemorrhage, subdural hematoma, epidural hematoma, skull fracture cervical vertebral fracture, lumbar thoracic fracture was extremity injuries to bilateral knees and right shoulder.   CT head/CT cervical spine were all read as negative for any acute traumatic injury. X-rays of the right shoulder and bilateral knees also showed no evidence of any acute fractures. Patient received Dilaudid weight appropriate dose here in the department 1.5 mg IM. Patient has prescription for narcotic analgesic meds already and will follow-up as an outpatient with his primary care physician and/or his orthopedic physician for reevaluation. Counseling: The emergency provider has spoken with the patient and discussed todays results, in addition to providing specific details for the plan of care and counseling regarding the diagnosis and prognosis. Questions are answered at this time and they are agreeable with the plan.    --------------------------------- IMPRESSION AND DISPOSITION ---------------------------------    IMPRESSION  1. Closed head injury, initial encounter    2. Acute cervical myofascial strain, initial encounter    3. Contusion of knee, unspecified laterality, initial encounter    4. Strain of lumbar region, initial encounter    5. Fall, initial encounter        DISPOSITION  Disposition: Discharge to home  Patient condition is stable      NOTE: Francoise Gamez is good sensation. Report was transcribed using voice recognition software.  Every effort was made to ensure accuracy; however, inadvertent computerized transcription errors may be present       Leticia Rodríguez MD  01/12/23 4039

## 2023-02-18 ENCOUNTER — HOSPITAL ENCOUNTER (EMERGENCY)
Age: 73
Discharge: HOME | End: 2023-02-18
Attending: EMERGENCY MEDICINE
Payer: MEDICARE

## 2023-02-18 VITALS
OXYGEN SATURATION: 98 % | RESPIRATION RATE: 16 BRPM | WEIGHT: 268 LBS | SYSTOLIC BLOOD PRESSURE: 150 MMHG | TEMPERATURE: 98.3 F | BODY MASS INDEX: 39.58 KG/M2 | HEART RATE: 91 BPM | DIASTOLIC BLOOD PRESSURE: 100 MMHG

## 2023-02-18 DIAGNOSIS — G89.29 ACUTE EXACERBATION OF CHRONIC LOW BACK PAIN: Primary | ICD-10-CM

## 2023-02-18 DIAGNOSIS — M54.50 ACUTE EXACERBATION OF CHRONIC LOW BACK PAIN: Primary | ICD-10-CM

## 2023-02-18 LAB
ALBUMIN SERPL-MCNC: 4.5 G/DL (ref 3.5–5.2)
ALP BLD-CCNC: 79 U/L (ref 40–129)
ALT SERPL-CCNC: 49 U/L (ref 0–40)
ANION GAP SERPL CALCULATED.3IONS-SCNC: 17 MMOL/L (ref 7–16)
AST SERPL-CCNC: 56 U/L (ref 0–39)
BASOPHILS ABSOLUTE: 0.06 E9/L (ref 0–0.2)
BASOPHILS RELATIVE PERCENT: 0.6 % (ref 0–2)
BILIRUB SERPL-MCNC: 0.4 MG/DL (ref 0–1.2)
BUN BLDV-MCNC: 18 MG/DL (ref 6–23)
CALCIUM SERPL-MCNC: 10.6 MG/DL (ref 8.6–10.2)
CHLORIDE BLD-SCNC: 98 MMOL/L (ref 98–107)
CO2: 20 MMOL/L (ref 22–29)
CREAT SERPL-MCNC: 1 MG/DL (ref 0.7–1.2)
EKG ATRIAL RATE: 83 BPM
EKG P AXIS: 32 DEGREES
EKG P-R INTERVAL: 164 MS
EKG Q-T INTERVAL: 374 MS
EKG QRS DURATION: 90 MS
EKG QTC CALCULATION (BAZETT): 439 MS
EKG R AXIS: -23 DEGREES
EKG T AXIS: 31 DEGREES
EKG VENTRICULAR RATE: 83 BPM
EOSINOPHILS ABSOLUTE: 0.23 E9/L (ref 0.05–0.5)
EOSINOPHILS RELATIVE PERCENT: 2.3 % (ref 0–6)
GFR SERPL CREATININE-BSD FRML MDRD: >60 ML/MIN/1.73
GLUCOSE BLD-MCNC: 102 MG/DL (ref 74–99)
HCT VFR BLD CALC: 34.3 % (ref 37–54)
HEMOGLOBIN: 11.2 G/DL (ref 12.5–16.5)
IMMATURE GRANULOCYTES #: 0.07 E9/L
IMMATURE GRANULOCYTES %: 0.7 % (ref 0–5)
LYMPHOCYTES ABSOLUTE: 2.2 E9/L (ref 1.5–4)
LYMPHOCYTES RELATIVE PERCENT: 22.5 % (ref 20–42)
MCH RBC QN AUTO: 27.7 PG (ref 26–35)
MCHC RBC AUTO-ENTMCNC: 32.7 % (ref 32–34.5)
MCV RBC AUTO: 84.9 FL (ref 80–99.9)
MONOCYTES ABSOLUTE: 0.88 E9/L (ref 0.1–0.95)
MONOCYTES RELATIVE PERCENT: 9 % (ref 2–12)
NEUTROPHILS ABSOLUTE: 6.35 E9/L (ref 1.8–7.3)
NEUTROPHILS RELATIVE PERCENT: 64.9 % (ref 43–80)
PDW BLD-RTO: 14.8 FL (ref 11.5–15)
PLATELET # BLD: 160 E9/L (ref 130–450)
PMV BLD AUTO: 11.6 FL (ref 7–12)
POTASSIUM REFLEX MAGNESIUM: 4.5 MMOL/L (ref 3.5–5)
RBC # BLD: 4.04 E12/L (ref 3.8–5.8)
RBC # BLD: NORMAL 10*6/UL
REASON FOR REJECTION: NORMAL
REJECTED TEST: NORMAL
SODIUM BLD-SCNC: 135 MMOL/L (ref 132–146)
TOTAL PROTEIN: 8.2 G/DL (ref 6.4–8.3)
WBC # BLD: 9.8 E9/L (ref 4.5–11.5)

## 2023-02-18 PROCEDURE — 96372 THER/PROPH/DIAG INJ SC/IM: CPT

## 2023-02-18 PROCEDURE — 93005 ELECTROCARDIOGRAM TRACING: CPT

## 2023-02-18 PROCEDURE — 85025 COMPLETE CBC W/AUTO DIFF WBC: CPT

## 2023-02-18 PROCEDURE — 80053 COMPREHEN METABOLIC PANEL: CPT

## 2023-02-18 PROCEDURE — 36415 COLL VENOUS BLD VENIPUNCTURE: CPT

## 2023-02-18 PROCEDURE — 96374 THER/PROPH/DIAG INJ IV PUSH: CPT

## 2023-02-18 PROCEDURE — 96375 TX/PRO/DX INJ NEW DRUG ADDON: CPT

## 2023-02-18 PROCEDURE — 6360000002 HC RX W HCPCS

## 2023-02-18 PROCEDURE — 99284 EMERGENCY DEPT VISIT MOD MDM: CPT

## 2023-02-18 RX ORDER — KETOROLAC TROMETHAMINE 30 MG/ML
15 INJECTION, SOLUTION INTRAMUSCULAR; INTRAVENOUS ONCE
Status: COMPLETED | OUTPATIENT
Start: 2023-02-18 | End: 2023-02-18

## 2023-02-18 RX ORDER — PREDNISONE 20 MG/1
40 TABLET ORAL DAILY
Qty: 10 TABLET | Refills: 0 | Status: SHIPPED | OUTPATIENT
Start: 2023-02-18 | End: 2023-02-23

## 2023-02-18 RX ORDER — ORPHENADRINE CITRATE 30 MG/ML
60 INJECTION INTRAMUSCULAR; INTRAVENOUS ONCE
Status: COMPLETED | OUTPATIENT
Start: 2023-02-18 | End: 2023-02-18

## 2023-02-18 RX ORDER — FENTANYL CITRATE 50 UG/ML
50 INJECTION, SOLUTION INTRAMUSCULAR; INTRAVENOUS ONCE
Status: COMPLETED | OUTPATIENT
Start: 2023-02-18 | End: 2023-02-18

## 2023-02-18 RX ORDER — METHYLPREDNISOLONE SODIUM SUCCINATE 125 MG/2ML
60 INJECTION, POWDER, LYOPHILIZED, FOR SOLUTION INTRAMUSCULAR; INTRAVENOUS ONCE
Status: COMPLETED | OUTPATIENT
Start: 2023-02-18 | End: 2023-02-18

## 2023-02-18 RX ADMIN — KETOROLAC TROMETHAMINE 15 MG: 30 INJECTION, SOLUTION INTRAMUSCULAR; INTRAVENOUS at 18:45

## 2023-02-18 RX ADMIN — METHYLPREDNISOLONE SODIUM SUCCINATE 60 MG: 125 INJECTION, POWDER, FOR SOLUTION INTRAMUSCULAR; INTRAVENOUS at 18:56

## 2023-02-18 RX ADMIN — FENTANYL CITRATE 50 MCG: 50 INJECTION, SOLUTION INTRAMUSCULAR; INTRAVENOUS at 18:47

## 2023-02-18 RX ADMIN — ORPHENADRINE CITRATE 60 MG: 30 INJECTION INTRAMUSCULAR; INTRAVENOUS at 18:51

## 2023-02-18 ASSESSMENT — PAIN DESCRIPTION - DESCRIPTORS: DESCRIPTORS: DISCOMFORT;SORE

## 2023-02-18 ASSESSMENT — PAIN - FUNCTIONAL ASSESSMENT: PAIN_FUNCTIONAL_ASSESSMENT: 0-10

## 2023-02-18 ASSESSMENT — PAIN SCALES - GENERAL
PAINLEVEL_OUTOF10: 8
PAINLEVEL_OUTOF10: 9
PAINLEVEL_OUTOF10: 10
PAINLEVEL_OUTOF10: 9

## 2023-02-18 ASSESSMENT — PAIN DESCRIPTION - FREQUENCY: FREQUENCY: CONTINUOUS

## 2023-02-18 ASSESSMENT — PAIN DESCRIPTION - PAIN TYPE: TYPE: ACUTE PAIN

## 2023-02-18 ASSESSMENT — PAIN DESCRIPTION - ONSET: ONSET: ON-GOING

## 2023-02-18 ASSESSMENT — LIFESTYLE VARIABLES
HOW OFTEN DO YOU HAVE A DRINK CONTAINING ALCOHOL: NEVER
HOW MANY STANDARD DRINKS CONTAINING ALCOHOL DO YOU HAVE ON A TYPICAL DAY: PATIENT DOES NOT DRINK

## 2023-02-18 ASSESSMENT — PAIN DESCRIPTION - LOCATION: LOCATION: BACK;HIP;LEG;FOOT

## 2023-02-18 NOTE — ED PROVIDER NOTES
Shnathi Perez is a 81 y.o. male    HPI  Shanthi Perez is a 81 y.o. male presenting to the ED for Back Pain (Lower back pain, pain shooting into bilateral hips legs into feet, some swelling in feet)    History comes primarily from the patient. Patient presents to the emergency department for back pain with pain radiating down both his legs which has been worsening over the last week. Patient has a history of low back pain with right-sided sciatica specifically, although he has some radiation to the left leg as well according to his history. The patient says it is no different from his regular pain, just worse than normal.  His wife who is also present states that he intermittently gets these flareups because once he start feeling better he immediately goes out into the yard and starts doing work for a couple days until the pain returns and he can no longer do it. However he is also complaining of some lower extremity swelling which improves with movement but worsens in the bed and at rest.        ROS  Full review of systems completed. Pertinent positives and negatives per the HPI, unless otherwise stated ROS is negative. Physical Exam  Vitals reviewed. Constitutional:       General: He is not in acute distress. Appearance: Normal appearance. He is morbidly obese. He is not ill-appearing. HENT:      Head: Normocephalic and atraumatic. Right Ear: External ear normal.      Left Ear: External ear normal.      Nose: Nose normal. No rhinorrhea. Mouth/Throat:      Mouth: Mucous membranes are moist.      Pharynx: Oropharynx is clear. Eyes:      Extraocular Movements: Extraocular movements intact. Conjunctiva/sclera: Conjunctivae normal.      Pupils: Pupils are equal, round, and reactive to light. Cardiovascular:      Rate and Rhythm: Normal rate and regular rhythm. Heart sounds: Normal heart sounds. No murmur heard.   Pulmonary:      Effort: Pulmonary effort is normal. No respiratory distress. Breath sounds: Normal breath sounds. Abdominal:      General: Abdomen is flat. There is no distension. Tenderness: There is no abdominal tenderness. Musculoskeletal:         General: Tenderness present. No swelling. Normal range of motion. Cervical back: Normal range of motion. No rigidity or tenderness. Comments: Has tenderness throughout his lower back and his bilateral hips into his upper thighs. 1+ pitting edema to bilateral lower extremities. Skin:     General: Skin is warm and dry. Findings: No rash. Neurological:      General: No focal deficit present. Mental Status: He is alert and oriented to person, place, and time. Cranial Nerves: No cranial nerve deficit. Motor: No weakness. Psychiatric:         Mood and Affect: Mood normal.         Behavior: Behavior normal.          Medical Decision Making/Differential Diagnosis:    CC/HPI Summary, social determinants of health, records reviewed, DDX, testing done/not done, ED course, reassessment, disposition considerations/shared decision making with patient, consults, disposition:    Medical Decision Making  Amount and/or Complexity of Data Reviewed  Independent Historian: spouse  Labs: ordered. Decision-making details documented in ED Course. ECG/medicine tests: ordered and independent interpretation performed. Decision-making details documented in ED Course. Risk  Prescription drug management. EKG is ordered to have documentation of patient's current rhythm, and to rule out any obvious acute cardiac illnesses such as ACS. Additionally, QT interval may be of use in decision making regarding any medications administered here in the ED. CBC is ordered to evaluate for any signs of infection or inflammation by obtaining a WBC count, or any signs of acute anemia by interpreting hemoglobin.  CMP was ordered to evaluate for any electrolyte imbalances, kidney function, or any elevations in anion gap. Labs are independently interpreted by me. From CO2 of 20, calcium 10.6, ALT 49, AST 56, hemoglobin 11.2. AST and ALT are slightly elevated compared to previous, although that was 2 years ago. The patient is having no acute abdominal symptoms concerning for liver or gallbladder problems. EKG interpreted below but stable. Patient treated for his back pain with Solu-Medrol, Norflex, fentanyl and ketorolac which significantly improved his symptoms. Patient will be discharged home with a prescription for prednisone. The patient also has at home Flexeril and Norco's. Considering the patient is feeling much better, the patient will be discharged home to follow-up with his primary care physician as well as back surgeon. EKG interpretation: This EKG is signed and interpreted by me. Rate: 83  Rhythm: sinus  Axis: Normal to leftward  Interpretation: No ST elevations, QRS is narrow, QTc is 439  Comparison: was normal and stable as compared to patient's most recent EKG      Past medical history/chonic conditions affecting care   has a past medical history of Anxiety, Back pain, Diabetes mellitus ([address] Hyperlipidemia, Hypertension, and Sciatica. Social History     Tobacco Use    Smoking status: Former     Packs/day: 1.00     Years: 20.00     Pack years: 20.00     Types: Cigarettes     Quit date: 2014     Years since quittin.1    Smokeless tobacco: Never   Substance Use Topics    Alcohol use: No     Alcohol/week: 0.0 standard drinks    Drug use: No         As interpreted by me, the below displayed labs are abnormal. All other labs obtained during this visit were within normal range or not returned as of this dictation. Labs Reviewed   CBC WITH AUTO DIFFERENTIAL - Abnormal; Notable for the following components:       Result Value    Hemoglobin 11.2 (*)     Hematocrit 34.3 (*)     All other components within normal limits    Narrative:     CALL  [facility] tel. [phone],  Rejected Test Name/Called to: GIANLUCA/WALLACE Bailey, 02/18/2023 17:28, by MATT   COMPREHENSIVE METABOLIC PANEL W/ REFLEX TO MG FOR LOW K - Abnormal; Notable for the following components:    CO2 20 (*)     Anion Gap 17 (*)     Glucose 102 (*)     Calcium 10.6 (*)     ALT 49 (*)     AST 56 (*)     All other components within normal limits   SPECIMEN REJECTION    Narrative:     Trista Espinoza tel. [phone],  Rejected Test Name/Called to: WALLACE Sommers, 02/18/2023 17:28, by Stan Ring       Interpretation per the Radiologist below, if available at the time of this note:  No orders to display     No results found. No results found. Emergency Department Course  Vitals:    Vitals:    02/18/23 1530 02/18/23 1539   BP: (!) 150/100    Pulse: 91    Resp: 16    Temp: 98.3 °F (36.8 °C)    TempSrc: Oral    SpO2: 98%    Weight:  268 lb (121.6 kg)       Patient was given the following medications:  Medications   fentaNYL (SUBLIMAZE) injection 50 mcg (50 mcg IntraVENous Given 2/18/23 1847)   methylPREDNISolone sodium (SOLU-MEDROL) injection 60 mg (60 mg IntraVENous Given 2/18/23 1856)   orphenadrine (NORFLEX) injection 60 mg (60 mg IntraMUSCular Given 2/18/23 1851)   ketorolac (TORADOL) injection 15 mg (15 mg IntraVENous Given 2/18/23 1845)               I am the Primary Clinician of Record. Final impression  1.  Acute exacerbation of chronic low back pain          Disposition/plan  DISPOSITION Decision To Discharge 02/18/2023 07:31:48 PM    PATIENT REFERRED TO:  Naty Nicholas MD  [address] [facility] [address] [phone]    Call in 3 days  As needed    Your back surgeon    Call in 2 days      DISCHARGE MEDICATIONS:  Discharge Medication List as of 2/18/2023  7:33 PM        START taking these medications    Details   predniSONE (DELTASONE) 20 MG tablet Take 2 tablets by mouth daily for 5 days, Disp-10 tablet, R-0Normal             DISCONTINUED MEDICATIONS:  Discharge Medication List as of 2/18/2023  7:33 PM                 (Please note that portions of this note were completed with a voice recognition program.  Efforts were made to edit the dictations but occasionally words are mis-transcribed.)         Brittanie Fowler MD  Resident  02/18/23 0369

## 2023-02-20 LAB
EKG ATRIAL RATE: 83 BPM
EKG P AXIS: 32 DEGREES
EKG P-R INTERVAL: 164 MS
EKG Q-T INTERVAL: 374 MS
EKG QRS DURATION: 90 MS
EKG QTC CALCULATION (BAZETT): 439 MS
EKG R AXIS: -23 DEGREES
EKG T AXIS: 31 DEGREES
EKG VENTRICULAR RATE: 83 BPM

## 2023-02-20 PROCEDURE — 93010 ELECTROCARDIOGRAM REPORT: CPT | Performed by: INTERNAL MEDICINE

## 2023-04-30 ENCOUNTER — HOSPITAL ENCOUNTER (EMERGENCY)
Age: 73
Discharge: HOME | End: 2023-04-30
Attending: STUDENT IN AN ORGANIZED HEALTH CARE EDUCATION/TRAINING PROGRAM
Payer: MEDICARE

## 2023-04-30 VITALS
TEMPERATURE: 98 F | HEART RATE: 79 BPM | SYSTOLIC BLOOD PRESSURE: 143 MMHG | DIASTOLIC BLOOD PRESSURE: 78 MMHG | BODY MASS INDEX: 38.4 KG/M2 | OXYGEN SATURATION: 95 % | RESPIRATION RATE: 16 BRPM | WEIGHT: 260 LBS

## 2023-04-30 DIAGNOSIS — G89.29 CHRONIC BILATERAL LOW BACK PAIN, UNSPECIFIED WHETHER SCIATICA PRESENT: Primary | ICD-10-CM

## 2023-04-30 DIAGNOSIS — M54.50 CHRONIC BILATERAL LOW BACK PAIN, UNSPECIFIED WHETHER SCIATICA PRESENT: Primary | ICD-10-CM

## 2023-04-30 PROCEDURE — 6370000000 HC RX 637 (ALT 250 FOR IP): Performed by: STUDENT IN AN ORGANIZED HEALTH CARE EDUCATION/TRAINING PROGRAM

## 2023-04-30 PROCEDURE — 99283 EMERGENCY DEPT VISIT LOW MDM: CPT

## 2023-04-30 RX ORDER — HYDROCODONE BITARTRATE AND ACETAMINOPHEN 5; 325 MG/1; MG/1
2 TABLET ORAL ONCE
Status: COMPLETED | OUTPATIENT
Start: 2023-04-30 | End: 2023-04-30

## 2023-04-30 RX ADMIN — HYDROCODONE BITARTRATE AND ACETAMINOPHEN 2 TABLET: 5; 325 TABLET ORAL at 16:44

## 2023-04-30 ASSESSMENT — PAIN DESCRIPTION - LOCATION: LOCATION: LEG

## 2023-04-30 ASSESSMENT — ENCOUNTER SYMPTOMS
NAUSEA: 0
ABDOMINAL PAIN: 0
VOMITING: 0
COUGH: 0
BACK PAIN: 1

## 2023-04-30 ASSESSMENT — PAIN SCALES - GENERAL
PAINLEVEL_OUTOF10: 10
PAINLEVEL_OUTOF10: 10

## 2023-04-30 ASSESSMENT — PAIN DESCRIPTION - PAIN TYPE: TYPE: ACUTE PAIN

## 2023-04-30 ASSESSMENT — PAIN DESCRIPTION - ORIENTATION: ORIENTATION: LEFT;RIGHT

## 2023-04-30 ASSESSMENT — PAIN DESCRIPTION - FREQUENCY: FREQUENCY: CONTINUOUS

## 2023-04-30 ASSESSMENT — PAIN DESCRIPTION - ONSET: ONSET: ON-GOING

## 2023-04-30 ASSESSMENT — PAIN - FUNCTIONAL ASSESSMENT: PAIN_FUNCTIONAL_ASSESSMENT: 0-10

## 2023-04-30 ASSESSMENT — PAIN DESCRIPTION - DESCRIPTORS: DESCRIPTORS: DISCOMFORT;ACHING;SORE

## 2023-05-30 ENCOUNTER — HOSPITAL ENCOUNTER (EMERGENCY)
Age: 73
Discharge: HOME | End: 2023-05-30
Payer: MEDICARE

## 2023-05-30 VITALS
RESPIRATION RATE: 16 BRPM | WEIGHT: 260 LBS | TEMPERATURE: 97.6 F | SYSTOLIC BLOOD PRESSURE: 147 MMHG | BODY MASS INDEX: 38.4 KG/M2 | OXYGEN SATURATION: 96 % | DIASTOLIC BLOOD PRESSURE: 79 MMHG | HEART RATE: 77 BPM

## 2023-05-30 DIAGNOSIS — G89.29 ACUTE EXACERBATION OF CHRONIC LOW BACK PAIN: Primary | ICD-10-CM

## 2023-05-30 DIAGNOSIS — M54.50 ACUTE EXACERBATION OF CHRONIC LOW BACK PAIN: Primary | ICD-10-CM

## 2023-05-30 PROCEDURE — 96374 THER/PROPH/DIAG INJ IV PUSH: CPT

## 2023-05-30 PROCEDURE — 6360000002 HC RX W HCPCS: Performed by: NURSE PRACTITIONER

## 2023-05-30 PROCEDURE — 6370000000 HC RX 637 (ALT 250 FOR IP): Performed by: NURSE PRACTITIONER

## 2023-05-30 PROCEDURE — 99284 EMERGENCY DEPT VISIT MOD MDM: CPT

## 2023-05-30 RX ORDER — DIAZEPAM 5 MG/1
5 TABLET ORAL ONCE
Status: COMPLETED | OUTPATIENT
Start: 2023-05-30 | End: 2023-05-30

## 2023-05-30 RX ORDER — DEXAMETHASONE SODIUM PHOSPHATE 4 MG/ML
10 INJECTION, SOLUTION INTRA-ARTICULAR; INTRALESIONAL; INTRAMUSCULAR; INTRAVENOUS; SOFT TISSUE ONCE
Status: COMPLETED | OUTPATIENT
Start: 2023-05-30 | End: 2023-05-30

## 2023-05-30 RX ORDER — TIZANIDINE 2 MG/1
2 TABLET ORAL EVERY 6 HOURS PRN
Qty: 30 TABLET | Refills: 0 | Status: SHIPPED | OUTPATIENT
Start: 2023-05-30

## 2023-05-30 RX ADMIN — DEXAMETHASONE SODIUM PHOSPHATE 10 MG: 4 INJECTION, SOLUTION INTRAMUSCULAR; INTRAVENOUS at 11:56

## 2023-05-30 RX ADMIN — DIAZEPAM 5 MG: 5 TABLET ORAL at 11:57

## 2023-05-30 ASSESSMENT — PAIN SCALES - GENERAL
PAINLEVEL_OUTOF10: 4
PAINLEVEL_OUTOF10: 8

## 2023-05-30 ASSESSMENT — PAIN DESCRIPTION - DESCRIPTORS: DESCRIPTORS: THROBBING;SHOOTING;SHARP

## 2023-05-30 ASSESSMENT — LIFESTYLE VARIABLES: HOW OFTEN DO YOU HAVE A DRINK CONTAINING ALCOHOL: NEVER

## 2023-05-30 ASSESSMENT — PAIN DESCRIPTION - LOCATION: LOCATION: BACK;BUTTOCKS;LEG

## 2023-05-30 ASSESSMENT — PAIN DESCRIPTION - ORIENTATION: ORIENTATION: RIGHT;LEFT

## 2023-05-30 NOTE — ED PROVIDER NOTES
quickly. On exam patient has a bilateral lumbar sacral paraspinal tenderness, positive straight leg test bilaterally. Bilateral DTRs are intact to patellar and Achilles. Bilateral lower extremity strength 5 out of 5. As this is patient's baseline pain and has been occurring for 3 weeks with no direct trauma, patient has had no fevers, no history of alcoholism or drug use no red flag symptoms imaging was deferred. Patient was medicated with Decadron and Valium. On reevaluation as above, patient did good good relief from the Valium and Decadron. I had previously discussed the use of Robaxin however his insurance would not cover Robaxin. I further discussed use of Zanaflex with patient, he is agreeable to this. Patient was advised that he will need to call his pain management specialist and Dr. Fabian Scheuermann office. Patient was given strict return to ER precautions and is comfortable and agreeable with plan for discharge home. Patient was explicitly instructed on specific signs and symptoms on which to return to the emergency room for. Patient was instructed to return to the ER for any new or worsening symptoms. Additional discharge instructions were given verbally. All questions were answered. Patient is comfortable and agreeable with discharge plan. Patient in no acute distress and non-toxic in appearance. Assessment      1. Acute exacerbation of chronic low back pain      Plan   Discharged home. Patient condition is good    New Medications     Discharge Medication List as of 5/30/2023  1:34 PM        START taking these medications    Details   tiZANidine (ZANAFLEX) 2 MG tablet Take 1 tablet by mouth every 6 hours as needed (low back pain/muscle spasm), Disp-30 tablet, R-0Normal           Electronically signed by Doroteo Fabry, APRN - CNP   DD: 5/30/23  **This report was transcribed using voice recognition software.  Every effort was made to ensure accuracy; however, inadvertent computerized

## 2023-05-30 NOTE — DISCHARGE INSTRUCTIONS
Please follow-up with Dr. Sandeep Smith as planned. You may use the Zanaflex every 6 hours as needed for muscle spasm. If you develop any numbness, tingling to your privates, bowel or bladder incontinence, bladder retention, weakness of the legs please return to the ER.

## 2023-07-02 ENCOUNTER — HOSPITAL ENCOUNTER (EMERGENCY)
Age: 73
Discharge: HOME | End: 2023-07-02
Payer: MEDICARE

## 2023-07-02 VITALS
BODY MASS INDEX: 38.51 KG/M2 | OXYGEN SATURATION: 96 % | HEART RATE: 76 BPM | WEIGHT: 260 LBS | HEIGHT: 69 IN | DIASTOLIC BLOOD PRESSURE: 86 MMHG | TEMPERATURE: 97.9 F | SYSTOLIC BLOOD PRESSURE: 150 MMHG | RESPIRATION RATE: 16 BRPM

## 2023-07-02 DIAGNOSIS — M54.50 ACUTE EXACERBATION OF CHRONIC LOW BACK PAIN: Primary | ICD-10-CM

## 2023-07-02 DIAGNOSIS — G89.29 ACUTE EXACERBATION OF CHRONIC LOW BACK PAIN: Primary | ICD-10-CM

## 2023-07-02 PROCEDURE — 6370000000 HC RX 637 (ALT 250 FOR IP): Performed by: NURSE PRACTITIONER

## 2023-07-02 PROCEDURE — 99284 EMERGENCY DEPT VISIT MOD MDM: CPT

## 2023-07-02 PROCEDURE — 96372 THER/PROPH/DIAG INJ SC/IM: CPT

## 2023-07-02 PROCEDURE — 6360000002 HC RX W HCPCS: Performed by: NURSE PRACTITIONER

## 2023-07-02 RX ORDER — CYCLOBENZAPRINE HCL 10 MG
10 TABLET ORAL ONCE
Status: COMPLETED | OUTPATIENT
Start: 2023-07-02 | End: 2023-07-02

## 2023-07-02 RX ORDER — KETOROLAC TROMETHAMINE 30 MG/ML
30 INJECTION, SOLUTION INTRAMUSCULAR; INTRAVENOUS ONCE
Status: COMPLETED | OUTPATIENT
Start: 2023-07-02 | End: 2023-07-02

## 2023-07-02 RX ORDER — LIDOCAINE 50 MG/G
1 PATCH TOPICAL DAILY
Qty: 10 PATCH | Refills: 0 | Status: SHIPPED | OUTPATIENT
Start: 2023-07-02 | End: 2023-07-12

## 2023-07-02 RX ORDER — CYCLOBENZAPRINE HCL 5 MG
5 TABLET ORAL 3 TIMES DAILY PRN
Qty: 20 TABLET | Refills: 0 | Status: SHIPPED | OUTPATIENT
Start: 2023-07-02 | End: 2023-07-12

## 2023-07-02 RX ADMIN — KETOROLAC TROMETHAMINE 30 MG: 30 INJECTION, SOLUTION INTRAMUSCULAR; INTRAVENOUS at 22:09

## 2023-07-02 RX ADMIN — CYCLOBENZAPRINE 10 MG: 10 TABLET, FILM COATED ORAL at 22:08

## 2023-07-02 ASSESSMENT — PAIN DESCRIPTION - LOCATION
LOCATION: BACK
LOCATION: BACK

## 2023-07-02 ASSESSMENT — PAIN DESCRIPTION - DESCRIPTORS
DESCRIPTORS: ACHING;CRAMPING;DISCOMFORT
DESCRIPTORS: ACHING;BURNING;SHOOTING;SHARP

## 2023-07-02 ASSESSMENT — PAIN DESCRIPTION - PAIN TYPE: TYPE: ACUTE PAIN

## 2023-07-02 ASSESSMENT — PAIN SCALES - GENERAL
PAINLEVEL_OUTOF10: 10
PAINLEVEL_OUTOF10: 10

## 2023-07-02 ASSESSMENT — PAIN DESCRIPTION - ONSET: ONSET: ON-GOING

## 2023-07-02 ASSESSMENT — PAIN DESCRIPTION - FREQUENCY: FREQUENCY: CONTINUOUS

## 2023-07-02 ASSESSMENT — PAIN DESCRIPTION - ORIENTATION
ORIENTATION: RIGHT;LEFT;LOWER
ORIENTATION: MID;LOWER

## 2023-07-03 NOTE — ED PROVIDER NOTES
[address] ENCOUNTER        Pt Name: Radha Dobbins MRN: [id number]  Birthdate 1950  Date of evaluation: 7/2/2023  Provider: SHIRA Abel - CNP  PCP: Arjun Williamson MD  Note Started: 12:02 AM EDT 7/3/23      NAMRATA. I have evaluated this patient. CHIEF COMPLAINT       Chief Complaint   Patient presents with    Back Pain     Since yesterday c/o pain lumbar back with radiation to both hips and down both legs       HISTORY OF PRESENT ILLNESS: 1 or more Elements     History from : Patient    Limitations to history : None    Deyvi Esquivel Sr. is a 81 y.o. male who presents to the ed for chronic low back pain. Patient states that he has a history of chronic low back pain he is currently in pain management. Patient states that he is having an exacerbation of his pain. Patient states that he has pain in his low back and into his buttocks. He denies any loss of bowel or bladder function numbness tingling or weakness to his bilateral lower extremities. Patient denies any abdominal pain nausea vomiting chest pain shortness of breath. Patient states that he takes Norco tens Lyrica Requip and Tylenol as needed. Patient states that he does follow with a neurosurgeon as well as pain management he will call them in the morning. Patient states that a shot of Toradol and a dose of muscle relaxer helped his symptoms every time that this happens. Patient states that this feels like his normal pain. Nursing Notes were all reviewed and agreed with or any disagreements were addressed in the HPI. REVIEW OF SYSTEMS :      Review of Systems   All other systems reviewed and are negative. Positives and Pertinent negatives as per HPI.      SURGICAL HISTORY     Past Surgical History:   Procedure Laterality Date    BACK SURGERY      CARPAL TUNNEL RELEASE      HIP SURGERY Right     KNEE SURGERY      OTHER SURGICAL HISTORY

## 2023-08-21 ENCOUNTER — HOSPITAL ENCOUNTER (EMERGENCY)
Age: 73
Discharge: HOME | End: 2023-08-21
Payer: MEDICARE

## 2023-08-21 ENCOUNTER — APPOINTMENT (OUTPATIENT)
Dept: GENERAL RADIOLOGY | Age: 73
End: 2023-08-21
Payer: MEDICARE

## 2023-08-21 VITALS
OXYGEN SATURATION: 96 % | HEART RATE: 73 BPM | DIASTOLIC BLOOD PRESSURE: 73 MMHG | TEMPERATURE: 98 F | SYSTOLIC BLOOD PRESSURE: 138 MMHG | RESPIRATION RATE: 18 BRPM

## 2023-08-21 DIAGNOSIS — M25.552 BILATERAL HIP PAIN: Primary | ICD-10-CM
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
## 2023-08-21 DIAGNOSIS — M25.551 BILATERAL HIP PAIN: Primary | ICD-10-CM

## 2023-08-21 PROCEDURE — 6360000002 HC RX W HCPCS: Performed by: NURSE PRACTITIONER

## 2023-08-21 PROCEDURE — 99284 EMERGENCY DEPT VISIT MOD MDM: CPT

## 2023-08-21 PROCEDURE — 73521 X-RAY EXAM HIPS BI 2 VIEWS: CPT

## 2023-08-21 PROCEDURE — 96372 THER/PROPH/DIAG INJ SC/IM: CPT

## 2023-08-21 PROCEDURE — 6370000000 HC RX 637 (ALT 250 FOR IP): Performed by: NURSE PRACTITIONER

## 2023-08-21 RX ORDER — LIDOCAINE 50 MG/G
1 PATCH TOPICAL DAILY
Qty: 10 PATCH | Refills: 0 | Status: SHIPPED | OUTPATIENT
Start: 2023-08-21 | End: 2023-08-31

## 2023-08-21 RX ORDER — DEXAMETHASONE SODIUM PHOSPHATE 10 MG/ML
8 INJECTION INTRAMUSCULAR; INTRAVENOUS ONCE
Status: COMPLETED | OUTPATIENT
Start: 2023-08-21 | End: 2023-08-21

## 2023-08-21 RX ORDER — KETOROLAC TROMETHAMINE 30 MG/ML
30 INJECTION, SOLUTION INTRAMUSCULAR; INTRAVENOUS ONCE
Status: COMPLETED | OUTPATIENT
Start: 2023-08-21 | End: 2023-08-21

## 2023-08-21 RX ORDER — HYDROMORPHONE HYDROCHLORIDE 1 MG/ML
0.5 INJECTION, SOLUTION INTRAMUSCULAR; INTRAVENOUS; SUBCUTANEOUS ONCE
Status: COMPLETED | OUTPATIENT
Start: 2023-08-21 | End: 2023-08-21

## 2023-08-21 RX ORDER — LIDOCAINE 4 G/G
1 PATCH TOPICAL DAILY
Status: DISCONTINUED | OUTPATIENT
Start: 2023-08-21 | End: 2023-08-22 | Stop reason: HOSPADM

## 2023-08-21 RX ADMIN — KETOROLAC TROMETHAMINE 30 MG: 30 INJECTION, SOLUTION INTRAMUSCULAR at 16:45

## 2023-08-21 RX ADMIN — HYDROMORPHONE HYDROCHLORIDE 0.5 MG: 1 INJECTION, SOLUTION INTRAMUSCULAR; INTRAVENOUS; SUBCUTANEOUS at 16:46

## 2023-08-21 RX ADMIN — DEXAMETHASONE SODIUM PHOSPHATE 8 MG: 10 INJECTION INTRAMUSCULAR; INTRAVENOUS at 16:45

## 2023-08-21 ASSESSMENT — PAIN SCALES - GENERAL
PAINLEVEL_OUTOF10: 10
PAINLEVEL_OUTOF10: 10

## 2023-08-21 ASSESSMENT — PAIN DESCRIPTION - LOCATION: LOCATION: HIP

## 2023-08-21 ASSESSMENT — PAIN DESCRIPTION - DESCRIPTORS: DESCRIPTORS: DISCOMFORT;STABBING;ACHING

## 2023-08-21 ASSESSMENT — PAIN - FUNCTIONAL ASSESSMENT: PAIN_FUNCTIONAL_ASSESSMENT: 0-10

## 2023-08-21 ASSESSMENT — PAIN DESCRIPTION - ORIENTATION: ORIENTATION: LEFT

## 2023-09-23 ENCOUNTER — HOSPITAL ENCOUNTER (EMERGENCY)
Age: 73
Discharge: HOME | End: 2023-09-23
Payer: MEDICARE

## 2023-09-23 VITALS
DIASTOLIC BLOOD PRESSURE: 83 MMHG | BODY MASS INDEX: 38.4 KG/M2 | SYSTOLIC BLOOD PRESSURE: 150 MMHG | WEIGHT: 260 LBS | TEMPERATURE: 97.7 F | RESPIRATION RATE: 20 BRPM | OXYGEN SATURATION: 97 % | HEART RATE: 86 BPM

## 2023-09-23 DIAGNOSIS — Z76.0 ENCOUNTER FOR MEDICATION REFILL: Primary | ICD-10-CM

## 2023-09-23 PROCEDURE — 99283 EMERGENCY DEPT VISIT LOW MDM: CPT

## 2023-09-23 RX ORDER — HYDROCODONE BITARTRATE AND ACETAMINOPHEN 10; 325 MG/1; MG/1
1 TABLET ORAL EVERY 6 HOURS PRN
Qty: 28 TABLET | Refills: 0 | Status: SHIPPED | OUTPATIENT
Start: 2023-09-23 | End: 2023-09-30

## 2023-09-23 ASSESSMENT — PAIN DESCRIPTION - ONSET: ONSET: ON-GOING

## 2023-09-23 ASSESSMENT — PAIN DESCRIPTION - ORIENTATION: ORIENTATION: LEFT;RIGHT;LOWER

## 2023-09-23 ASSESSMENT — PAIN DESCRIPTION - LOCATION: LOCATION: BACK;LEG

## 2023-09-23 ASSESSMENT — PAIN DESCRIPTION - FREQUENCY: FREQUENCY: CONTINUOUS

## 2023-09-23 ASSESSMENT — PAIN SCALES - GENERAL: PAINLEVEL_OUTOF10: 10

## 2023-09-23 ASSESSMENT — PAIN DESCRIPTION - PAIN TYPE: TYPE: CHRONIC PAIN

## 2023-09-23 ASSESSMENT — PAIN DESCRIPTION - DESCRIPTORS: DESCRIPTORS: DISCOMFORT;BURNING;THROBBING

## 2023-09-24 ENCOUNTER — HOSPITAL ENCOUNTER (EMERGENCY)
Age: 73
Discharge: HOME | End: 2023-09-24
Attending: EMERGENCY MEDICINE
Payer: MEDICARE

## 2023-09-24 VITALS
RESPIRATION RATE: 24 BRPM | SYSTOLIC BLOOD PRESSURE: 100 MMHG | DIASTOLIC BLOOD PRESSURE: 57 MMHG | OXYGEN SATURATION: 94 % | BODY MASS INDEX: 40.85 KG/M2 | WEIGHT: 276.6 LBS | TEMPERATURE: 97.5 F | HEART RATE: 72 BPM

## 2023-09-24 DIAGNOSIS — M54.41 LOW BACK PAIN WITH RIGHT-SIDED SCIATICA, UNSPECIFIED BACK PAIN LATERALITY, UNSPECIFIED CHRONICITY: ICD-10-CM

## 2023-09-24 DIAGNOSIS — M54.31 BILATERAL SCIATICA: Primary | ICD-10-CM

## 2023-09-24 DIAGNOSIS — M54.32 BILATERAL SCIATICA: Primary | ICD-10-CM

## 2023-09-24 PROCEDURE — 6370000000 HC RX 637 (ALT 250 FOR IP): Performed by: EMERGENCY MEDICINE

## 2023-09-24 PROCEDURE — 99283 EMERGENCY DEPT VISIT LOW MDM: CPT

## 2023-09-24 RX ORDER — TRAMADOL HYDROCHLORIDE 50 MG/1
50 TABLET ORAL ONCE
Status: COMPLETED | OUTPATIENT
Start: 2023-09-24 | End: 2023-09-24

## 2023-09-24 RX ORDER — TRAMADOL HYDROCHLORIDE 50 MG/1
50 TABLET ORAL EVERY 6 HOURS PRN
Qty: 6 TABLET | Refills: 0 | Status: SHIPPED | OUTPATIENT
Start: 2023-09-24 | End: 2023-09-27

## 2023-09-24 RX ADMIN — TRAMADOL HYDROCHLORIDE 50 MG: 50 TABLET ORAL at 03:10

## 2023-09-24 ASSESSMENT — PAIN DESCRIPTION - ORIENTATION
ORIENTATION: MID
ORIENTATION: RIGHT;LEFT

## 2023-09-24 ASSESSMENT — PAIN DESCRIPTION - FREQUENCY: FREQUENCY: CONTINUOUS

## 2023-09-24 ASSESSMENT — LIFESTYLE VARIABLES
HOW MANY STANDARD DRINKS CONTAINING ALCOHOL DO YOU HAVE ON A TYPICAL DAY: PATIENT DOES NOT DRINK
HOW OFTEN DO YOU HAVE A DRINK CONTAINING ALCOHOL: NEVER

## 2023-09-24 ASSESSMENT — PAIN SCALES - GENERAL
PAINLEVEL_OUTOF10: 10
PAINLEVEL_OUTOF10: 10

## 2023-09-24 ASSESSMENT — PATIENT HEALTH QUESTIONNAIRE - PHQ9
SUM OF ALL RESPONSES TO PHQ QUESTIONS 1-9: 0
SUM OF ALL RESPONSES TO PHQ QUESTIONS 1-9: 0
SUM OF ALL RESPONSES TO PHQ9 QUESTIONS 1 & 2: 0
SUM OF ALL RESPONSES TO PHQ QUESTIONS 1-9: 0
SUM OF ALL RESPONSES TO PHQ QUESTIONS 1-9: 0
1. LITTLE INTEREST OR PLEASURE IN DOING THINGS: 0
2. FEELING DOWN, DEPRESSED OR HOPELESS: 0

## 2023-09-24 ASSESSMENT — PAIN DESCRIPTION - DESCRIPTORS
DESCRIPTORS: ACHING
DESCRIPTORS: SHOOTING;SHARP

## 2023-09-24 ASSESSMENT — PAIN DESCRIPTION - ONSET: ONSET: ON-GOING

## 2023-09-24 ASSESSMENT — PAIN - FUNCTIONAL ASSESSMENT
PAIN_FUNCTIONAL_ASSESSMENT: INTOLERABLE, UNABLE TO DO ANY ACTIVE OR PASSIVE ACTIVITIES
PAIN_FUNCTIONAL_ASSESSMENT: 0-10

## 2023-09-24 ASSESSMENT — PAIN DESCRIPTION - LOCATION
LOCATION: BACK
LOCATION: HIP

## 2023-09-24 ASSESSMENT — PAIN DESCRIPTION - PAIN TYPE: TYPE: ACUTE PAIN

## 2023-09-24 NOTE — ED PROVIDER NOTES
HPI:  9/24/23,   Time: 3:12 AM EDT         Lexx Diego Sr. is a 72 y.o. male presenting to the ED for chronic sciatica, beginning 20 years ago. The complaint has been persistent, moderate in severity, and worsened by nothing. Patient said no recent trauma he has no abdominal pain he does not feel weak or lightheaded his blood pressure is somewhat low but he does take blood pressure medications pretty frequently told to hold it until he talks to his doctor we will get a standing blood pressure and heart rate to make sure that his blood pressure is not hypotensive though I did tell him to hold his blood pressure medication please note he has had no chest pain or shortness of breath no abdominal pain no flank pain has been urinating normally normal bowel movements and once again this is not a new problem this has been going on for 20 years he took a muscle relaxer to I did tell him get regular prescription for Ultram because he could not get his [place] prescription though I did tell him he needs to speak to his pain management doctor whether this is acceptable for him to get more pain medication I am only getting give him 6 he has an appointment on Monday and 1 person Ultram here once again I told to hold his blood pressure medication he is his physical exam is otherwise unremarkable he has no bowel or bladder incontinence or no new neurodeficits his pulses are equal bilateral as well    ROS:   Pertinent positives and negatives are stated within HPI, all other systems reviewed and are negative.  --------------------------------------------- PAST HISTORY ---------------------------------------------  Past Medical History:  has a past medical history of Anxiety, Back pain, Diabetes mellitus ([address]), Hyperlipidemia, Hypertension, and Sciatica. Past Surgical History:  has a past surgical history that includes back surgery; knee surgery; other surgical history (5/8/2014);  Carpal tunnel release; and hip surgery

## 2024-01-24 ENCOUNTER — HOSPITAL ENCOUNTER (EMERGENCY)
Age: 74
Discharge: HOME | End: 2024-01-24
Attending: EMERGENCY MEDICINE
Payer: MEDICARE

## 2024-01-24 VITALS
TEMPERATURE: 97.8 F | DIASTOLIC BLOOD PRESSURE: 66 MMHG | SYSTOLIC BLOOD PRESSURE: 135 MMHG | OXYGEN SATURATION: 96 % | HEART RATE: 64 BPM | RESPIRATION RATE: 20 BRPM

## 2024-01-24 DIAGNOSIS — M54.41 ACUTE RIGHT-SIDED BACK PAIN WITH SCIATICA: Primary | ICD-10-CM

## 2024-01-24 PROCEDURE — 99284 EMERGENCY DEPT VISIT MOD MDM: CPT

## 2024-01-24 PROCEDURE — 6370000000 HC RX 637 (ALT 250 FOR IP): Performed by: EMERGENCY MEDICINE

## 2024-01-24 PROCEDURE — 96372 THER/PROPH/DIAG INJ SC/IM: CPT

## 2024-01-24 PROCEDURE — 6360000002 HC RX W HCPCS: Performed by: EMERGENCY MEDICINE

## 2024-01-24 RX ORDER — METHOCARBAMOL 500 MG/1
500 TABLET, FILM COATED ORAL 4 TIMES DAILY PRN
Qty: 20 TABLET | Refills: 0 | Status: SHIPPED | OUTPATIENT
Start: 2024-01-24 | End: 2024-01-24

## 2024-01-24 RX ORDER — LIDOCAINE 4 G/G
1 PATCH TOPICAL EVERY 24 HOURS
Qty: 30 PATCH | Refills: 0 | Status: SHIPPED | OUTPATIENT
Start: 2024-01-24 | End: 2024-02-23

## 2024-01-24 RX ORDER — CYCLOBENZAPRINE HCL 10 MG
10 TABLET ORAL ONCE
Status: COMPLETED | OUTPATIENT
Start: 2024-01-24 | End: 2024-01-24

## 2024-01-24 RX ORDER — KETOROLAC TROMETHAMINE 30 MG/ML
30 INJECTION, SOLUTION INTRAMUSCULAR; INTRAVENOUS ONCE
Status: COMPLETED | OUTPATIENT
Start: 2024-01-24 | End: 2024-01-24

## 2024-01-24 RX ORDER — HYDROCODONE BITARTRATE AND ACETAMINOPHEN 5; 325 MG/1; MG/1
2 TABLET ORAL ONCE
Status: COMPLETED | OUTPATIENT
Start: 2024-01-24 | End: 2024-01-24

## 2024-01-24 RX ORDER — NAPROXEN 500 MG/1
500 TABLET ORAL 2 TIMES DAILY PRN
Qty: 20 TABLET | Refills: 0 | Status: SHIPPED | OUTPATIENT
Start: 2024-01-24 | End: 2024-02-03

## 2024-01-24 RX ADMIN — CYCLOBENZAPRINE 10 MG: 10 TABLET, FILM COATED ORAL at 06:29

## 2024-01-24 RX ADMIN — HYDROCODONE BITARTRATE AND ACETAMINOPHEN 2 TABLET: 5; 325 TABLET ORAL at 06:30

## 2024-01-24 RX ADMIN — KETOROLAC TROMETHAMINE 30 MG: 30 INJECTION, SOLUTION INTRAMUSCULAR; INTRAVENOUS at 06:30

## 2024-01-24 ASSESSMENT — PAIN - FUNCTIONAL ASSESSMENT
PAIN_FUNCTIONAL_ASSESSMENT: 0-10
PAIN_FUNCTIONAL_ASSESSMENT: PREVENTS OR INTERFERES SOME ACTIVE ACTIVITIES AND ADLS
PAIN_FUNCTIONAL_ASSESSMENT: 0-10

## 2024-01-24 ASSESSMENT — PAIN DESCRIPTION - FREQUENCY: FREQUENCY: CONTINUOUS

## 2024-01-24 ASSESSMENT — PAIN DESCRIPTION - ORIENTATION
ORIENTATION: RIGHT;LEFT;LOWER;MID
ORIENTATION: LEFT;RIGHT

## 2024-01-24 ASSESSMENT — PAIN SCALES - GENERAL
PAINLEVEL_OUTOF10: 10
PAINLEVEL_OUTOF10: 5
PAINLEVEL_OUTOF10: 8

## 2024-01-24 ASSESSMENT — PAIN DESCRIPTION - ONSET: ONSET: ON-GOING

## 2024-01-24 ASSESSMENT — PAIN DESCRIPTION - LOCATION
LOCATION: BACK;GROIN;LEG
LOCATION: BACK;LEG

## 2024-01-24 ASSESSMENT — LIFESTYLE VARIABLES: HOW OFTEN DO YOU HAVE A DRINK CONTAINING ALCOHOL: NEVER

## 2024-01-24 ASSESSMENT — PAIN DESCRIPTION - DESCRIPTORS
DESCRIPTORS: ACHING;SORE;SHOOTING;SHARP
DESCRIPTORS: ACHING;SHARP;SHOOTING;SORE

## 2024-01-24 ASSESSMENT — PAIN DESCRIPTION - PAIN TYPE: TYPE: ACUTE PAIN

## 2024-01-24 NOTE — ED PROVIDER NOTES
HPI:  1/24/24,   Time: 6:19 AM BRANDY Justin Sr. is a 73 y.o. male presenting to the ED for right lower back pain/sciatica, beginning 3 days ago.  The complaint has been persistent, mild in severity, and worsened by movement of back.  Brought in by private vehicle.  History of chronic back issues.  No incontinence or saddle esthesia.  No new falls.  No fever/chill/sweats.    Review of Systems:   Pertinent positives and negatives are stated within HPI, all other systems reviewed and are negative.          --------------------------------------------- PAST HISTORY ---------------------------------------------  Past Medical History:  has a past medical history of Anxiety, Back pain, Diabetes mellitus (HCC), Hyperlipidemia, Hypertension, and Sciatica.    Past Surgical History:  has a past surgical history that includes back surgery; knee surgery; other surgical history (5/8/2014); Carpal tunnel release; and hip surgery (Right).    Social History:  reports that he quit smoking about 10 years ago. His smoking use included cigarettes. He started smoking about 30 years ago. He has a 20.0 pack-year smoking history. He has never used smokeless tobacco. He reports that he does not drink alcohol and does not use drugs.    Family History: family history is not on file.     The patient’s home medications have been reviewed.    Allergies: Patient has no known allergies.        ---------------------------------------------------PHYSICAL EXAM--------------------------------------    Constitutional/General: Alert and oriented x3, in pain  Head: Normocephalic and atraumatic  Eyes: PERRL, EOMI, conjunctive normal, sclera non icteric  Mouth: Oropharynx clear, handling secretions,   Neck: Supple, full ROM,   Respiratory:  Not in respiratory distress  Cardiovascular:  Regular rate. Regular rhythm. . 2+ distal pulses  .   Musculoskeletal: Moves all extremities x 4. Warm and well perfused, right lower back pain with paraspinal

## 2024-04-17 ENCOUNTER — APPOINTMENT (OUTPATIENT)
Dept: CT IMAGING | Age: 74
End: 2024-04-17
Payer: MEDICARE

## 2024-04-17 ENCOUNTER — HOSPITAL ENCOUNTER (EMERGENCY)
Age: 74
Discharge: HOME | End: 2024-04-17
Attending: EMERGENCY MEDICINE
Payer: MEDICARE

## 2024-04-17 VITALS
TEMPERATURE: 97.5 F | HEIGHT: 69 IN | OXYGEN SATURATION: 94 % | DIASTOLIC BLOOD PRESSURE: 68 MMHG | BODY MASS INDEX: 41.03 KG/M2 | RESPIRATION RATE: 18 BRPM | HEART RATE: 75 BPM | WEIGHT: 277 LBS | SYSTOLIC BLOOD PRESSURE: 123 MMHG

## 2024-04-17 DIAGNOSIS — R42 DIZZINESS: ICD-10-CM

## 2024-04-17 DIAGNOSIS — M54.41 CHRONIC BILATERAL LOW BACK PAIN WITH BILATERAL SCIATICA: Primary | ICD-10-CM

## 2024-04-17 DIAGNOSIS — M54.42 CHRONIC BILATERAL LOW BACK PAIN WITH BILATERAL SCIATICA: Primary | ICD-10-CM

## 2024-04-17 DIAGNOSIS — G89.29 CHRONIC BILATERAL LOW BACK PAIN WITH BILATERAL SCIATICA: Primary | ICD-10-CM

## 2024-04-17 DIAGNOSIS — D64.9 ANEMIA, UNSPECIFIED TYPE: ICD-10-CM

## 2024-04-17 LAB
ALBUMIN SERPL-MCNC: 4.3 G/DL (ref 3.5–5.2)
ALP SERPL-CCNC: 65 U/L (ref 40–129)
ALT SERPL-CCNC: 42 U/L (ref 0–40)
ANION GAP SERPL CALCULATED.3IONS-SCNC: 12 MMOL/L (ref 7–16)
AST SERPL-CCNC: 46 U/L (ref 0–39)
BASOPHILS # BLD: 0.05 K/UL (ref 0–0.2)
BASOPHILS NFR BLD: 1 % (ref 0–2)
BILIRUB SERPL-MCNC: 0.6 MG/DL (ref 0–1.2)
BUN SERPL-MCNC: 24 MG/DL (ref 6–23)
CALCIUM SERPL-MCNC: 9.7 MG/DL (ref 8.6–10.2)
CHLORIDE SERPL-SCNC: 97 MMOL/L (ref 98–107)
CO2 SERPL-SCNC: 30 MMOL/L (ref 22–29)
CREAT SERPL-MCNC: 1.3 MG/DL (ref 0.7–1.2)
EKG ATRIAL RATE: 72 BPM
EKG P AXIS: 29 DEGREES
EKG P-R INTERVAL: 190 MS
EKG Q-T INTERVAL: 386 MS
EKG QRS DURATION: 94 MS
EKG QTC CALCULATION (BAZETT): 422 MS
EKG R AXIS: -26 DEGREES
EKG T AXIS: 31 DEGREES
EKG VENTRICULAR RATE: 72 BPM
EOSINOPHIL # BLD: 0.18 K/UL (ref 0.05–0.5)
EOSINOPHILS RELATIVE PERCENT: 2 % (ref 0–6)
ERYTHROCYTE [DISTWIDTH] IN BLOOD BY AUTOMATED COUNT: 14.2 % (ref 11.5–15)
GFR SERPL CREATININE-BSD FRML MDRD: 58 ML/MIN/1.73M2
GLUCOSE SERPL-MCNC: 130 MG/DL (ref 74–99)
HCT VFR BLD AUTO: 33.7 % (ref 37–54)
HGB BLD-MCNC: 10.8 G/DL (ref 12.5–16.5)
IMM GRANULOCYTES # BLD AUTO: 0.07 K/UL (ref 0–0.58)
IMM GRANULOCYTES NFR BLD: 1 % (ref 0–5)
LYMPHOCYTES NFR BLD: 1.87 K/UL (ref 1.5–4)
LYMPHOCYTES RELATIVE PERCENT: 24 % (ref 20–42)
MAGNESIUM SERPL-MCNC: 1.4 MG/DL (ref 1.6–2.6)
MCH RBC QN AUTO: 27.3 PG (ref 26–35)
MCHC RBC AUTO-ENTMCNC: 32 G/DL (ref 32–34.5)
MCV RBC AUTO: 85.1 FL (ref 80–99.9)
MONOCYTES NFR BLD: 0.9 K/UL (ref 0.1–0.95)
MONOCYTES NFR BLD: 12 % (ref 2–12)
NEUTROPHILS NFR BLD: 61 % (ref 43–80)
NEUTS SEG NFR BLD: 4.69 K/UL (ref 1.8–7.3)
PLATELET # BLD AUTO: 223 K/UL (ref 130–450)
PMV BLD AUTO: 10.5 FL (ref 7–12)
POTASSIUM SERPL-SCNC: 4.8 MMOL/L (ref 3.5–5)
PROT SERPL-MCNC: 7.4 G/DL (ref 6.4–8.3)
RBC # BLD AUTO: 3.96 M/UL (ref 3.8–5.8)
SODIUM SERPL-SCNC: 139 MMOL/L (ref 132–146)
TROPONIN I SERPL HS-MCNC: 19 NG/L (ref 0–11)
TROPONIN I SERPL HS-MCNC: 22 NG/L (ref 0–11)
WBC OTHER # BLD: 7.8 K/UL (ref 4.5–11.5)

## 2024-04-17 PROCEDURE — 96374 THER/PROPH/DIAG INJ IV PUSH: CPT

## 2024-04-17 PROCEDURE — 93005 ELECTROCARDIOGRAM TRACING: CPT | Performed by: EMERGENCY MEDICINE

## 2024-04-17 PROCEDURE — 6370000000 HC RX 637 (ALT 250 FOR IP): Performed by: EMERGENCY MEDICINE

## 2024-04-17 PROCEDURE — 85025 COMPLETE CBC W/AUTO DIFF WBC: CPT

## 2024-04-17 PROCEDURE — 96372 THER/PROPH/DIAG INJ SC/IM: CPT

## 2024-04-17 PROCEDURE — 70450 CT HEAD/BRAIN W/O DYE: CPT

## 2024-04-17 PROCEDURE — 83735 ASSAY OF MAGNESIUM: CPT

## 2024-04-17 PROCEDURE — 93010 ELECTROCARDIOGRAM REPORT: CPT | Performed by: INTERNAL MEDICINE

## 2024-04-17 PROCEDURE — 84484 ASSAY OF TROPONIN QUANT: CPT

## 2024-04-17 PROCEDURE — 99284 EMERGENCY DEPT VISIT MOD MDM: CPT

## 2024-04-17 PROCEDURE — 80053 COMPREHEN METABOLIC PANEL: CPT

## 2024-04-17 PROCEDURE — 6360000002 HC RX W HCPCS: Performed by: EMERGENCY MEDICINE

## 2024-04-17 RX ORDER — KETOROLAC TROMETHAMINE 30 MG/ML
15 INJECTION, SOLUTION INTRAMUSCULAR; INTRAVENOUS ONCE
Status: COMPLETED | OUTPATIENT
Start: 2024-04-17 | End: 2024-04-17

## 2024-04-17 RX ORDER — KETOROLAC TROMETHAMINE 30 MG/ML
30 INJECTION, SOLUTION INTRAMUSCULAR; INTRAVENOUS ONCE
Status: DISCONTINUED | OUTPATIENT
Start: 2024-04-17 | End: 2024-04-17

## 2024-04-17 RX ORDER — HYDROCODONE BITARTRATE AND ACETAMINOPHEN 5; 325 MG/1; MG/1
2 TABLET ORAL ONCE
Status: COMPLETED | OUTPATIENT
Start: 2024-04-17 | End: 2024-04-17

## 2024-04-17 RX ORDER — CYCLOBENZAPRINE HCL 10 MG
10 TABLET ORAL ONCE
Status: COMPLETED | OUTPATIENT
Start: 2024-04-17 | End: 2024-04-17

## 2024-04-17 RX ORDER — TRIAMCINOLONE ACETONIDE 40 MG/ML
40 INJECTION, SUSPENSION INTRA-ARTICULAR; INTRAMUSCULAR ONCE
Status: COMPLETED | OUTPATIENT
Start: 2024-04-17 | End: 2024-04-17

## 2024-04-17 RX ADMIN — TRIAMCINOLONE ACETONIDE 40 MG: 40 INJECTION, SUSPENSION INTRA-ARTICULAR; INTRAMUSCULAR at 15:01

## 2024-04-17 RX ADMIN — HYDROCODONE BITARTRATE AND ACETAMINOPHEN 2 TABLET: 5; 325 TABLET ORAL at 16:22

## 2024-04-17 RX ADMIN — CYCLOBENZAPRINE 10 MG: 10 TABLET, FILM COATED ORAL at 15:05

## 2024-04-17 RX ADMIN — KETOROLAC TROMETHAMINE 15 MG: 30 INJECTION, SOLUTION INTRAMUSCULAR at 15:02

## 2024-04-17 ASSESSMENT — PAIN DESCRIPTION - ORIENTATION
ORIENTATION: LEFT;RIGHT
ORIENTATION: RIGHT;LEFT
ORIENTATION: RIGHT;LEFT

## 2024-04-17 ASSESSMENT — PAIN DESCRIPTION - ONSET: ONSET: ON-GOING

## 2024-04-17 ASSESSMENT — PAIN DESCRIPTION - LOCATION
LOCATION: HIP
LOCATION: HIP
LOCATION: LEG

## 2024-04-17 ASSESSMENT — PAIN DESCRIPTION - PAIN TYPE: TYPE: ACUTE PAIN

## 2024-04-17 ASSESSMENT — PAIN DESCRIPTION - FREQUENCY: FREQUENCY: CONTINUOUS

## 2024-04-17 ASSESSMENT — PAIN DESCRIPTION - DESCRIPTORS
DESCRIPTORS: THROBBING;SHARP
DESCRIPTORS: ACHING
DESCRIPTORS: DISCOMFORT;ACHING;SHOOTING;SHARP

## 2024-04-17 ASSESSMENT — PAIN SCALES - GENERAL
PAINLEVEL_OUTOF10: 10

## 2024-04-17 NOTE — ED PROVIDER NOTES
[facility] EMERGENCY DEPARTMENT  EMERGENCY DEPARTMENT ENCOUNTER        Pt Name: Javier Justin Sr.  MRN: [id number]  Birthdate 1950  Date of evaluation: 4/17/2024  Provider: Brooke Diallo DO  PCP: Raúl Farias MD  Note Started: 2:36 PM EDT 4/17/24    CHIEF COMPLAINT       Chief Complaint   Patient presents with    Leg Pain     Bilateral back pain with radiation down both legs, also bout with vertigo yesterday       HISTORY OF PRESENT ILLNESS: 1 or more Elements   History From: patient    Limitations to history : None    Javier Justin Sr. is a 73 y.o. male who presents with acute exacerbation of chronic low back pain with sciatica beginning a couple days ago after doing some yard work and walking a long further than he is used to.  Patient has chronic back pain for which he is prescribed regular and routine Lyrica and opiate pain pills.  He states when he was here for his last visit for acute exacerbation of low back pain with sciatica what ever they gave him helped.  He would like a small dose of steroid only to avoid blood sugar issues.  Additionally he reports vertigo/dizzy feeling for 1 to 2 weeks now that was seen and treated as sinusitis with Omnicef at urgent care.  He took an 8-day course of that, finished it 2 days ago and the dizziness came back last night.  He no longer has congestion or fever but does report some sinus pressure and headache as well.  At urgent care he was swabbed for COVID which was negative.  The complaint has been persistent, moderate in severity, and worsened by nothing.  Patient denies fever/chills, sore throat, cough, congestion, chest pain, shortness of breath, edema, visual disturbances, focal paresthesias, focal weakness, abdominal pain, nausea, vomiting, diarrhea, constipation, dysuria, hematuria, trauma, neck pain, rash or other complaints.  He denies focal paresthesias, focal weakness, saddle paresthesias, urinary or stool

## 2024-05-23 ENCOUNTER — HOSPITAL ENCOUNTER (EMERGENCY)
Age: 74
Discharge: HOME | End: 2024-05-23
Attending: STUDENT IN AN ORGANIZED HEALTH CARE EDUCATION/TRAINING PROGRAM
Payer: MEDICARE

## 2024-05-23 VITALS
HEART RATE: 88 BPM | SYSTOLIC BLOOD PRESSURE: 140 MMHG | RESPIRATION RATE: 18 BRPM | TEMPERATURE: 97.9 F | WEIGHT: 271 LBS | HEIGHT: 69 IN | OXYGEN SATURATION: 98 % | DIASTOLIC BLOOD PRESSURE: 82 MMHG | BODY MASS INDEX: 40.14 KG/M2

## 2024-05-23 DIAGNOSIS — M54.31 SCIATICA OF RIGHT SIDE: Primary | ICD-10-CM

## 2024-05-23 PROCEDURE — 6370000000 HC RX 637 (ALT 250 FOR IP): Performed by: STUDENT IN AN ORGANIZED HEALTH CARE EDUCATION/TRAINING PROGRAM

## 2024-05-23 PROCEDURE — 96372 THER/PROPH/DIAG INJ SC/IM: CPT

## 2024-05-23 PROCEDURE — 6360000002 HC RX W HCPCS: Performed by: STUDENT IN AN ORGANIZED HEALTH CARE EDUCATION/TRAINING PROGRAM

## 2024-05-23 PROCEDURE — 99284 EMERGENCY DEPT VISIT MOD MDM: CPT

## 2024-05-23 RX ORDER — KETOROLAC TROMETHAMINE 30 MG/ML
30 INJECTION, SOLUTION INTRAMUSCULAR; INTRAVENOUS ONCE
Status: COMPLETED | OUTPATIENT
Start: 2024-05-23 | End: 2024-05-23

## 2024-05-23 RX ORDER — DIAZEPAM 5 MG/1
5 TABLET ORAL ONCE
Status: COMPLETED | OUTPATIENT
Start: 2024-05-23 | End: 2024-05-23

## 2024-05-23 RX ORDER — DEXAMETHASONE 6 MG/1
6 TABLET ORAL
Qty: 5 TABLET | Refills: 0 | Status: ON HOLD | OUTPATIENT
Start: 2024-05-23 | End: 2024-05-29 | Stop reason: HOSPADM

## 2024-05-23 RX ORDER — HYDROCODONE BITARTRATE AND ACETAMINOPHEN 5; 325 MG/1; MG/1
1 TABLET ORAL ONCE
Status: COMPLETED | OUTPATIENT
Start: 2024-05-23 | End: 2024-05-23

## 2024-05-23 RX ORDER — ORPHENADRINE CITRATE 30 MG/ML
60 INJECTION INTRAMUSCULAR; INTRAVENOUS ONCE
Status: COMPLETED | OUTPATIENT
Start: 2024-05-23 | End: 2024-05-23

## 2024-05-23 RX ORDER — DEXAMETHASONE SODIUM PHOSPHATE 10 MG/ML
10 INJECTION INTRAMUSCULAR; INTRAVENOUS ONCE
Status: COMPLETED | OUTPATIENT
Start: 2024-05-23 | End: 2024-05-23

## 2024-05-23 RX ADMIN — ORPHENADRINE CITRATE 60 MG: 30 INJECTION, SOLUTION INTRAMUSCULAR; INTRAVENOUS at 09:46

## 2024-05-23 RX ADMIN — KETOROLAC TROMETHAMINE 30 MG: 30 INJECTION, SOLUTION INTRAMUSCULAR at 11:50

## 2024-05-23 RX ADMIN — HYDROCODONE BITARTRATE AND ACETAMINOPHEN 1 TABLET: 5; 325 TABLET ORAL at 09:45

## 2024-05-23 RX ADMIN — DIAZEPAM 5 MG: 5 TABLET ORAL at 11:50

## 2024-05-23 RX ADMIN — DEXAMETHASONE SODIUM PHOSPHATE 10 MG: 10 INJECTION INTRAMUSCULAR; INTRAVENOUS at 09:46

## 2024-05-23 ASSESSMENT — PAIN DESCRIPTION - DESCRIPTORS
DESCRIPTORS: ACHING;DISCOMFORT;THROBBING

## 2024-05-23 ASSESSMENT — PAIN DESCRIPTION - ORIENTATION
ORIENTATION: RIGHT

## 2024-05-23 ASSESSMENT — PAIN DESCRIPTION - LOCATION
LOCATION: HIP;BACK
LOCATION: BACK;HIP

## 2024-05-23 ASSESSMENT — PAIN - FUNCTIONAL ASSESSMENT: PAIN_FUNCTIONAL_ASSESSMENT: 0-10

## 2024-05-23 ASSESSMENT — PAIN SCALES - GENERAL
PAINLEVEL_OUTOF10: 10
PAINLEVEL_OUTOF10: 3
PAINLEVEL_OUTOF10: 9
PAINLEVEL_OUTOF10: 5
PAINLEVEL_OUTOF10: 10

## 2024-05-23 NOTE — DISCHARGE INSTRUCTIONS
Take all medication as prescribed  Follow-up with primary care doctor  If you notice any new worrisome symptoms please return to emergency department for evaluation

## 2024-05-23 NOTE — ED PROVIDER NOTES
Patient is a 73-year-old male past medical history of hypertension, hyperlipidemia as well as sciatica.  Patient presents chief complaint of right-sided low back pain and hip pain.  Vital signs stable presentation.  Physical exam heart regular rate and rhythm, lungs are auscultation bilaterally, abdomen soft nontender no rigidity rebound or guarding.  There is tenderness palpation on the right hip and piriformis with radiation down the right leg.  Right lower extremity is neurovascular intact no saddle anesthesia no reported fevers no recent back injections no bed alarm symptoms no urinary overflow incontinence.  Differential diagnosis includes muscle strain, sciatica, spinal stenosis.  Patient have alarm symptoms no need to advanced imaging at this time.  Patient given one 5-325 mg Norco as well as 60 mg of IM Norflex and 10 mg of IM Decadron.  On repeat evaluation patient continues to have some discomfort patient remedicated with 30 mg of IM Toradol as well as 5 mg of p.o. Valium.  On repeat evaluation patient notes significant proved in symptoms.  Findings consistent right-sided sciatica.  Patient is overall well-appearing decision made to discharge patient.  Patient discharged a short course of Decadron.  Patient instructed to follow-up with primary care doctor strict return precautions discussed all questions answered patient agreement plan of care discharged in stable condition.          FINAL IMPRESSION      1. Sciatica of right side          DISPOSITION/PLAN     DISPOSITION Decision To Discharge 05/23/2024 12:44:40 PM    PATIENT REFERRED TO:  Raúl Farias MD  [address] [facility]  [address] [place] [id number] [phone]    Schedule an appointment as soon as possible for a visit       [facility] Emergency Department  [address] [place] [id number] [phone]  Go to   If symptoms worsen      DISCHARGE MEDICATIONS:  Discharge Medication List as of

## 2024-05-27 ENCOUNTER — APPOINTMENT (OUTPATIENT)
Dept: GENERAL RADIOLOGY | Age: 74
End: 2024-05-27
Payer: MEDICARE

## 2024-05-27 ENCOUNTER — HOSPITAL ENCOUNTER (EMERGENCY)
Age: 74
Discharge: LEFT BEFORE DISCHARGE | End: 2024-05-27
Attending: STUDENT IN AN ORGANIZED HEALTH CARE EDUCATION/TRAINING PROGRAM
Payer: MEDICARE

## 2024-05-27 ENCOUNTER — HOSPITAL ENCOUNTER (INPATIENT)
Age: 74
LOS: 2 days | Discharge: HOME | DRG: 311 | End: 2024-05-29
Attending: EMERGENCY MEDICINE | Admitting: INTERNAL MEDICINE
Payer: MEDICARE

## 2024-05-27 ENCOUNTER — APPOINTMENT (OUTPATIENT)
Dept: GENERAL RADIOLOGY | Age: 74
DRG: 311 | End: 2024-05-27
Payer: MEDICARE

## 2024-05-27 ENCOUNTER — APPOINTMENT (OUTPATIENT)
Dept: CT IMAGING | Age: 74
End: 2024-05-27
Payer: MEDICARE

## 2024-05-27 VITALS
RESPIRATION RATE: 16 BRPM | HEART RATE: 63 BPM | DIASTOLIC BLOOD PRESSURE: 82 MMHG | SYSTOLIC BLOOD PRESSURE: 156 MMHG | TEMPERATURE: 98.3 F | OXYGEN SATURATION: 96 %

## 2024-05-27 DIAGNOSIS — R07.9 CHEST PAIN, UNSPECIFIED TYPE: Primary | ICD-10-CM

## 2024-05-27 DIAGNOSIS — I20.9 ANGINA PECTORIS (HCC): ICD-10-CM

## 2024-05-27 LAB
ALBUMIN SERPL-MCNC: 4.3 G/DL (ref 3.5–5.2)
ALBUMIN SERPL-MCNC: 4.6 G/DL (ref 3.5–5.2)
ALP SERPL-CCNC: 63 U/L (ref 40–129)
ALP SERPL-CCNC: 64 U/L (ref 40–129)
ALT SERPL-CCNC: 41 U/L (ref 0–40)
ALT SERPL-CCNC: 48 U/L (ref 0–40)
ANION GAP SERPL CALCULATED.3IONS-SCNC: 16 MMOL/L (ref 7–16)
ANION GAP SERPL CALCULATED.3IONS-SCNC: 19 MMOL/L (ref 7–16)
AST SERPL-CCNC: 44 U/L (ref 0–39)
AST SERPL-CCNC: 51 U/L (ref 0–39)
BASOPHILS # BLD: 0.02 K/UL (ref 0–0.2)
BASOPHILS # BLD: 0.03 K/UL (ref 0–0.2)
BASOPHILS NFR BLD: 0 % (ref 0–2)
BASOPHILS NFR BLD: 0 % (ref 0–2)
BILIRUB SERPL-MCNC: 0.6 MG/DL (ref 0–1.2)
BILIRUB SERPL-MCNC: 0.8 MG/DL (ref 0–1.2)
BNP SERPL-MCNC: 673 PG/ML (ref 0–125)
BUN SERPL-MCNC: 31 MG/DL (ref 6–23)
BUN SERPL-MCNC: 33 MG/DL (ref 6–23)
CALCIUM SERPL-MCNC: 9.6 MG/DL (ref 8.6–10.2)
CALCIUM SERPL-MCNC: 9.8 MG/DL (ref 8.6–10.2)
CHLORIDE SERPL-SCNC: 94 MMOL/L (ref 98–107)
CHLORIDE SERPL-SCNC: 95 MMOL/L (ref 98–107)
CO2 SERPL-SCNC: 23 MMOL/L (ref 22–29)
CO2 SERPL-SCNC: 25 MMOL/L (ref 22–29)
CREAT SERPL-MCNC: 1.1 MG/DL (ref 0.7–1.2)
CREAT SERPL-MCNC: 1.1 MG/DL (ref 0.7–1.2)
D-DIMER QUANTITATIVE: 229 NG/ML DDU (ref 0–230)
EOSINOPHIL # BLD: 0.01 K/UL (ref 0.05–0.5)
EOSINOPHIL # BLD: 0.04 K/UL (ref 0.05–0.5)
EOSINOPHILS RELATIVE PERCENT: 0 % (ref 0–6)
EOSINOPHILS RELATIVE PERCENT: 0 % (ref 0–6)
ERYTHROCYTE [DISTWIDTH] IN BLOOD BY AUTOMATED COUNT: 15.3 % (ref 11.5–15)
ERYTHROCYTE [DISTWIDTH] IN BLOOD BY AUTOMATED COUNT: 15.3 % (ref 11.5–15)
GFR, ESTIMATED: 73 ML/MIN/1.73M2
GFR, ESTIMATED: 73 ML/MIN/1.73M2
GLUCOSE BLD-MCNC: 185 MG/DL (ref 74–99)
GLUCOSE SERPL-MCNC: 135 MG/DL (ref 74–99)
GLUCOSE SERPL-MCNC: 169 MG/DL (ref 74–99)
HCT VFR BLD AUTO: 37.1 % (ref 37–54)
HCT VFR BLD AUTO: 37.9 % (ref 37–54)
HGB BLD-MCNC: 12.2 G/DL (ref 12.5–16.5)
HGB BLD-MCNC: 12.3 G/DL (ref 12.5–16.5)
IMM GRANULOCYTES # BLD AUTO: 0.31 K/UL (ref 0–0.58)
IMM GRANULOCYTES # BLD AUTO: 0.36 K/UL (ref 0–0.58)
IMM GRANULOCYTES NFR BLD: 2 % (ref 0–5)
IMM GRANULOCYTES NFR BLD: 3 % (ref 0–5)
LYMPHOCYTES NFR BLD: 2.76 K/UL (ref 1.5–4)
LYMPHOCYTES NFR BLD: 3.56 K/UL (ref 1.5–4)
LYMPHOCYTES RELATIVE PERCENT: 18 % (ref 20–42)
LYMPHOCYTES RELATIVE PERCENT: 24 % (ref 20–42)
MCH RBC QN AUTO: 27.7 PG (ref 26–35)
MCH RBC QN AUTO: 28.1 PG (ref 26–35)
MCHC RBC AUTO-ENTMCNC: 32.2 G/DL (ref 32–34.5)
MCHC RBC AUTO-ENTMCNC: 33.2 G/DL (ref 32–34.5)
MCV RBC AUTO: 84.7 FL (ref 80–99.9)
MCV RBC AUTO: 86.1 FL (ref 80–99.9)
MONOCYTES NFR BLD: 1.37 K/UL (ref 0.1–0.95)
MONOCYTES NFR BLD: 1.48 K/UL (ref 0.1–0.95)
MONOCYTES NFR BLD: 10 % (ref 2–12)
MONOCYTES NFR BLD: 9 % (ref 2–12)
NEUTROPHILS NFR BLD: 63 % (ref 43–80)
NEUTROPHILS NFR BLD: 70 % (ref 43–80)
NEUTS SEG NFR BLD: 10.79 K/UL (ref 1.8–7.3)
NEUTS SEG NFR BLD: 9.23 K/UL (ref 1.8–7.3)
PLATELET # BLD AUTO: 223 K/UL (ref 130–450)
PLATELET # BLD AUTO: 250 K/UL (ref 130–450)
PMV BLD AUTO: 10.8 FL (ref 7–12)
PMV BLD AUTO: 11.1 FL (ref 7–12)
POTASSIUM SERPL-SCNC: 4.5 MMOL/L (ref 3.5–5)
POTASSIUM SERPL-SCNC: 5.3 MMOL/L (ref 3.5–5)
PROT SERPL-MCNC: 7.4 G/DL (ref 6.4–8.3)
PROT SERPL-MCNC: 7.6 G/DL (ref 6.4–8.3)
RBC # BLD AUTO: 4.38 M/UL (ref 3.8–5.8)
RBC # BLD AUTO: 4.4 M/UL (ref 3.8–5.8)
SODIUM SERPL-SCNC: 136 MMOL/L (ref 132–146)
SODIUM SERPL-SCNC: 136 MMOL/L (ref 132–146)
TROPONIN I SERPL HS-MCNC: 18 NG/L (ref 0–11)
TROPONIN I SERPL HS-MCNC: 19 NG/L (ref 0–11)
TROPONIN I SERPL HS-MCNC: 24 NG/L (ref 0–11)
TROPONIN I SERPL HS-MCNC: 25 NG/L (ref 0–11)
WBC OTHER # BLD: 14.6 K/UL (ref 4.5–11.5)
WBC OTHER # BLD: 15.4 K/UL (ref 4.5–11.5)

## 2024-05-27 PROCEDURE — 70450 CT HEAD/BRAIN W/O DYE: CPT

## 2024-05-27 PROCEDURE — 85025 COMPLETE CBC W/AUTO DIFF WBC: CPT

## 2024-05-27 PROCEDURE — 84484 ASSAY OF TROPONIN QUANT: CPT

## 2024-05-27 PROCEDURE — 71046 X-RAY EXAM CHEST 2 VIEWS: CPT

## 2024-05-27 PROCEDURE — 93005 ELECTROCARDIOGRAM TRACING: CPT

## 2024-05-27 PROCEDURE — 80053 COMPREHEN METABOLIC PANEL: CPT

## 2024-05-27 PROCEDURE — 82962 GLUCOSE BLOOD TEST: CPT

## 2024-05-27 PROCEDURE — 6370000000 HC RX 637 (ALT 250 FOR IP): Performed by: STUDENT IN AN ORGANIZED HEALTH CARE EDUCATION/TRAINING PROGRAM

## 2024-05-27 PROCEDURE — 99285 EMERGENCY DEPT VISIT HI MDM: CPT

## 2024-05-27 PROCEDURE — 83880 ASSAY OF NATRIURETIC PEPTIDE: CPT

## 2024-05-27 PROCEDURE — 71045 X-RAY EXAM CHEST 1 VIEW: CPT

## 2024-05-27 PROCEDURE — 73564 X-RAY EXAM KNEE 4 OR MORE: CPT

## 2024-05-27 PROCEDURE — 2060000000 HC ICU INTERMEDIATE R&B

## 2024-05-27 PROCEDURE — 85379 FIBRIN DEGRADATION QUANT: CPT

## 2024-05-27 PROCEDURE — 6370000000 HC RX 637 (ALT 250 FOR IP)

## 2024-05-27 PROCEDURE — 36415 COLL VENOUS BLD VENIPUNCTURE: CPT

## 2024-05-27 RX ORDER — POTASSIUM CHLORIDE 20 MEQ/1
40 TABLET, EXTENDED RELEASE ORAL PRN
Status: DISCONTINUED | OUTPATIENT
Start: 2024-05-27 | End: 2024-05-29 | Stop reason: HOSPADM

## 2024-05-27 RX ORDER — ONDANSETRON 2 MG/ML
4 INJECTION INTRAMUSCULAR; INTRAVENOUS EVERY 6 HOURS PRN
Status: DISCONTINUED | OUTPATIENT
Start: 2024-05-27 | End: 2024-05-29 | Stop reason: HOSPADM

## 2024-05-27 RX ORDER — ACETAMINOPHEN 650 MG/1
650 SUPPOSITORY RECTAL EVERY 6 HOURS PRN
Status: DISCONTINUED | OUTPATIENT
Start: 2024-05-27 | End: 2024-05-29 | Stop reason: HOSPADM

## 2024-05-27 RX ORDER — SODIUM CHLORIDE 0.9 % (FLUSH) 0.9 %
10 SYRINGE (ML) INJECTION PRN
Status: DISCONTINUED | OUTPATIENT
Start: 2024-05-27 | End: 2024-05-29 | Stop reason: HOSPADM

## 2024-05-27 RX ORDER — ACETAMINOPHEN 325 MG/1
650 TABLET ORAL EVERY 6 HOURS PRN
Status: DISCONTINUED | OUTPATIENT
Start: 2024-05-27 | End: 2024-05-29 | Stop reason: HOSPADM

## 2024-05-27 RX ORDER — POTASSIUM CHLORIDE 7.45 MG/ML
10 INJECTION INTRAVENOUS PRN
Status: DISCONTINUED | OUTPATIENT
Start: 2024-05-27 | End: 2024-05-29 | Stop reason: HOSPADM

## 2024-05-27 RX ORDER — FLUOXETINE HYDROCHLORIDE 20 MG/1
40 CAPSULE ORAL 2 TIMES DAILY
Status: DISCONTINUED | OUTPATIENT
Start: 2024-05-27 | End: 2024-05-29 | Stop reason: HOSPADM

## 2024-05-27 RX ORDER — ALPRAZOLAM 0.25 MG/1
0.5 TABLET ORAL NIGHTLY PRN
Status: DISCONTINUED | OUTPATIENT
Start: 2024-05-27 | End: 2024-05-29 | Stop reason: HOSPADM

## 2024-05-27 RX ORDER — PREGABALIN 75 MG/1
150 CAPSULE ORAL 2 TIMES DAILY
Status: DISCONTINUED | OUTPATIENT
Start: 2024-05-27 | End: 2024-05-29 | Stop reason: HOSPADM

## 2024-05-27 RX ORDER — MAGNESIUM SULFATE IN WATER 40 MG/ML
2000 INJECTION, SOLUTION INTRAVENOUS PRN
Status: DISCONTINUED | OUTPATIENT
Start: 2024-05-27 | End: 2024-05-29 | Stop reason: HOSPADM

## 2024-05-27 RX ORDER — ONDANSETRON 4 MG/1
4 TABLET, ORALLY DISINTEGRATING ORAL EVERY 8 HOURS PRN
Status: DISCONTINUED | OUTPATIENT
Start: 2024-05-27 | End: 2024-05-29 | Stop reason: HOSPADM

## 2024-05-27 RX ORDER — SODIUM CHLORIDE 9 MG/ML
INJECTION, SOLUTION INTRAVENOUS PRN
Status: DISCONTINUED | OUTPATIENT
Start: 2024-05-27 | End: 2024-05-29 | Stop reason: HOSPADM

## 2024-05-27 RX ORDER — ENOXAPARIN SODIUM 100 MG/ML
30 INJECTION SUBCUTANEOUS 2 TIMES DAILY
Status: DISCONTINUED | OUTPATIENT
Start: 2024-05-28 | End: 2024-05-29 | Stop reason: HOSPADM

## 2024-05-27 RX ORDER — SODIUM CHLORIDE 0.9 % (FLUSH) 0.9 %
10 SYRINGE (ML) INJECTION EVERY 12 HOURS SCHEDULED
Status: DISCONTINUED | OUTPATIENT
Start: 2024-05-27 | End: 2024-05-29 | Stop reason: HOSPADM

## 2024-05-27 RX ORDER — INSULIN LISPRO 100 [IU]/ML
0-8 INJECTION, SOLUTION INTRAVENOUS; SUBCUTANEOUS
Status: DISCONTINUED | OUTPATIENT
Start: 2024-05-28 | End: 2024-05-29 | Stop reason: HOSPADM

## 2024-05-27 RX ORDER — CLONIDINE HYDROCHLORIDE 0.1 MG/1
0.1 TABLET ORAL 2 TIMES DAILY
Status: DISCONTINUED | OUTPATIENT
Start: 2024-05-27 | End: 2024-05-29 | Stop reason: HOSPADM

## 2024-05-27 RX ORDER — DEXTROSE MONOHYDRATE 100 MG/ML
INJECTION, SOLUTION INTRAVENOUS CONTINUOUS PRN
Status: DISCONTINUED | OUTPATIENT
Start: 2024-05-27 | End: 2024-05-29 | Stop reason: HOSPADM

## 2024-05-27 RX ORDER — LOSARTAN POTASSIUM 50 MG/1
100 TABLET ORAL DAILY
Status: DISCONTINUED | OUTPATIENT
Start: 2024-05-28 | End: 2024-05-29 | Stop reason: HOSPADM

## 2024-05-27 RX ORDER — GLUCAGON 1 MG/ML
1 KIT INJECTION PRN
Status: DISCONTINUED | OUTPATIENT
Start: 2024-05-27 | End: 2024-05-29 | Stop reason: HOSPADM

## 2024-05-27 RX ORDER — NITROGLYCERIN 0.4 MG/1
0.4 TABLET SUBLINGUAL ONCE
Status: DISCONTINUED | OUTPATIENT
Start: 2024-05-27 | End: 2024-05-27

## 2024-05-27 RX ORDER — ROPINIROLE 0.5 MG/1
0.5 TABLET, FILM COATED ORAL NIGHTLY
Status: DISCONTINUED | OUTPATIENT
Start: 2024-05-27 | End: 2024-05-29 | Stop reason: HOSPADM

## 2024-05-27 RX ORDER — INSULIN LISPRO 100 [IU]/ML
0-4 INJECTION, SOLUTION INTRAVENOUS; SUBCUTANEOUS NIGHTLY
Status: DISCONTINUED | OUTPATIENT
Start: 2024-05-27 | End: 2024-05-29 | Stop reason: HOSPADM

## 2024-05-27 RX ORDER — ASPIRIN 81 MG/1
81 TABLET, CHEWABLE ORAL DAILY
Status: DISCONTINUED | OUTPATIENT
Start: 2024-05-28 | End: 2024-05-29 | Stop reason: HOSPADM

## 2024-05-27 RX ORDER — SENNOSIDES A AND B 8.6 MG/1
1 TABLET, FILM COATED ORAL DAILY PRN
Status: DISCONTINUED | OUTPATIENT
Start: 2024-05-27 | End: 2024-05-29 | Stop reason: HOSPADM

## 2024-05-27 RX ORDER — HYDROCODONE BITARTRATE AND ACETAMINOPHEN 5; 325 MG/1; MG/1
1 TABLET ORAL ONCE
Status: COMPLETED | OUTPATIENT
Start: 2024-05-27 | End: 2024-05-27

## 2024-05-27 RX ORDER — HYDRALAZINE HYDROCHLORIDE 20 MG/ML
10 INJECTION INTRAMUSCULAR; INTRAVENOUS EVERY 6 HOURS PRN
Status: DISCONTINUED | OUTPATIENT
Start: 2024-05-27 | End: 2024-05-29 | Stop reason: HOSPADM

## 2024-05-27 RX ORDER — ASPIRIN 81 MG/1
324 TABLET, CHEWABLE ORAL ONCE
Status: DISCONTINUED | OUTPATIENT
Start: 2024-05-27 | End: 2024-05-27

## 2024-05-27 RX ADMIN — HYDROCODONE BITARTRATE AND ACETAMINOPHEN 1 TABLET: 5; 325 TABLET ORAL at 15:53

## 2024-05-27 RX ADMIN — HYDROCODONE BITARTRATE AND ACETAMINOPHEN 1 TABLET: 5; 325 TABLET ORAL at 21:48

## 2024-05-27 ASSESSMENT — PAIN DESCRIPTION - LOCATION
LOCATION: LEG
LOCATION: CHEST

## 2024-05-27 ASSESSMENT — PAIN - FUNCTIONAL ASSESSMENT
PAIN_FUNCTIONAL_ASSESSMENT: 0-10

## 2024-05-27 ASSESSMENT — ENCOUNTER SYMPTOMS
SHORTNESS OF BREATH: 0
ABDOMINAL PAIN: 0
DIARRHEA: 0
CHEST TIGHTNESS: 1
NAUSEA: 0
PHOTOPHOBIA: 0
COUGH: 0
VOMITING: 0
ABDOMINAL DISTENTION: 0
BACK PAIN: 0

## 2024-05-27 ASSESSMENT — PAIN DESCRIPTION - ORIENTATION
ORIENTATION: MID;UPPER
ORIENTATION: RIGHT
ORIENTATION: RIGHT;LEFT;MID

## 2024-05-27 ASSESSMENT — PAIN SCALES - GENERAL
PAINLEVEL_OUTOF10: 10
PAINLEVEL_OUTOF10: 7
PAINLEVEL_OUTOF10: 9
PAINLEVEL_OUTOF10: 9

## 2024-05-27 ASSESSMENT — PAIN DESCRIPTION - DESCRIPTORS
DESCRIPTORS: ACHING;DULL;DISCOMFORT
DESCRIPTORS: SHARP;DISCOMFORT;PRESSURE
DESCRIPTORS: DULL;SHARP;PRESSURE

## 2024-05-27 ASSESSMENT — PAIN DESCRIPTION - FREQUENCY: FREQUENCY: CONTINUOUS

## 2024-05-27 ASSESSMENT — PAIN DESCRIPTION - ONSET: ONSET: ON-GOING

## 2024-05-27 ASSESSMENT — PAIN DESCRIPTION - PAIN TYPE: TYPE: ACUTE PAIN

## 2024-05-27 NOTE — ED PROVIDER NOTES
Javier Justin is a 73-year-old male present emergency department with concern for substernal chest pain that is radiating up to the right jaw.  Symptoms have been coming and going.  Patient states he sometimes diaphoretic when he started.  Patient states that he feels lightheaded and is having some difficulty ambulating.  Patient has had symptoms present for 2 days.  Patient recently started steroids for his chronic lower back pain which is unchanged.  Patient denies fever, chills, nausea, vomiting.    The history is provided by the patient and medical records.        Review of Systems   Constitutional:  Negative for chills, diaphoresis, fatigue and fever.   Eyes:  Negative for photophobia and visual disturbance.   Respiratory:  Positive for chest tightness. Negative for cough and shortness of breath.    Cardiovascular:  Positive for chest pain. Negative for palpitations and leg swelling.   Gastrointestinal:  Negative for abdominal distention, abdominal pain, diarrhea, nausea and vomiting.   Genitourinary:  Negative for dysuria.   Musculoskeletal:  Negative for back pain, neck pain and neck stiffness.   Skin:  Negative for pallor and rash.   Neurological:  Negative for headaches.   Psychiatric/Behavioral:  Negative for confusion.         Physical Exam  Vitals and nursing note reviewed.   Constitutional:       General: He is not in acute distress.     Appearance: Normal appearance. He is not ill-appearing.   HENT:      Head: Normocephalic and atraumatic.   Eyes:      General: No scleral icterus.     Conjunctiva/sclera: Conjunctivae normal.      Pupils: Pupils are equal, round, and reactive to light.   Cardiovascular:      Rate and Rhythm: Normal rate and regular rhythm.   Pulmonary:      Effort: Pulmonary effort is normal.      Breath sounds: Normal breath sounds.   Abdominal:      General: Bowel sounds are normal. There is no distension.      Palpations: Abdomen is soft.      Tenderness: There is no abdominal

## 2024-05-28 ENCOUNTER — APPOINTMENT (OUTPATIENT)
Dept: NUCLEAR MEDICINE | Age: 74
DRG: 311 | End: 2024-05-28
Payer: MEDICARE

## 2024-05-28 ENCOUNTER — APPOINTMENT (OUTPATIENT)
Age: 74
DRG: 311 | End: 2024-05-28
Payer: MEDICARE

## 2024-05-28 PROBLEM — E66.01 CLASS 3 SEVERE OBESITY IN ADULT (HCC): Status: ACTIVE | Noted: 2024-05-28

## 2024-05-28 PROBLEM — E66.813 CLASS 3 SEVERE OBESITY IN ADULT: Status: ACTIVE | Noted: 2024-05-28

## 2024-05-28 PROBLEM — N17.9 ACUTE KIDNEY INJURY (HCC): Status: ACTIVE | Noted: 2024-05-28

## 2024-05-28 LAB
ALBUMIN SERPL-MCNC: 4 G/DL (ref 3.5–5.2)
ALP SERPL-CCNC: 56 U/L (ref 40–129)
ALT SERPL-CCNC: 49 U/L (ref 0–40)
ANION GAP SERPL CALCULATED.3IONS-SCNC: 11 MMOL/L (ref 7–16)
ANION GAP SERPL CALCULATED.3IONS-SCNC: 15 MMOL/L (ref 7–16)
AST SERPL-CCNC: 57 U/L (ref 0–39)
BASOPHILS # BLD: 0.04 K/UL (ref 0–0.2)
BASOPHILS NFR BLD: 0 % (ref 0–2)
BILIRUB SERPL-MCNC: 0.8 MG/DL (ref 0–1.2)
BUN SERPL-MCNC: 34 MG/DL (ref 6–23)
BUN SERPL-MCNC: 36 MG/DL (ref 6–23)
CALCIUM SERPL-MCNC: 8.9 MG/DL (ref 8.6–10.2)
CALCIUM SERPL-MCNC: 9.1 MG/DL (ref 8.6–10.2)
CHLORIDE SERPL-SCNC: 96 MMOL/L (ref 98–107)
CHLORIDE SERPL-SCNC: 99 MMOL/L (ref 98–107)
CHOLEST SERPL-MCNC: 178 MG/DL
CO2 SERPL-SCNC: 22 MMOL/L (ref 22–29)
CO2 SERPL-SCNC: 24 MMOL/L (ref 22–29)
CREAT SERPL-MCNC: 1.5 MG/DL (ref 0.7–1.2)
CREAT SERPL-MCNC: 1.5 MG/DL (ref 0.7–1.2)
ECHO BSA: 2.45 M2
EOSINOPHIL # BLD: 0.09 K/UL (ref 0.05–0.5)
EOSINOPHILS RELATIVE PERCENT: 1 % (ref 0–6)
ERYTHROCYTE [DISTWIDTH] IN BLOOD BY AUTOMATED COUNT: 15.4 % (ref 11.5–15)
GFR, ESTIMATED: 48 ML/MIN/1.73M2
GFR, ESTIMATED: 51 ML/MIN/1.73M2
GLUCOSE BLD-MCNC: 130 MG/DL (ref 74–99)
GLUCOSE BLD-MCNC: 133 MG/DL (ref 74–99)
GLUCOSE BLD-MCNC: 138 MG/DL (ref 74–99)
GLUCOSE BLD-MCNC: 256 MG/DL (ref 74–99)
GLUCOSE BLD-MCNC: 262 MG/DL (ref 74–99)
GLUCOSE SERPL-MCNC: 144 MG/DL (ref 74–99)
GLUCOSE SERPL-MCNC: 266 MG/DL (ref 74–99)
HBA1C MFR BLD: 7.5 % (ref 4–5.6)
HCT VFR BLD AUTO: 36.2 % (ref 37–54)
HDLC SERPL-MCNC: 48 MG/DL
HGB BLD-MCNC: 11.3 G/DL (ref 12.5–16.5)
IMM GRANULOCYTES # BLD AUTO: 0.29 K/UL (ref 0–0.58)
IMM GRANULOCYTES NFR BLD: 2 % (ref 0–5)
LDLC SERPL CALC-MCNC: 100 MG/DL
LYMPHOCYTES NFR BLD: 3.51 K/UL (ref 1.5–4)
LYMPHOCYTES RELATIVE PERCENT: 26 % (ref 20–42)
MCH RBC QN AUTO: 26.8 PG (ref 26–35)
MCHC RBC AUTO-ENTMCNC: 31.2 G/DL (ref 32–34.5)
MCV RBC AUTO: 86 FL (ref 80–99.9)
MONOCYTES NFR BLD: 1.12 K/UL (ref 0.1–0.95)
MONOCYTES NFR BLD: 8 % (ref 2–12)
NEUTROPHILS NFR BLD: 62 % (ref 43–80)
NEUTS SEG NFR BLD: 8.38 K/UL (ref 1.8–7.3)
NUC STRESS EJECTION FRACTION: 75 %
PLATELET # BLD AUTO: 223 K/UL (ref 130–450)
PMV BLD AUTO: 11.1 FL (ref 7–12)
POTASSIUM SERPL-SCNC: 4.4 MMOL/L (ref 3.5–5)
POTASSIUM SERPL-SCNC: 4.5 MMOL/L (ref 3.5–5)
PROT SERPL-MCNC: 6.6 G/DL (ref 6.4–8.3)
RBC # BLD AUTO: 4.21 M/UL (ref 3.8–5.8)
SODIUM SERPL-SCNC: 133 MMOL/L (ref 132–146)
SODIUM SERPL-SCNC: 134 MMOL/L (ref 132–146)
STRESS BASELINE DIAS BP: 74 MMHG
STRESS BASELINE HR: 62 BPM
STRESS BASELINE SYS BP: 124 MMHG
STRESS ESTIMATED WORKLOAD: 1 METS
STRESS PEAK DIAS BP: 74 MMHG
STRESS PEAK SYS BP: 127 MMHG
STRESS PERCENT HR ACHIEVED: 48 %
STRESS POST PEAK HR: 71 BPM
STRESS RATE PRESSURE PRODUCT: 9017 BPM*MMHG
STRESS ST DEPRESSION: 0 MM
STRESS TARGET HR: 149 BPM
TRIGL SERPL-MCNC: 152 MG/DL
TROPONIN I SERPL HS-MCNC: 30 NG/L (ref 0–11)
TROPONIN I SERPL HS-MCNC: 36 NG/L (ref 0–11)
VLDLC SERPL CALC-MCNC: 30 MG/DL
WBC OTHER # BLD: 13.4 K/UL (ref 4.5–11.5)

## 2024-05-28 PROCEDURE — 93017 CV STRESS TEST TRACING ONLY: CPT

## 2024-05-28 PROCEDURE — 6370000000 HC RX 637 (ALT 250 FOR IP): Performed by: PHYSICIAN ASSISTANT

## 2024-05-28 PROCEDURE — 2060000000 HC ICU INTERMEDIATE R&B

## 2024-05-28 PROCEDURE — 84484 ASSAY OF TROPONIN QUANT: CPT

## 2024-05-28 PROCEDURE — 80053 COMPREHEN METABOLIC PANEL: CPT

## 2024-05-28 PROCEDURE — A9500 TC99M SESTAMIBI: HCPCS | Performed by: RADIOLOGY

## 2024-05-28 PROCEDURE — 93016 CV STRESS TEST SUPVJ ONLY: CPT | Performed by: INTERNAL MEDICINE

## 2024-05-28 PROCEDURE — 85025 COMPLETE CBC W/AUTO DIFF WBC: CPT

## 2024-05-28 PROCEDURE — 80048 BASIC METABOLIC PNL TOTAL CA: CPT

## 2024-05-28 PROCEDURE — 83036 HEMOGLOBIN GLYCOSYLATED A1C: CPT

## 2024-05-28 PROCEDURE — 80061 LIPID PANEL: CPT

## 2024-05-28 PROCEDURE — 78452 HT MUSCLE IMAGE SPECT MULT: CPT

## 2024-05-28 PROCEDURE — 78452 HT MUSCLE IMAGE SPECT MULT: CPT | Performed by: INTERNAL MEDICINE

## 2024-05-28 PROCEDURE — 93018 CV STRESS TEST I&R ONLY: CPT | Performed by: INTERNAL MEDICINE

## 2024-05-28 PROCEDURE — 82962 GLUCOSE BLOOD TEST: CPT

## 2024-05-28 PROCEDURE — 36415 COLL VENOUS BLD VENIPUNCTURE: CPT

## 2024-05-28 PROCEDURE — 6360000002 HC RX W HCPCS: Performed by: PHYSICIAN ASSISTANT

## 2024-05-28 PROCEDURE — 6360000002 HC RX W HCPCS: Performed by: INTERNAL MEDICINE

## 2024-05-28 PROCEDURE — 3430000000 HC RX DIAGNOSTIC RADIOPHARMACEUTICAL: Performed by: RADIOLOGY

## 2024-05-28 PROCEDURE — 2580000003 HC RX 258: Performed by: PHYSICIAN ASSISTANT

## 2024-05-28 RX ORDER — HYDROCODONE BITARTRATE AND ACETAMINOPHEN 10; 325 MG/1; MG/1
1 TABLET ORAL EVERY 6 HOURS PRN
Status: DISCONTINUED | OUTPATIENT
Start: 2024-05-28 | End: 2024-05-29 | Stop reason: HOSPADM

## 2024-05-28 RX ORDER — HYDROCODONE BITARTRATE AND ACETAMINOPHEN 10; 325 MG/1; MG/1
1 TABLET ORAL EVERY 6 HOURS PRN
Status: DISCONTINUED | OUTPATIENT
Start: 2024-05-28 | End: 2024-05-28

## 2024-05-28 RX ORDER — TETRAKIS(2-METHOXYISOBUTYLISOCYANIDE)COPPER(I) TETRAFLUOROBORATE 1 MG/ML
10 INJECTION, POWDER, LYOPHILIZED, FOR SOLUTION INTRAVENOUS
Status: COMPLETED | OUTPATIENT
Start: 2024-05-28 | End: 2024-05-28

## 2024-05-28 RX ORDER — REGADENOSON 0.08 MG/ML
0.4 INJECTION, SOLUTION INTRAVENOUS
Status: COMPLETED | OUTPATIENT
Start: 2024-05-28 | End: 2024-05-28

## 2024-05-28 RX ORDER — TIZANIDINE 4 MG/1
4 TABLET ORAL 2 TIMES DAILY PRN
COMMUNITY
Start: 2024-05-21

## 2024-05-28 RX ORDER — TETRAKIS(2-METHOXYISOBUTYLISOCYANIDE)COPPER(I) TETRAFLUOROBORATE 1 MG/ML
30 INJECTION, POWDER, LYOPHILIZED, FOR SOLUTION INTRAVENOUS
Status: COMPLETED | OUTPATIENT
Start: 2024-05-28 | End: 2024-05-28

## 2024-05-28 RX ORDER — TIZANIDINE 4 MG/1
4 TABLET ORAL 2 TIMES DAILY PRN
Status: DISCONTINUED | OUTPATIENT
Start: 2024-05-28 | End: 2024-05-29 | Stop reason: HOSPADM

## 2024-05-28 RX ADMIN — HYDROCODONE BITARTRATE AND ACETAMINOPHEN 1 TABLET: 10; 325 TABLET ORAL at 23:44

## 2024-05-28 RX ADMIN — ROPINIROLE HYDROCHLORIDE 0.5 MG: 0.5 TABLET, FILM COATED ORAL at 20:18

## 2024-05-28 RX ADMIN — TIZANIDINE 4 MG: 4 TABLET ORAL at 01:12

## 2024-05-28 RX ADMIN — CLONIDINE HYDROCHLORIDE 0.1 MG: 0.1 TABLET ORAL at 20:18

## 2024-05-28 RX ADMIN — SODIUM CHLORIDE, PRESERVATIVE FREE 10 ML: 5 INJECTION INTRAVENOUS at 01:12

## 2024-05-28 RX ADMIN — CLONIDINE HYDROCHLORIDE 0.1 MG: 0.1 TABLET ORAL at 13:46

## 2024-05-28 RX ADMIN — PREGABALIN 150 MG: 75 CAPSULE ORAL at 13:45

## 2024-05-28 RX ADMIN — PREGABALIN 150 MG: 75 CAPSULE ORAL at 20:18

## 2024-05-28 RX ADMIN — SODIUM CHLORIDE, PRESERVATIVE FREE 10 ML: 5 INJECTION INTRAVENOUS at 20:18

## 2024-05-28 RX ADMIN — CLONIDINE HYDROCHLORIDE 0.1 MG: 0.1 TABLET ORAL at 01:12

## 2024-05-28 RX ADMIN — ALPRAZOLAM 0.5 MG: 0.25 TABLET ORAL at 01:12

## 2024-05-28 RX ADMIN — LOSARTAN POTASSIUM 100 MG: 50 TABLET, FILM COATED ORAL at 13:46

## 2024-05-28 RX ADMIN — ENOXAPARIN SODIUM 30 MG: 100 INJECTION SUBCUTANEOUS at 20:17

## 2024-05-28 RX ADMIN — FLUOXETINE 40 MG: 20 CAPSULE ORAL at 01:12

## 2024-05-28 RX ADMIN — SODIUM CHLORIDE, PRESERVATIVE FREE 10 ML: 5 INJECTION INTRAVENOUS at 09:00

## 2024-05-28 RX ADMIN — INSULIN LISPRO 4 UNITS: 100 INJECTION, SOLUTION INTRAVENOUS; SUBCUTANEOUS at 16:36

## 2024-05-28 RX ADMIN — FLUOXETINE 40 MG: 20 CAPSULE ORAL at 13:46

## 2024-05-28 RX ADMIN — PREGABALIN 150 MG: 75 CAPSULE ORAL at 01:12

## 2024-05-28 RX ADMIN — ASPIRIN 81 MG CHEWABLE TABLET 81 MG: 81 TABLET CHEWABLE at 13:46

## 2024-05-28 RX ADMIN — ROPINIROLE HYDROCHLORIDE 0.5 MG: 0.5 TABLET, FILM COATED ORAL at 01:12

## 2024-05-28 RX ADMIN — FLUOXETINE 40 MG: 20 CAPSULE ORAL at 20:18

## 2024-05-28 RX ADMIN — Medication 10 MILLICURIE: at 09:26

## 2024-05-28 RX ADMIN — Medication 30 MILLICURIE: at 10:45

## 2024-05-28 RX ADMIN — HYDROCODONE BITARTRATE AND ACETAMINOPHEN 1 TABLET: 10; 325 TABLET ORAL at 13:13

## 2024-05-28 RX ADMIN — HYDROCODONE BITARTRATE AND ACETAMINOPHEN 1 TABLET: 10; 325 TABLET ORAL at 06:09

## 2024-05-28 RX ADMIN — REGADENOSON 0.4 MG: 0.08 INJECTION, SOLUTION INTRAVENOUS at 10:45

## 2024-05-28 RX ADMIN — ACETAMINOPHEN 650 MG: 325 TABLET ORAL at 16:37

## 2024-05-28 ASSESSMENT — PAIN DESCRIPTION - ORIENTATION
ORIENTATION: RIGHT
ORIENTATION: RIGHT;LEFT;LOWER
ORIENTATION: RIGHT;LEFT

## 2024-05-28 ASSESSMENT — PAIN DESCRIPTION - DESCRIPTORS
DESCRIPTORS: ACHING;DISCOMFORT
DESCRIPTORS: SHARP;THROBBING
DESCRIPTORS: ACHING

## 2024-05-28 ASSESSMENT — PAIN SCALES - GENERAL
PAINLEVEL_OUTOF10: 10
PAINLEVEL_OUTOF10: 8
PAINLEVEL_OUTOF10: 7
PAINLEVEL_OUTOF10: 6
PAINLEVEL_OUTOF10: 8

## 2024-05-28 ASSESSMENT — PAIN DESCRIPTION - LOCATION
LOCATION: BACK
LOCATION: BACK
LOCATION: BACK;LEG
LOCATION: BACK;LEG
LOCATION: BACK
LOCATION: BUTTOCKS;LEG

## 2024-05-28 NOTE — CARE COORDINATION
Social work / Discharge planning:          Patient is independent from home.   He drove himself to the hospital.     No discharge needs identified at this time.    Electronically signed by DENNIS Phillips on 5/28/2024 at 12:57 PM

## 2024-05-28 NOTE — ED PROVIDER NOTES
[facility] EMERGENCY DEPARTMENT  EMERGENCY DEPARTMENT ENCOUNTER        Pt Name: Javier Justin Sr.  MRN: [id number]  Birthdate 1950  Date of evaluation: 5/27/2024  Provider: Nick Low MD  PCP: Raúl Farias MD  Note Started: 10:48 PM EDT 5/27/24    CHIEF COMPLAINT       Chief Complaint   Patient presents with    Chest Pain     Seen at [facility] today, wanted transfer for stress test, pt signed out AMA, d/t not wanting to be transferred for family reasons.       HISTORY OF PRESENT ILLNESS: 1 or more Elements   History From: Patient.    Limitations to history : None    Javier Justin Sr. is a 73 y.o. male with a past medical history of hypertension, hyperlipidemia, type 2 diabetes mellitus who presents to the ED with complaints of chest pain.  Patient states that he started having heavy and pressure-like chest pain 3 days ago, has been pretty constant since then.  Patient was seen in the [facility] ED initially, he was offered transfer.  But patient signed out AMA and came here.  Patient denies any shortness of breath right now.  Patient complains of chest heaviness upon arrival to the ED.  Patient denies any exertional shortness of breath.  Patient states that the last time he saw a cardiologist was 40 years ago.  He denies any fever, headache, nausea, abdominal pain, constipation, diarrhea, urinary symptoms.    Nursing Notes were all reviewed and agreed with or any disagreements were addressed in the HPI.    ROS:   Pertinent positives and negatives are stated within HPI, all other systems reviewed and are negative.    --------------------------------------------- PAST HISTORY ---------------------------------------------  Past Medical History:  has a past medical history of Anxiety, Back pain, Diabetes mellitus (HCC), Hyperlipidemia, Hypertension, and Sciatica.    Past Surgical History:  has a past surgical history that includes back surgery; knee surgery; other   Osteopenia.  Total knee arthroplasty.  No periprosthetic fracture or lucency.     Total knee arthroplasty.  No periprosthetic fracture or lucency.     XR KNEE RIGHT (MIN 4 VIEWS)    Result Date: 5/27/2024  EXAMINATION: FOUR XRAY VIEWS OF THE RIGHT KNEE 5/27/2024 3:27 pm COMPARISON: 01/10/2023 HISTORY: ORDERING SYSTEM PROVIDED HISTORY: pain TECHNOLOGIST PROVIDED HISTORY: Reason for exam:->pain FINDINGS: Total knee arthroplasty.  No periprosthetic fracture or lucency.  Osteopenia. Small knee joint effusion.  Atherosclerotic vascular calcifications.     Total knee arthroplasty.  No periprosthetic fracture or lucency.  Small effusion.     XR CHEST PORTABLE    Result Date: 5/27/2024  EXAMINATION: ONE XRAY VIEW OF THE CHEST 5/27/2024 2:50 pm COMPARISON: 04/10/2015 HISTORY: ORDERING SYSTEM PROVIDED HISTORY: chest pain TECHNOLOGIST PROVIDED HISTORY: Reason for exam:->chest pain FINDINGS: Mild central bronchial wall thickening and chronic interstitial coarsening. Bibasilar subsegmental atelectasis or scarring. Atherosclerotic vascular calcifications.  Enlarged cardiac silhouette.  Osteopenia.     Chronic findings without appreciable acute process.       No results found.    See preliminary interpretation by me below.     EKG:  This EKG is signed and interpreted by me.    Rate: 68  Pr 160  Qrs 76  Qtc 421  Rhythm: Sinus  Interpretation: no acute changes  Comparison: stable as compared to patient's most recent EKG   Interpreted by me      ------------------------- NURSING NOTES AND VITALS REVIEWED ---------------------------   The nursing notes within the ED encounter and vital signs as below have been reviewed by myself.  BP (!) 158/98   Pulse 69   Temp 98.4 °F (36.9 °C)   Resp 15   Ht 1.753 m (5' 9\")   Wt 122.9 kg (271 lb)   SpO2 97%   BMI 40.02 kg/m²   Oxygen Saturation Interpretation: Normal    The patient’s available past medical records and past encounters were reviewed.        ------------------------------ ED

## 2024-05-28 NOTE — ACP (ADVANCE CARE PLANNING)
Advance Care Planning   Healthcare Decision Maker:    Primary Decision Maker: Sunita Justin - [facility] - [phone]    Click here to complete Healthcare Decision Makers including selection of the Healthcare Decision Maker Relationship (ie \"Primary\").              Estimated nutritional needs determined using [facility] Standards of Nutrition Care for maintenance, wound healing with consideration for TPN and ostomy using current body weight 95.8 kg (115%IBW).

## 2024-05-28 NOTE — CONSULTS
nontender, nondistended, no gross organomegaly or mass    Skin: Warm and dry, no cyanosis.    Musculoskeletal: normal tone and strength in the upper and lower extremities bilaterally    Neuro: Moves all extremities appropriately to command.  Normal sensation to light touch in the upper and lower extremities bilaterally    Psych: Cooperative, and normal affect    Intake/Output:  No intake or output data in the 24 hours ending 05/28/24 0430  No intake/output data recorded.    Laboratory Tests:  Lab Results   Component Value Date    CREATININE 1.1 05/27/2024    BUN 31 (H) 05/27/2024     05/27/2024    K 4.5 05/27/2024    CL 95 (L) 05/27/2024    CO2 25 05/27/2024     No results for input(s): \"CKTOTAL\", \"CKMB\", \"TROPONINI\" in the last 72 hours.  Lab Results   Component Value Date    PROBNP 673 (H) 05/27/2024     Lab Results   Component Value Date/Time    WBC 14.6 05/27/2024 09:43 PM    RBC 4.40 05/27/2024 09:43 PM    HGB 12.2 05/27/2024 09:43 PM    HCT 37.9 05/27/2024 09:43 PM    MCV 86.1 05/27/2024 09:43 PM    MCH 27.7 05/27/2024 09:43 PM    MCHC 32.2 05/27/2024 09:43 PM    RDW 15.3 05/27/2024 09:43 PM     05/27/2024 09:43 PM    MPV 10.8 05/27/2024 09:43 PM     Recent Labs     05/27/24  1436 05/27/24  2143   ALKPHOS 64 63   ALT 41* 48*   AST 44* 51*   BILITOT 0.6 0.8     Lab Results   Component Value Date/Time    MG 1.4 04/17/2024 03:07 PM     Lab Results   Component Value Date/Time    PROTIME 12.9 04/10/2015 07:57 PM    INR 1.2 04/10/2015 07:57 PM     Lab Results   Component Value Date/Time    TSH 3.630 01/09/2017 01:49 PM     No components found for: \"CHLPL\"  Lab Results   Component Value Date    TRIG 156 07/08/2020    TRIG 149 03/08/2019    TRIG 260 08/31/2018     Lab Results   Component Value Date    HDL 52 07/08/2020    HDL 48 03/08/2019    HDL 37 08/31/2018     No components found for: \"LDLCALC\"        Active Hospital Problems    Diagnosis     Chest pain [R07.9]     Chronic right-sided low back pain  with sciatica [M54.40, G89.29]     Diabetes type 2, controlled (HCC) [E11.9]     Essential hypertension [I10]     Pure hypercholesterolemia [E78.00]     Depression [F32.A]              ASSESSMENT / PLAN:  Recent onset chest pain suspicious for angina with no acute EKG changes and unremarkable high-sensitivity troponin.  Poor functional capacity and will not be able to walk on treadmill due to back pain/spinal stimulator  -Lexiscan stress test ordered and pending.  -Continue indefinite baby aspirin and statin due to elevated 10-year risk of cardiovascular disease.    #2 essential hypertension controlled on current medical management.  Continue Catapres and losartan.    #3 hyperlipidemia and primary prevention mode.  Current LDL is 100 on rosuvastatin.    #4 diabetes and diabetic education reviewed with patient.          Electronically signed by Jevon French MD on 5/28/2024 at 4:30 AM

## 2024-05-28 NOTE — H&P
Internal Medicine History & Physical     Name: Javier Justin Sr.  : 1950  Chief Complaint: Chest Pain (Seen at [facility] today, wanted transfer for stress test, pt signed out AMA, d/t not wanting to be transferred for family reasons.)  Primary Care Physician: Raúl Farias MD  Admission date: 2024  Date of service: 2024     History of Present Illness  Javier is a 73 y.o. year old male.  Patient presents with worsening pain in the chest.  Patient states that the pain was in the lower center of the chest and then a higher up into the chest and then radiating up to his right neck.  Patient denies fevers, chills.  Patient states he had not been sleeping well Friday or Saturday night and came to the hospital on  that he presented on Monday.  Patient states that he was having night sweats as well and was unable to get comfortable.  States the pain would come and go.  Nothing was making it better or worse.  Patient denies nausea or vomiting.  Admits to diarrhea.  Additionally, he states he has been extremely weak and fatigued over the last several days with no energy.  Denies any sick contacts or recent travel.  No other arthralgias or myalgias.  No vision or hearing changes, dysuria, hematuria, bloody stools.  Patient is a fair historian.  Very pleasant.  States blood sugars have remained stable even with the sweating episodes and chest pain.      ED course:   Initial blood work and imaging studies performed. Admission recommended by ED physician. Case was discussed with ED provider. Meds in ED consisted of the following:  Medications   rOPINIRole (REQUIP) tablet 0.5 mg (0.5 mg Oral Given 24)   pregabalin (LYRICA) capsule 150 mg (150 mg Oral Given 24)   losartan (COZAAR) tablet 100 mg (has no administration in time range)   FLUoxetine (PROZAC) capsule 40 mg (40 mg Oral Given 24)   cloNIDine (CATAPRES) tablet 0.1 mg (0.1 mg Oral Given 24)   ALPRAZolam  tongue midline, speech appropriate without slurring, sensation to fine touch intact in upper and lower extremities    Labs-   Lab Results   Component Value Date    WBC 13.4 (H) 05/28/2024    HGB 11.3 (L) 05/28/2024    HCT 36.2 (L) 05/28/2024     05/28/2024     05/28/2024    K 4.5 05/28/2024    CL 99 05/28/2024    CREATININE 1.5 (H) 05/28/2024    BUN 34 (H) 05/28/2024    CO2 24 05/28/2024    GLUCOSE 144 (H) 05/28/2024    ALT 49 (H) 05/28/2024    AST 57 (H) 05/28/2024    INR 1.2 04/10/2015    APTT 31.1 04/10/2015     Lab Results   Component Value Date    CKTOTAL 318 (H) 07/06/2016    TROPONINI <0.01 08/04/2020         Recent Radiological Studies:  XR CHEST (2 VW)   Final Result   Chronic findings without appreciable acute process.             Assessment  Active Hospital Problems    Diagnosis     Class 3 severe obesity in adult (HCC) [E66.01]     Acute kidney injury (HCC) [N17.9]     Chest pain [R07.9]     Chronic right-sided low back pain with sciatica [M54.40, G89.29]     Diabetes type 2, controlled (HCC) [E11.9]     Essential hypertension [I10]     Pure hypercholesterolemia [E78.00]     Depression [F32.A]        Plan  Chest pain with concern for acute coronary syndrome  Stress test is pending  Cardiology was consulted  Last stress test was -6 years ago  Type 2 diabetes with good control  Hemoglobin A1c 7.5  Continue medications as ordered  Hypertension  Continue home medications monitor  Class III obesity  Dietary recommendations to be given prior to discharge  Acute kidney injury  Creatinine bumped to 1.5 from baseline of 1.1  Repeat labs in the morning    PT/OT  Follow labs  DVT prophylaxis.  Please see orders for further management and care.   for discharge planning  Discharge plan: TBD pending clinical improvement.  Monitor vital signs and if creatinine improving and patient vital signs are stable, consider discharge tomorrow.    Electronically signed by Issac Abdul MD on 5/28/2024

## 2024-05-28 NOTE — PROGRESS NOTES
Occupational Therapy      Occupational Therapy referral received.   Spoke with patient - reports up walking in room , able to manage own self care.  At this time, feels no need for OT services.   OT order discontinued

## 2024-05-28 NOTE — PROGRESS NOTES
Physical Therapy    PT orders received. Pt reports he is independent with mobility and does not need inpatient PT services. Will discharge order. Pt instructed to notify staff if PT needs arise.

## 2024-05-28 NOTE — PLAN OF CARE
Problem: Discharge Planning  Goal: Discharge to home or other facility with appropriate resources  5/28/2024 0819 by Cristiana Vincent RN  Outcome: Progressing  5/28/2024 0024 by Shirley Sanz RN  Outcome: Progressing     Problem: Pain  Goal: Verbalizes/displays adequate comfort level or baseline comfort level  5/28/2024 0819 by Cristiana Vincent RN  Outcome: Progressing  5/28/2024 0024 by Shirley Sanz RN  Outcome: Progressing     Problem: Safety - Adult  Goal: Free from fall injury  Outcome: Progressing     Problem: ABCDS Injury Assessment  Goal: Absence of physical injury  Outcome: Progressing

## 2024-05-28 NOTE — PROGRESS NOTES
SPIRITUAL HEALTH SERVICES - [facility] Encounter    Name: Javier Justin                   Referral: Routine Visit    Sacraments  Anointed (Last Rites): Yes  Apostolic [place]: No  Confession: No  Communion: Declined     Assessment:  Patient receptive to  visit.      Intervention:   provided spiritual support and sacramental ministry for patient.     Outcome:  Patient expressed gratitude for visit.    Plan:  Chaplains will remain available to offer spiritual and emotional support as needed.      Electronically signed by Chaplain Jennifer, on 5/28/2024 at 7:42 PM.  Spiritual Care Department  [facility]  [phone]

## 2024-05-28 NOTE — PROGRESS NOTES
4 Eyes Skin Assessment     NAME:  Javier Justin .  YOB: 1950  MEDICAL RECORD NUMBER:  [id number]    The patient is being assessed for  Admission    I agree that at least one RN has performed a thorough Head to Toe Skin Assessment on the patient. ALL assessment sites listed below have been assessed.      Areas assessed by both nurses:    Head, Face, Ears, Shoulders, Back, Chest, Arms, Elbows, Hands, Sacrum. Buttock, Coccyx, Ischium, Legs. Feet and Heels, and Under Medical Devices         Does the Patient have a Wound? No noted wound(s)       Randell Prevention initiated by RN: No  Wound Care Orders initiated by RN: No    Pressure Injury (Stage 3,4, Unstageable, DTI, NWPT, and Complex wounds) if present, place Wound referral order by RN under : No    New Ostomies, if present place, Ostomy referral order under : No     Nurse 1 eSignature: Electronically signed by Shirley Sanz RN on 5/28/24 at 1:51 AM EDT    **SHARE this note so that the co-signing nurse can place an eSignature**    Nurse 2 eSignature: Electronically signed by Brittney Kothari RN on 5/28/24 at 5:43 AM EDT

## 2024-05-28 NOTE — ED NOTES
ED to Inpatient Handoff Report    Notified floor rn Yolanda that electronic handoff available and patient ready for transport to room 0615.    Safety Risks: None identified    Patient in Restraints: no    Constant Observer or Patient : no    Telemetry Monitoring Ordered: Yes          Order to transfer to unit without monitor: NA    Last MEWS: 1 Time completed: 2348    Deterioration Index: 24.95    Vitals:    05/27/24 1939 05/27/24 2023 05/27/24 2348   BP: (!) 180/98 (!) 158/98 (!) 147/82   Pulse: 70 69 65   Resp: 18 15 16   Temp: 98.4 °F (36.9 °C)  98.4 °F (36.9 °C)   TempSrc:   Oral   SpO2: 97%  95%   Weight: 122.9 kg (271 lb)     Height: 1.753 m (5' 9\")         Opportunity for questions and clarification was provided.

## 2024-05-28 NOTE — CARE COORDINATION
Internal Medicine On-call Care Coordination Note    I was called by the ED physician because they recommended admission for this patient and I cover their PCP.  The history as I understand it after discussion with the ED physician is as follows:    Having chest pain x2 days  In [facility] ED earlier today, planned to admit to [facility] however he left AMA to drive himself   Troponin 24 -> 25  EKG non-ischemic  Decision made for admission    I placed admission orders.  Including:    Telemetry  Trend troponin  Cardiology consult  ASA  Defer further cardiac testing to cardiology    Dr. Rinaldi or his coverage will see the patient tomorrow for H&P.    Electronically signed by Eugenio Combs PA-C on 5/27/2024 at 11:13 PM

## 2024-05-29 VITALS
OXYGEN SATURATION: 95 % | RESPIRATION RATE: 18 BRPM | SYSTOLIC BLOOD PRESSURE: 116 MMHG | TEMPERATURE: 98.1 F | BODY MASS INDEX: 40.35 KG/M2 | DIASTOLIC BLOOD PRESSURE: 55 MMHG | WEIGHT: 272.4 LBS | HEART RATE: 100 BPM | HEIGHT: 69 IN

## 2024-05-29 LAB
ALBUMIN SERPL-MCNC: 4 G/DL (ref 3.5–5.2)
ALP SERPL-CCNC: 62 U/L (ref 40–129)
ALT SERPL-CCNC: 71 U/L (ref 0–40)
ANION GAP SERPL CALCULATED.3IONS-SCNC: 11 MMOL/L (ref 7–16)
AST SERPL-CCNC: 66 U/L (ref 0–39)
BASOPHILS # BLD: 0.04 K/UL (ref 0–0.2)
BASOPHILS NFR BLD: 0 % (ref 0–2)
BILIRUB SERPL-MCNC: 0.8 MG/DL (ref 0–1.2)
BUN SERPL-MCNC: 33 MG/DL (ref 6–23)
CALCIUM SERPL-MCNC: 9.3 MG/DL (ref 8.6–10.2)
CHLORIDE SERPL-SCNC: 99 MMOL/L (ref 98–107)
CO2 SERPL-SCNC: 24 MMOL/L (ref 22–29)
CREAT SERPL-MCNC: 1.2 MG/DL (ref 0.7–1.2)
EOSINOPHIL # BLD: 0.23 K/UL (ref 0.05–0.5)
EOSINOPHILS RELATIVE PERCENT: 2 % (ref 0–6)
ERYTHROCYTE [DISTWIDTH] IN BLOOD BY AUTOMATED COUNT: 15.4 % (ref 11.5–15)
GFR, ESTIMATED: 65 ML/MIN/1.73M2
GLUCOSE BLD-MCNC: 146 MG/DL (ref 74–99)
GLUCOSE BLD-MCNC: 315 MG/DL (ref 74–99)
GLUCOSE SERPL-MCNC: 140 MG/DL (ref 74–99)
HCT VFR BLD AUTO: 38 % (ref 37–54)
HGB BLD-MCNC: 12 G/DL (ref 12.5–16.5)
IMM GRANULOCYTES # BLD AUTO: 0.21 K/UL (ref 0–0.58)
IMM GRANULOCYTES NFR BLD: 2 % (ref 0–5)
LYMPHOCYTES NFR BLD: 2.3 K/UL (ref 1.5–4)
LYMPHOCYTES RELATIVE PERCENT: 22 % (ref 20–42)
MCH RBC QN AUTO: 27.3 PG (ref 26–35)
MCHC RBC AUTO-ENTMCNC: 31.6 G/DL (ref 32–34.5)
MCV RBC AUTO: 86.4 FL (ref 80–99.9)
MONOCYTES NFR BLD: 0.83 K/UL (ref 0.1–0.95)
MONOCYTES NFR BLD: 8 % (ref 2–12)
NEUTROPHILS NFR BLD: 65 % (ref 43–80)
NEUTS SEG NFR BLD: 6.64 K/UL (ref 1.8–7.3)
PLATELET # BLD AUTO: 196 K/UL (ref 130–450)
PMV BLD AUTO: 11.2 FL (ref 7–12)
POTASSIUM SERPL-SCNC: 4.2 MMOL/L (ref 3.5–5)
PROT SERPL-MCNC: 6.7 G/DL (ref 6.4–8.3)
RBC # BLD AUTO: 4.4 M/UL (ref 3.8–5.8)
SODIUM SERPL-SCNC: 134 MMOL/L (ref 132–146)
WBC OTHER # BLD: 10.3 K/UL (ref 4.5–11.5)

## 2024-05-29 PROCEDURE — 6360000002 HC RX W HCPCS: Performed by: PHYSICIAN ASSISTANT

## 2024-05-29 PROCEDURE — 80053 COMPREHEN METABOLIC PANEL: CPT

## 2024-05-29 PROCEDURE — 6370000000 HC RX 637 (ALT 250 FOR IP): Performed by: PHYSICIAN ASSISTANT

## 2024-05-29 PROCEDURE — 82962 GLUCOSE BLOOD TEST: CPT

## 2024-05-29 PROCEDURE — 36415 COLL VENOUS BLD VENIPUNCTURE: CPT

## 2024-05-29 PROCEDURE — 85025 COMPLETE CBC W/AUTO DIFF WBC: CPT

## 2024-05-29 PROCEDURE — 2580000003 HC RX 258: Performed by: PHYSICIAN ASSISTANT

## 2024-05-29 RX ADMIN — CLONIDINE HYDROCHLORIDE 0.1 MG: 0.1 TABLET ORAL at 08:18

## 2024-05-29 RX ADMIN — ASPIRIN 81 MG CHEWABLE TABLET 81 MG: 81 TABLET CHEWABLE at 08:18

## 2024-05-29 RX ADMIN — PREGABALIN 150 MG: 75 CAPSULE ORAL at 08:19

## 2024-05-29 RX ADMIN — SODIUM CHLORIDE, PRESERVATIVE FREE 10 ML: 5 INJECTION INTRAVENOUS at 08:21

## 2024-05-29 RX ADMIN — INSULIN LISPRO 6 UNITS: 100 INJECTION, SOLUTION INTRAVENOUS; SUBCUTANEOUS at 11:20

## 2024-05-29 RX ADMIN — LOSARTAN POTASSIUM 100 MG: 50 TABLET, FILM COATED ORAL at 08:19

## 2024-05-29 RX ADMIN — FLUOXETINE 40 MG: 20 CAPSULE ORAL at 08:18

## 2024-05-29 RX ADMIN — HYDROCODONE BITARTRATE AND ACETAMINOPHEN 1 TABLET: 10; 325 TABLET ORAL at 08:17

## 2024-05-29 RX ADMIN — ENOXAPARIN SODIUM 30 MG: 100 INJECTION SUBCUTANEOUS at 08:21

## 2024-05-29 ASSESSMENT — PAIN SCALES - GENERAL: PAINLEVEL_OUTOF10: 7

## 2024-05-29 ASSESSMENT — PAIN DESCRIPTION - ORIENTATION: ORIENTATION: RIGHT;LEFT;LOWER;UPPER

## 2024-05-29 ASSESSMENT — PAIN - FUNCTIONAL ASSESSMENT: PAIN_FUNCTIONAL_ASSESSMENT: PREVENTS OR INTERFERES SOME ACTIVE ACTIVITIES AND ADLS

## 2024-05-29 ASSESSMENT — PAIN DESCRIPTION - LOCATION: LOCATION: BACK

## 2024-05-29 ASSESSMENT — PAIN DESCRIPTION - DESCRIPTORS: DESCRIPTORS: ACHING;SHARP

## 2024-05-29 NOTE — DISCHARGE SUMMARY
periprosthetic fracture or lucency.     Total knee arthroplasty.  No periprosthetic fracture or lucency.     XR KNEE RIGHT (MIN 4 VIEWS)    Result Date: 5/27/2024  EXAMINATION: FOUR XRAY VIEWS OF THE RIGHT KNEE 5/27/2024 3:27 pm COMPARISON: 01/10/2023 HISTORY: ORDERING SYSTEM PROVIDED HISTORY: pain TECHNOLOGIST PROVIDED HISTORY: Reason for exam:->pain FINDINGS: Total knee arthroplasty.  No periprosthetic fracture or lucency.  Osteopenia. Small knee joint effusion.  Atherosclerotic vascular calcifications.     Total knee arthroplasty.  No periprosthetic fracture or lucency.  Small effusion.     XR CHEST PORTABLE    Result Date: 5/27/2024  EXAMINATION: ONE XRAY VIEW OF THE CHEST 5/27/2024 2:50 pm COMPARISON: 04/10/2015 HISTORY: ORDERING SYSTEM PROVIDED HISTORY: chest pain TECHNOLOGIST PROVIDED HISTORY: Reason for exam:->chest pain FINDINGS: Mild central bronchial wall thickening and chronic interstitial coarsening. Bibasilar subsegmental atelectasis or scarring. Atherosclerotic vascular calcifications.  Enlarged cardiac silhouette.  Osteopenia.     Chronic findings without appreciable acute process.       DISPOSITION:  The patient's condition is good.   The patient is being discharged to home    DISCHARGE MEDICATIONS:      Medication List        CHANGE how you take these medications      ALPRAZolam 0.5 MG tablet  Commonly known as: XANAX  Take 1 tablet by mouth nightly as needed for Sleep  What changed: when to take this     cloNIDine 0.1 MG tablet  Commonly known as: CATAPRES  TAKE 1 TABLET TWICE A DAY  What changed:   how much to take  how to take this  when to take this            CONTINUE taking these medications      blood glucose test strips strip  Commonly known as: Hiren Contour Next Test  1 each by In Vitro route 3 times daily As needed.     FLUoxetine 20 MG capsule  Commonly known as: PROZAC     Handicap Placard Misc  by Does not apply route Valid for 5 years    Osteoarthritis     HYDROcodone-acetaminophen   MG per tablet  Commonly known as: NORCO     metFORMIN 500 MG tablet  Commonly known as: GLUCOPHAGE  Take 2 tablets by mouth 2 times daily (with meals)     olmesartan 40 MG tablet  Commonly known as: BENICAR  TAKE 1 TABLET DAILY     pregabalin 100 MG capsule  Commonly known as: LYRICA     rOPINIRole 0.25 MG tablet  Commonly known as: REQUIP  TAKE TWO TABLETS BY MOUTH EVERY DAY AT BEDTIME     tiZANidine 4 MG tablet  Commonly known as: ZANAFLEX            STOP taking these medications      acetaminophen 325 MG tablet  Commonly known as: TYLENOL     dexAMETHasone 6 MG tablet  Commonly known as: Decadron     naproxen 500 MG tablet  Commonly known as: NAPROSYN               Where to Get Your Medications        These medications were sent to [id number] - [place], [place] - [id number]  - P [phone] - F [phone] [address] [facility] [address]      Phone: [phone]   metFORMIN 500 MG tablet           INTERNAL MEDICINE INSTRUCTIONS:  Follow-up with primary care physician as directed in discharge paperwork.  Please review results of imaging studies with PCP.  Follow-up with consultants as directed by them.  If recurrence or worsening of symptoms go to the ED or call primary care physician.  Diet: ADULT DIET; Regular    FOLLOW-UP  No follow-up provider specified.    Preparing for this patient's discharge, including paperwork, orders, instructions, and meeting with patient did required 36 minutes.    Electronically signed by Issac Abdul MD on 5/29/2024 at 1:08 PM

## 2024-05-29 NOTE — PROGRESS NOTES
disintegrating tablet 4 mg  4 mg Oral Q8H PRN Garret, Eugenio, PA-C        Or    ondansetron (ZOFRAN) injection 4 mg  4 mg IntraVENous Q6H PRN Garret, Eugenio, PA-C        senna (SENOKOT) tablet 8.6 mg  1 tablet Oral Daily PRN Kenwood, Eugenio, PA-C        acetaminophen (TYLENOL) tablet 650 mg  650 mg Oral Q6H PRN Kenwood, Eugenio, PA-C   650 mg at 05/28/24 1637    Or    acetaminophen (TYLENOL) suppository 650 mg  650 mg Rectal Q6H PRN Garret, Eugenio, PA-C        aspirin chewable tablet 81 mg  81 mg Oral Daily Garret, Eugenio, PA-C   81 mg at 05/29/24 0818    hydrALAZINE (APRESOLINE) injection 10 mg  10 mg IntraVENous Q6H PRN Kenwood, Eugenio, PA-C        insulin lispro (HUMALOG,ADMELOG) injection vial 0-8 Units  0-8 Units SubCUTAneous TID WC Garret, Eugenio, PA-C   4 Units at 05/28/24 1636    insulin lispro (HUMALOG,ADMELOG) injection vial 0-4 Units  0-4 Units SubCUTAneous Nightly Garret, Eugenio, PA-C        dextrose bolus 10% 125 mL  125 mL IntraVENous PRN Kenwood, Eugenio, PA-C        Or    dextrose bolus 10% 250 mL  250 mL IntraVENous PRN Garret, Eugenio, PA-C        glucagon injection 1 mg  1 mg SubCUTAneous PRN Garret, Eugenio, PA-C        dextrose 10 % infusion   IntraVENous Continuous PRN Kenwood, Eugenio, PA-C        Glucose (TRUEPLUS) oral gel 15 g  15 g Oral PRN Kenwood, Eugenio, PA-C          sodium chloride      dextrose         Physical Exam:  BP (!) 121/56   Pulse 69   Temp 98 °F (36.7 °C) (Oral)   Resp 16   Ht 1.753 m (5' 9\")   Wt 123.6 kg (272 lb 6.4 oz)   SpO2 96%   BMI 40.23 kg/m²   Weight change:   Wt Readings from Last 3 Encounters:   05/28/24 123.6 kg (272 lb 6.4 oz)   05/23/24 122.9 kg (271 lb)   04/17/24 125.6 kg (277 lb)     General: Awake, alert, oriented x3, no acute distress  Neck: No JVD, carotid bruits, thyromegaly, or lymphadenopathy  Cardiac: Regular rate and rhythm, normal S1 and split S2, no murmurs, no S3 or S4, no pericardial rubs.  Carotid upstrokes brisk  Resp: clear bilaterally without  wheezes, rhonchi, or rales; unlabored respirations  Abdomen: soft, nontender, nondistended, BS+; no masses, bruits, or hepatomegaly  Extremities: no cyanosis, clubbing, or edema.  Distal pulses intact  Skin: Warm and dry, no rashes or lesions  Neuro: moves all extremities to command, no focal deficits noted    Intake/Output:    Intake/Output Summary (Last 24 hours) at 5/29/2024 1042  Last data filed at 5/29/2024 0650  Gross per 24 hour   Intake --   Output 1150 ml   Net -1150 ml     No intake/output data recorded.    Laboratory Tests:  Last 3 CBC:  Recent Labs     05/27/24 2143 05/28/24  0426 05/29/24  0649   WBC 14.6* 13.4* 10.3   RBC 4.40 4.21 4.40   HGB 12.2* 11.3* 12.0*   HCT 37.9 36.2* 38.0   MCV 86.1 86.0 86.4   MCH 27.7 26.8 27.3   MCHC 32.2 31.2* 31.6*   RDW 15.3* 15.4* 15.4*    223 196   MPV 10.8 11.1 11.2       Last 3 CMP:    Recent Labs     05/27/24 2143 05/28/24  0426 05/28/24  1610 05/29/24  0649    134 133 134   K 4.5 4.5 4.4 4.2   CL 95* 99 96* 99   CO2 25 24 22 24   BUN 31* 34* 36* 33*   CREATININE 1.1 1.5* 1.5* 1.2   GLUCOSE 135* 144* 266* 140*   CALCIUM 9.8 9.1 8.9 9.3   BILITOT 0.8 0.8  --  0.8   ALKPHOS 63 56  --  62   AST 51* 57*  --  66*   ALT 48* 49*  --  71*       Last 3 Mag/Phos:  No results for input(s): \"MG\", \"PHOS\" in the last 72 hours.    Last 3 CK, CKMB, Troponin  No results for input(s): \"CKTOTAL\", \"CKMB\", \"TROPONINI\" in the last 72 hours.    Last 3 BNP:  No results for input(s): \"BNP\" in the last 72 hours.  No results found for: \"BNP\"    Last 3 Glucose:     Recent Labs     05/28/24  0426 05/28/24  1610 05/29/24  0649   GLUCOSE 144* 266* 140*       Last 3 Coags:  No results for input(s): \"PROTIME\", \"INR\" in the last 72 hours.    Invalid input(s): \"PTT\"  Lab Results   Component Value Date/Time    PROTIME 12.9 04/10/2015 07:57 PM    INR 1.2 04/10/2015 07:57 PM       Last 3 Lipid Panel:  Recent Labs     05/28/24  0426   HDL 48   TRIG 152*     No components found for:

## 2024-05-29 NOTE — PROGRESS NOTES
Discharge instructions given to patient -wife present. Verbalized understanding. Ambulatory and prefers to walk out to car.

## 2024-05-29 NOTE — PLAN OF CARE
Problem: Discharge Planning  Goal: Discharge to home or other facility with appropriate resources  5/29/2024 1143 by Cristiana Vincent RN  Outcome: Adequate for Discharge  5/28/2024 2304 by Siva Davenport RN  Outcome: Progressing     Problem: Pain  Goal: Verbalizes/displays adequate comfort level or baseline comfort level  5/29/2024 1143 by Cristiana Vincetn RN  Outcome: Adequate for Discharge  5/28/2024 2304 by Siva Davenport RN  Outcome: Progressing     Problem: Safety - Adult  Goal: Free from fall injury  5/29/2024 1143 by Cristiana Vincent RN  Outcome: Adequate for Discharge  5/28/2024 2304 by Siva Davenport RN  Outcome: Progressing     Problem: ABCDS Injury Assessment  Goal: Absence of physical injury  5/29/2024 1143 by Cristiana Vincent RN  Outcome: Adequate for Discharge  5/28/2024 2304 by Siva Davenport RN  Outcome: Progressing     Problem: Chronic Conditions and Co-morbidities  Goal: Patient's chronic conditions and co-morbidity symptoms are monitored and maintained or improved  Outcome: Adequate for Discharge

## 2024-05-29 NOTE — PLAN OF CARE
Problem: Discharge Planning  Goal: Discharge to home or other facility with appropriate resources  5/29/2024 1502 by Cristiana Vincent RN  Outcome: Completed  5/29/2024 1143 by Cristiana Vincent RN  Outcome: Adequate for Discharge     Problem: Pain  Goal: Verbalizes/displays adequate comfort level or baseline comfort level  5/29/2024 1502 by Cristiana Vincent RN  Outcome: Completed  5/29/2024 1143 by Cristiana Vincent RN  Outcome: Adequate for Discharge     Problem: Safety - Adult  Goal: Free from fall injury  5/29/2024 1502 by Cristiana Vincent RN  Outcome: Completed  5/29/2024 1143 by Cristiana Vincent RN  Outcome: Adequate for Discharge     Problem: ABCDS Injury Assessment  Goal: Absence of physical injury  5/29/2024 1502 by Cristiana Vincent RN  Outcome: Completed  5/29/2024 1143 by Cristiana Vincent RN  Outcome: Adequate for Discharge     Problem: Chronic Conditions and Co-morbidities  Goal: Patient's chronic conditions and co-morbidity symptoms are monitored and maintained or improved  5/29/2024 1502 by Cristiana Vincent RN  Outcome: Completed  5/29/2024 1143 by Cristiana Vincent RN  Outcome: Adequate for Discharge

## 2024-06-01 LAB
EKG ATRIAL RATE: 68 BPM
EKG P AXIS: 24 DEGREES
EKG P-R INTERVAL: 160 MS
EKG Q-T INTERVAL: 396 MS
EKG QRS DURATION: 76 MS
EKG QTC CALCULATION (BAZETT): 421 MS
EKG R AXIS: -16 DEGREES
EKG T AXIS: 41 DEGREES
EKG VENTRICULAR RATE: 68 BPM

## 2024-07-13 ENCOUNTER — HOSPITAL ENCOUNTER (EMERGENCY)
Age: 74
Discharge: HOME | End: 2024-07-13
Attending: EMERGENCY MEDICINE
Payer: MEDICARE

## 2024-07-13 VITALS
SYSTOLIC BLOOD PRESSURE: 157 MMHG | OXYGEN SATURATION: 98 % | RESPIRATION RATE: 16 BRPM | HEART RATE: 86 BPM | DIASTOLIC BLOOD PRESSURE: 87 MMHG | TEMPERATURE: 97.7 F

## 2024-07-13 DIAGNOSIS — G89.29 CHRONIC LOW BACK PAIN, UNSPECIFIED BACK PAIN LATERALITY, UNSPECIFIED WHETHER SCIATICA PRESENT: Primary | ICD-10-CM

## 2024-07-13 DIAGNOSIS — M54.50 CHRONIC LOW BACK PAIN, UNSPECIFIED BACK PAIN LATERALITY, UNSPECIFIED WHETHER SCIATICA PRESENT: Primary | ICD-10-CM

## 2024-07-13 DIAGNOSIS — M54.30 SCIATICA, UNSPECIFIED LATERALITY: ICD-10-CM

## 2024-07-13 PROCEDURE — 6360000002 HC RX W HCPCS: Performed by: EMERGENCY MEDICINE

## 2024-07-13 PROCEDURE — 96372 THER/PROPH/DIAG INJ SC/IM: CPT

## 2024-07-13 PROCEDURE — 6370000000 HC RX 637 (ALT 250 FOR IP): Performed by: EMERGENCY MEDICINE

## 2024-07-13 PROCEDURE — 99284 EMERGENCY DEPT VISIT MOD MDM: CPT

## 2024-07-13 RX ORDER — OXYCODONE HYDROCHLORIDE AND ACETAMINOPHEN 5; 325 MG/1; MG/1
1 TABLET ORAL ONCE
Status: COMPLETED | OUTPATIENT
Start: 2024-07-13 | End: 2024-07-13

## 2024-07-13 RX ORDER — KETOROLAC TROMETHAMINE 30 MG/ML
30 INJECTION, SOLUTION INTRAMUSCULAR; INTRAVENOUS ONCE
Status: COMPLETED | OUTPATIENT
Start: 2024-07-13 | End: 2024-07-13

## 2024-07-13 RX ORDER — MORPHINE SULFATE 15 MG/1
15 TABLET, FILM COATED, EXTENDED RELEASE ORAL 2 TIMES DAILY
COMMUNITY

## 2024-07-13 RX ORDER — DEXAMETHASONE SODIUM PHOSPHATE 10 MG/ML
10 INJECTION INTRAMUSCULAR; INTRAVENOUS ONCE
Status: COMPLETED | OUTPATIENT
Start: 2024-07-13 | End: 2024-07-13

## 2024-07-13 RX ADMIN — OXYCODONE HYDROCHLORIDE AND ACETAMINOPHEN 1 TABLET: 5; 325 TABLET ORAL at 22:34

## 2024-07-13 RX ADMIN — KETOROLAC TROMETHAMINE 30 MG: 30 INJECTION, SOLUTION INTRAMUSCULAR at 22:35

## 2024-07-13 RX ADMIN — DEXAMETHASONE SODIUM PHOSPHATE 10 MG: 10 INJECTION INTRAMUSCULAR; INTRAVENOUS at 22:35

## 2024-07-13 ASSESSMENT — PAIN SCALES - GENERAL: PAINLEVEL_OUTOF10: 10

## 2024-07-13 ASSESSMENT — PAIN DESCRIPTION - DESCRIPTORS: DESCRIPTORS: PINS AND NEEDLES;NUMBNESS

## 2024-07-13 ASSESSMENT — PAIN DESCRIPTION - LOCATION: LOCATION: LEG

## 2024-07-13 ASSESSMENT — PAIN - FUNCTIONAL ASSESSMENT: PAIN_FUNCTIONAL_ASSESSMENT: INTOLERABLE, UNABLE TO DO ANY ACTIVE OR PASSIVE ACTIVITIES

## 2024-07-13 ASSESSMENT — PAIN DESCRIPTION - ORIENTATION: ORIENTATION: RIGHT;LEFT

## 2024-07-14 NOTE — ED PROVIDER NOTES
HPI:  7/13/24,   Time: 10:28 PM EDT         Javier Justin Sr. is a 73 y.o. male presenting to the ED for chronic back pain and rating down his leg for, beginning several years ago.  The complaint has been persistent, moderate in severity, and worsened by nothing.  This is not new he has no new neurodeficit no belly pain his pulses are equal bilateral is been no recent trauma he is actually in pain management I did give him a shot of Toradol and Decadron his kidney function has been normal I did tell him that since he is a diabetic that he needs to watch his blood sugars that they will be elevated he has had steroids before and he said it did not work and he was comfortable with his sugar being high and eating close follow-up his sugars was 107 at home told he has any other symptoms he needs to return immediately to the emergency department this is not a new problem    ROS:   Pertinent positives and negatives are stated within HPI, all other systems reviewed and are negative.  --------------------------------------------- PAST HISTORY ---------------------------------------------  Past Medical History:  has a past medical history of Anxiety, Back pain, Diabetes mellitus (HCC), Hyperlipidemia, Hypertension, and Sciatica.    Past Surgical History:  has a past surgical history that includes back surgery; knee surgery; other surgical history (5/8/2014); Carpal tunnel release; and hip surgery (Right).    Social History:  reports that he quit smoking about 10 years ago. His smoking use included cigarettes. He started smoking about 30 years ago. He has a 20.0 pack-year smoking history. He has never used smokeless tobacco. He reports that he does not drink alcohol and does not use drugs.    Family History: family history is not on file.     The patient’s home medications have been reviewed.    Allergies: Patient has no known allergies.    -------------------------------------------------- RESULTS

## 2024-07-14 NOTE — DISCHARGE INSTRUCTIONS
You should return to me if any new symptoms you should get an MRI of your back and follow-up with spine surgery soon as possible return if any bowel or bladder incontinence

## 2024-08-29 ENCOUNTER — HOSPITAL ENCOUNTER (EMERGENCY)
Age: 74
Discharge: HOME | End: 2024-08-29
Attending: EMERGENCY MEDICINE
Payer: MEDICARE

## 2024-08-29 VITALS
HEART RATE: 71 BPM | RESPIRATION RATE: 16 BRPM | HEIGHT: 69 IN | TEMPERATURE: 97.9 F | SYSTOLIC BLOOD PRESSURE: 147 MMHG | OXYGEN SATURATION: 94 % | WEIGHT: 273 LBS | DIASTOLIC BLOOD PRESSURE: 72 MMHG | BODY MASS INDEX: 40.43 KG/M2

## 2024-08-29 DIAGNOSIS — M54.50 ACUTE EXACERBATION OF CHRONIC LOW BACK PAIN: Primary | ICD-10-CM

## 2024-08-29 DIAGNOSIS — G89.29 ACUTE EXACERBATION OF CHRONIC LOW BACK PAIN: Primary | ICD-10-CM

## 2024-08-29 PROCEDURE — 6360000002 HC RX W HCPCS: Performed by: EMERGENCY MEDICINE

## 2024-08-29 PROCEDURE — 96372 THER/PROPH/DIAG INJ SC/IM: CPT

## 2024-08-29 PROCEDURE — 6370000000 HC RX 637 (ALT 250 FOR IP): Performed by: EMERGENCY MEDICINE

## 2024-08-29 PROCEDURE — 99284 EMERGENCY DEPT VISIT MOD MDM: CPT

## 2024-08-29 RX ORDER — PREDNISONE 20 MG/1
60 TABLET ORAL ONCE
Status: COMPLETED | OUTPATIENT
Start: 2024-08-29 | End: 2024-08-29

## 2024-08-29 RX ORDER — METHOCARBAMOL 500 MG/1
750 TABLET, FILM COATED ORAL ONCE
Status: COMPLETED | OUTPATIENT
Start: 2024-08-29 | End: 2024-08-29

## 2024-08-29 RX ORDER — KETOROLAC TROMETHAMINE 30 MG/ML
30 INJECTION, SOLUTION INTRAMUSCULAR; INTRAVENOUS ONCE
Status: COMPLETED | OUTPATIENT
Start: 2024-08-29 | End: 2024-08-29

## 2024-08-29 RX ORDER — PREDNISONE 50 MG/1
50 TABLET ORAL DAILY
Qty: 5 TABLET | Refills: 0 | Status: SHIPPED | OUTPATIENT
Start: 2024-08-29 | End: 2024-09-03

## 2024-08-29 RX ORDER — LIDOCAINE 4 G/G
1 PATCH TOPICAL DAILY
Status: DISCONTINUED | OUTPATIENT
Start: 2024-08-29 | End: 2024-08-29 | Stop reason: HOSPADM

## 2024-08-29 RX ADMIN — METHOCARBAMOL 750 MG: 500 TABLET ORAL at 16:39

## 2024-08-29 RX ADMIN — PREDNISONE 60 MG: 20 TABLET ORAL at 16:39

## 2024-08-29 RX ADMIN — KETOROLAC TROMETHAMINE 30 MG: 30 INJECTION, SOLUTION INTRAMUSCULAR at 16:37

## 2024-08-29 ASSESSMENT — PAIN DESCRIPTION - DESCRIPTORS
DESCRIPTORS: ACHING

## 2024-08-29 ASSESSMENT — PAIN DESCRIPTION - FREQUENCY
FREQUENCY: CONTINUOUS
FREQUENCY: CONTINUOUS

## 2024-08-29 ASSESSMENT — PAIN SCALES - GENERAL
PAINLEVEL_OUTOF10: 2
PAINLEVEL_OUTOF10: 7
PAINLEVEL_OUTOF10: 8
PAINLEVEL_OUTOF10: 6

## 2024-08-29 ASSESSMENT — PAIN DESCRIPTION - LOCATION
LOCATION: BACK

## 2024-08-29 ASSESSMENT — PAIN DESCRIPTION - ONSET
ONSET: ON-GOING
ONSET: ON-GOING

## 2024-08-29 ASSESSMENT — PAIN DESCRIPTION - PAIN TYPE: TYPE: ACUTE PAIN;CHRONIC PAIN

## 2024-08-29 NOTE — ED PROVIDER NOTES
[facility] EMERGENCY DEPARTMENT  EMERGENCY DEPARTMENT ENCOUNTER        Pt Name: Javier Justin Sr.  MRN: [id number]  Birthdate 1950  Date of evaluation: 8/29/2024  Provider: Olivia Mcguire DO  PCP: Raúl Farias MD  Note Started: 4:30 PM EDT 8/29/24    CHIEF COMPLAINT       Chief Complaint   Patient presents with    Back Pain     Chronic        HISTORY OF PRESENT ILLNESS: 1 or more Elements   History From: Patient    Limitations to history : None    Javier Justin Sr. is a 74 y.o. male who presents with concern for acute on chronic low back pain.  He notes he does have a history of significant thoracic and lumbar disease and has had multiple operations in the past as he is fused from T12 down essentially he notes.  He says every so often his sciatica will flare up, acute down his right leg.  He did not fall or have any other injuries or trauma recently, no urinary retention, bowel incontinence, numbness, weakness, tingling from baseline.  Has been able to ambulate but his back always gets exacerbated.  This particular episode has been worsening over the past 6 days.  No other associations.      EXTERNAL NOTE REVIEW:      On chart review was last admitted to the hospital for chest pain on 5/27/2024    REVIEW OF SYSTEMS :      Positives and Pertinent negatives as per HPI.     SURGICAL HISTORY     Past Surgical History:   Procedure Laterality Date    BACK SURGERY      CARPAL TUNNEL RELEASE      HIP SURGERY Right     KNEE SURGERY      OTHER SURGICAL HISTORY  5/8/2014    lumbar myelogram       CURRENTMEDICATIONS       Previous Medications    ALPRAZOLAM (XANAX) 0.5 MG TABLET    Take 1 tablet by mouth nightly as needed for Sleep    CLONIDINE (CATAPRES) 0.1 MG TABLET    TAKE 1 TABLET TWICE A DAY    FLUOXETINE (PROZAC) 20 MG CAPSULE    Take 2 capsules by mouth 2 times daily    GLUCOSE BLOOD VI TEST STRIPS (AUTUMN CONTOUR NEXT TEST) STRIP    1 each by In Vitro route 3 times daily As               Constitutional/General: Alert and oriented x3  Head: Normocephalic and atraumatic  Eyes: PERRL, EOMI, conjunctiva normal, sclera non icteric  ENT:  Oropharynx clear, handling secretions, no trismus, no asymmetry of the posterior oropharynx or uvular edema  Neck: Supple, full ROM, no stridor, no meningeal signs  Respiratory: Lungs clear to auscultation bilaterally, no wheezes, rales, or rhonchi. Not in respiratory distress  Cardiovascular:  Regular rate. Regular rhythm. 2+ distal pulses. Equal extremity pulses.   Chest: No chest wall tenderness  GI:  Abdomen Soft, Non tender, Non distended.  No rebound, guarding, or rigidity. No pulsatile masses.  Musculoskeletal: Moves all extremities x 4. Warm and well perfused, no cyanosis, no edema. Capillary refill <3 seconds  Integument: skin warm and dry. No rashes.   Neurologic: GCS 15, no focal deficits, symmetric strength 5/5 in the upper and lower extremities bilaterally, straight leg raise positive on the right  Psychiatric: Normal Affect            DIAGNOSTIC RESULTS   LABS:    Labs Reviewed - No data to display    As interpreted by me, the above displayed labs are abnormal. All other labs obtained during this visit were within normal range or not returned as of this dictation.    RADIOLOGY:   Radiology studies interpreted by the radiologist unless otherwise specified.      No orders to display     No results found.    No results found.    PROCEDURES   Unless otherwise noted below, none     PAST MEDICAL HISTORY/Chronic Conditions Affecting Care      has a past medical history of Anxiety, Back pain, Diabetes mellitus (HCC), Hyperlipidemia, Hypertension, and Sciatica.     EMERGENCY DEPARTMENT COURSE    Vitals:    Vitals:    08/29/24 1535 08/29/24 1551   BP: (!) 147/72    Pulse: 71    Resp: 16    Temp: 97.9 °F (36.6 °C)    TempSrc: Oral    SpO2: 94%    Weight:  123.8 kg (273 lb)   Height:  1.753 m (5' 9\")       Patient was given the following

## 2024-09-30 LAB
ESTIMATED AVERAGE GLUCOSE: NORMAL
HBA1C MFR BLD: 7.6 %

## 2024-10-01 ENCOUNTER — APPOINTMENT (OUTPATIENT)
Dept: CT IMAGING | Age: 74
End: 2024-10-01
Payer: MEDICARE

## 2024-10-01 ENCOUNTER — HOSPITAL ENCOUNTER (EMERGENCY)
Age: 74
Discharge: HOME | End: 2024-10-01
Payer: MEDICARE

## 2024-10-01 ENCOUNTER — APPOINTMENT (OUTPATIENT)
Dept: ULTRASOUND IMAGING | Age: 74
End: 2024-10-01
Payer: MEDICARE

## 2024-10-01 ENCOUNTER — APPOINTMENT (OUTPATIENT)
Dept: GENERAL RADIOLOGY | Age: 74
End: 2024-10-01
Payer: MEDICARE

## 2024-10-01 VITALS
RESPIRATION RATE: 14 BRPM | TEMPERATURE: 97.9 F | BODY MASS INDEX: 40.32 KG/M2 | OXYGEN SATURATION: 96 % | DIASTOLIC BLOOD PRESSURE: 62 MMHG | SYSTOLIC BLOOD PRESSURE: 102 MMHG | WEIGHT: 273 LBS | HEART RATE: 77 BPM
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
## 2024-10-01 DIAGNOSIS — S86.911A KNEE STRAIN, RIGHT, INITIAL ENCOUNTER: ICD-10-CM

## 2024-10-01 DIAGNOSIS — M25.461 EFFUSION OF RIGHT KNEE JOINT: ICD-10-CM

## 2024-10-01 DIAGNOSIS — E83.42 HYPOMAGNESEMIA: ICD-10-CM

## 2024-10-01 DIAGNOSIS — K76.9 LESION OF LIVER: Primary | ICD-10-CM

## 2024-10-01 DIAGNOSIS — R79.89 ELEVATED D-DIMER: ICD-10-CM

## 2024-10-01 LAB
ALBUMIN SERPL-MCNC: 4.4 G/DL (ref 3.5–5.2)
ALP SERPL-CCNC: 79 U/L (ref 40–129)
ALT SERPL-CCNC: 41 U/L (ref 0–40)
ANION GAP SERPL CALCULATED.3IONS-SCNC: 16 MMOL/L (ref 7–16)
ANION GAP SERPL CALCULATED.3IONS-SCNC: 19 MMOL/L (ref 7–16)
AST SERPL-CCNC: 51 U/L (ref 0–39)
BASOPHILS # BLD: 0.07 K/UL (ref 0–0.2)
BASOPHILS NFR BLD: 1 % (ref 0–2)
BILIRUB SERPL-MCNC: 0.4 MG/DL (ref 0–1.2)
BILIRUB UR QL STRIP: NEGATIVE
BUN SERPL-MCNC: 22 MG/DL (ref 6–23)
BUN SERPL-MCNC: 23 MG/DL (ref 6–23)
CALCIUM SERPL-MCNC: 9.4 MG/DL (ref 8.6–10.2)
CALCIUM SERPL-MCNC: 9.9 MG/DL (ref 8.6–10.2)
CHLORIDE SERPL-SCNC: 95 MMOL/L (ref 98–107)
CHLORIDE SERPL-SCNC: 98 MMOL/L (ref 98–107)
CLARITY UR: CLEAR
CO2 SERPL-SCNC: 21 MMOL/L (ref 22–29)
CO2 SERPL-SCNC: 22 MMOL/L (ref 22–29)
COLOR UR: YELLOW
CREAT SERPL-MCNC: 1.2 MG/DL (ref 0.7–1.2)
CREAT SERPL-MCNC: 1.3 MG/DL (ref 0.7–1.2)
D-DIMER QUANTITATIVE: 400 NG/ML DDU (ref 0–230)
EKG ATRIAL RATE: 67 BPM
EKG P AXIS: 49 DEGREES
EKG P-R INTERVAL: 184 MS
EKG Q-T INTERVAL: 402 MS
EKG QRS DURATION: 92 MS
EKG QTC CALCULATION (BAZETT): 424 MS
EKG R AXIS: -7 DEGREES
EKG T AXIS: 37 DEGREES
EKG VENTRICULAR RATE: 67 BPM
EOSINOPHIL # BLD: 0.24 K/UL (ref 0.05–0.5)
EOSINOPHILS RELATIVE PERCENT: 2 % (ref 0–6)
ERYTHROCYTE [DISTWIDTH] IN BLOOD BY AUTOMATED COUNT: 14.1 % (ref 11.5–15)
GFR, ESTIMATED: 59 ML/MIN/1.73M2
GFR, ESTIMATED: 66 ML/MIN/1.73M2
GLUCOSE SERPL-MCNC: 117 MG/DL (ref 74–99)
GLUCOSE SERPL-MCNC: 99 MG/DL (ref 74–99)
GLUCOSE UR STRIP-MCNC: NEGATIVE MG/DL
HCT VFR BLD AUTO: 33.6 % (ref 37–54)
HGB BLD-MCNC: 10.9 G/DL (ref 12.5–16.5)
HGB UR QL STRIP.AUTO: NEGATIVE
IMM GRANULOCYTES # BLD AUTO: 0.09 K/UL (ref 0–0.58)
IMM GRANULOCYTES NFR BLD: 1 % (ref 0–5)
KETONES UR STRIP-MCNC: NEGATIVE MG/DL
LEUKOCYTE ESTERASE UR QL STRIP: NEGATIVE
LYMPHOCYTES NFR BLD: 2.9 K/UL (ref 1.5–4)
LYMPHOCYTES RELATIVE PERCENT: 28 % (ref 20–42)
MAGNESIUM SERPL-MCNC: 1.3 MG/DL (ref 1.6–2.6)
MCH RBC QN AUTO: 27.1 PG (ref 26–35)
MCHC RBC AUTO-ENTMCNC: 32.4 G/DL (ref 32–34.5)
MCV RBC AUTO: 83.6 FL (ref 80–99.9)
MONOCYTES NFR BLD: 0.92 K/UL (ref 0.1–0.95)
MONOCYTES NFR BLD: 9 % (ref 2–12)
NEUTROPHILS NFR BLD: 59 % (ref 43–80)
NEUTS SEG NFR BLD: 6.11 K/UL (ref 1.8–7.3)
NITRITE UR QL STRIP: NEGATIVE
PH UR STRIP: 5.5 [PH] (ref 5–9)
PLATELET # BLD AUTO: 259 K/UL (ref 130–450)
PMV BLD AUTO: 11.2 FL (ref 7–12)
POTASSIUM SERPL-SCNC: 5 MMOL/L (ref 3.5–5)
POTASSIUM SERPL-SCNC: 5.3 MMOL/L (ref 3.5–5)
POTASSIUM SERPL-SCNC: 5.7 MMOL/L (ref 3.5–5)
PROT SERPL-MCNC: 7.6 G/DL (ref 6.4–8.3)
PROT UR STRIP-MCNC: NEGATIVE MG/DL
RBC # BLD AUTO: 4.02 M/UL (ref 3.8–5.8)
RBC #/AREA URNS HPF: NORMAL /HPF
SODIUM SERPL-SCNC: 135 MMOL/L (ref 132–146)
SODIUM SERPL-SCNC: 136 MMOL/L (ref 132–146)
SP GR UR STRIP: 1.01 (ref 1–1.03)
TROPONIN I SERPL HS-MCNC: 21 NG/L (ref 0–11)
TROPONIN I SERPL HS-MCNC: 25 NG/L (ref 0–11)
UROBILINOGEN UR STRIP-ACNC: 0.2 EU/DL (ref 0–1)
WBC #/AREA URNS HPF: NORMAL /HPF
WBC OTHER # BLD: 10.3 K/UL (ref 4.5–11.5)

## 2024-10-01 PROCEDURE — 83735 ASSAY OF MAGNESIUM: CPT

## 2024-10-01 PROCEDURE — 6370000000 HC RX 637 (ALT 250 FOR IP): Performed by: NURSE PRACTITIONER

## 2024-10-01 PROCEDURE — 96360 HYDRATION IV INFUSION INIT: CPT

## 2024-10-01 PROCEDURE — 73590 X-RAY EXAM OF LOWER LEG: CPT

## 2024-10-01 PROCEDURE — 85379 FIBRIN DEGRADATION QUANT: CPT

## 2024-10-01 PROCEDURE — 96361 HYDRATE IV INFUSION ADD-ON: CPT

## 2024-10-01 PROCEDURE — 71275 CT ANGIOGRAPHY CHEST: CPT

## 2024-10-01 PROCEDURE — 84484 ASSAY OF TROPONIN QUANT: CPT

## 2024-10-01 PROCEDURE — 84132 ASSAY OF SERUM POTASSIUM: CPT

## 2024-10-01 PROCEDURE — 73560 X-RAY EXAM OF KNEE 1 OR 2: CPT

## 2024-10-01 PROCEDURE — 93010 ELECTROCARDIOGRAM REPORT: CPT | Performed by: INTERNAL MEDICINE

## 2024-10-01 PROCEDURE — 70450 CT HEAD/BRAIN W/O DYE: CPT

## 2024-10-01 PROCEDURE — 81001 URINALYSIS AUTO W/SCOPE: CPT

## 2024-10-01 PROCEDURE — 6360000004 HC RX CONTRAST MEDICATION: Performed by: RADIOLOGY

## 2024-10-01 PROCEDURE — 93971 EXTREMITY STUDY: CPT

## 2024-10-01 PROCEDURE — 99285 EMERGENCY DEPT VISIT HI MDM: CPT

## 2024-10-01 PROCEDURE — 80053 COMPREHEN METABOLIC PANEL: CPT

## 2024-10-01 PROCEDURE — 2580000003 HC RX 258: Performed by: NURSE PRACTITIONER

## 2024-10-01 PROCEDURE — 85025 COMPLETE CBC W/AUTO DIFF WBC: CPT

## 2024-10-01 PROCEDURE — 71045 X-RAY EXAM CHEST 1 VIEW: CPT

## 2024-10-01 PROCEDURE — 93005 ELECTROCARDIOGRAM TRACING: CPT | Performed by: NURSE PRACTITIONER

## 2024-10-01 PROCEDURE — 80048 BASIC METABOLIC PNL TOTAL CA: CPT

## 2024-10-01 RX ORDER — 0.9 % SODIUM CHLORIDE 0.9 %
1000 INTRAVENOUS SOLUTION INTRAVENOUS ONCE
Status: COMPLETED | OUTPATIENT
Start: 2024-10-01 | End: 2024-10-01

## 2024-10-01 RX ORDER — MAGNESIUM OXIDE 400 MG/1
400 TABLET ORAL DAILY
Qty: 3 TABLET | Refills: 0 | Status: SHIPPED | OUTPATIENT
Start: 2024-10-01

## 2024-10-01 RX ORDER — 0.9 % SODIUM CHLORIDE 0.9 %
1000 INTRAVENOUS SOLUTION INTRAVENOUS ONCE
Status: DISCONTINUED | OUTPATIENT
Start: 2024-10-01 | End: 2024-10-01

## 2024-10-01 RX ORDER — IOPAMIDOL 755 MG/ML
75 INJECTION, SOLUTION INTRAVASCULAR
Status: COMPLETED | OUTPATIENT
Start: 2024-10-01 | End: 2024-10-01

## 2024-10-01 RX ORDER — HYDROCODONE BITARTRATE AND ACETAMINOPHEN 5; 325 MG/1; MG/1
1 TABLET ORAL ONCE
Status: COMPLETED | OUTPATIENT
Start: 2024-10-01 | End: 2024-10-01

## 2024-10-01 RX ADMIN — IOPAMIDOL 75 ML: 755 INJECTION, SOLUTION INTRAVENOUS at 14:20

## 2024-10-01 RX ADMIN — SODIUM CHLORIDE 1000 ML: 9 INJECTION, SOLUTION INTRAVENOUS at 11:30

## 2024-10-01 RX ADMIN — HYDROCODONE BITARTRATE AND ACETAMINOPHEN 1 TABLET: 5; 325 TABLET ORAL at 16:03

## 2024-10-01 RX ADMIN — SODIUM CHLORIDE 1000 ML: 9 INJECTION, SOLUTION INTRAVENOUS at 16:24

## 2024-10-01 RX ADMIN — HYDROCODONE BITARTRATE AND ACETAMINOPHEN 1 TABLET: 5; 325 TABLET ORAL at 13:43

## 2024-10-01 ASSESSMENT — PAIN - FUNCTIONAL ASSESSMENT
PAIN_FUNCTIONAL_ASSESSMENT: PREVENTS OR INTERFERES WITH MANY ACTIVE NOT PASSIVE ACTIVITIES
PAIN_FUNCTIONAL_ASSESSMENT: 0-10

## 2024-10-01 ASSESSMENT — PAIN DESCRIPTION - DESCRIPTORS: DESCRIPTORS: DISCOMFORT;ACHING

## 2024-10-01 ASSESSMENT — PAIN DESCRIPTION - PAIN TYPE: TYPE: ACUTE PAIN

## 2024-10-01 ASSESSMENT — PAIN DESCRIPTION - ORIENTATION
ORIENTATION: RIGHT
ORIENTATION: RIGHT

## 2024-10-01 ASSESSMENT — PAIN DESCRIPTION - LOCATION
LOCATION: LEG
LOCATION: BACK;LEG

## 2024-10-01 ASSESSMENT — PAIN DESCRIPTION - ONSET: ONSET: SUDDEN

## 2024-10-01 ASSESSMENT — PAIN SCALES - GENERAL
PAINLEVEL_OUTOF10: 8
PAINLEVEL_OUTOF10: 8
PAINLEVEL_OUTOF10: 10

## 2024-10-01 ASSESSMENT — PAIN DESCRIPTION - FREQUENCY: FREQUENCY: CONTINUOUS

## 2024-10-01 NOTE — ED PROVIDER NOTES
Tc from noah at dr Cesilia Hayden office , [phone]  Reports dr Cesilia Hayden feels patient may be a candidate for epidural injections  No date  scheduled as yet  She would require 7 day hold for warfarin prior to injections  Requested dr Juanita Rowe office fax a medication hold/clearance request to our office for dr Jennifer Lopez to complete  Our fax number was provided to noah Hayden office 's fax number is [phone]  deep venous structures of the right extremity. COMPARISON: None. HISTORY: ORDERING SYSTEM PROVIDED HISTORY: Edema right entire leg FINDINGS: The visualized veins of the right lower extremity are patent and free of echogenic thrombus. The veins demonstrate good compressibility with normal color flow study and spectral analysis.     No evidence of DVT in the right lower extremity.     XR TIBIA FIBULA RIGHT (2 VIEWS)    Result Date: 10/1/2024  EXAMINATION: XRAY VIEWS OF THE RIGHT TIBIA AND FIBULA 10/1/2024 11:29 am COMPARISON: None. HISTORY: ORDERING SYSTEM PROVIDED HISTORY: fall,pain TECHNOLOGIST PROVIDED HISTORY: Reason for exam:->fall,pain FINDINGS: There is no evidence of acute fracture.  There is normal alignment.  No acute joint abnormality.  No focal osseous lesion. No focal soft tissue abnormality.  Total knee prosthesis is intact.     No acute osseous abnormality. No evidence of total knee prosthetic complications.     XR KNEE RIGHT (1-2 VIEWS)    Result Date: 10/1/2024  EXAMINATION: TWO XRAY VIEWS OF THE RIGHT KNEE 10/1/2024 11:29 am COMPARISON: None. HISTORY: ORDERING SYSTEM PROVIDED HISTORY: fall,pain TECHNOLOGIST PROVIDED HISTORY: Reason for exam:->fall,pain FINDINGS: No acute bony abnormalities.  Total knee prosthesis in anatomic alignment. Suspect mild joint effusion.     No acute bony abnormalities. Total knee prosthesis in anatomic alignment. Suspect mild joint effusion.     XR CHEST PORTABLE    Result Date: 10/1/2024  EXAMINATION: ONE XRAY VIEW OF THE CHEST 10/1/2024 11:29 am COMPARISON: March 27, 2024 HISTORY: ORDERING SYSTEM PROVIDED HISTORY: chest pain TECHNOLOGIST PROVIDED HISTORY: Reason for exam:->chest pain FINDINGS: The lungs are without acute focal process.  There is no effusion or pneumothorax. The cardiomediastinal silhouette is without acute process. The osseous structures are without acute process.     No acute process. No significant interval change.       No results found.    PROCEDURES    Unless otherwise noted below, none     Procedures    CRITICAL CARE TIME   none    PAST MEDICAL HISTORY      has a past medical history of Anxiety, Back pain, Diabetes mellitus (HCC), Hyperlipidemia, Hypertension, and Sciatica.     EMERGENCY DEPARTMENT COURSE and DIFFERENTIAL DIAGNOSIS/MDM:   Vitals:    Vitals:    10/01/24 1010 10/01/24 1102   BP: 102/62    Pulse: 77    Resp: 14    Temp: 97.9 °F (36.6 °C)    TempSrc: Oral    SpO2: 96%    Weight:  123.8 kg (273 lb)       Patient was given the following medications:  Medications   sodium chloride 0.9 % bolus 1,000 mL (0 mLs IntraVENous Stopped 10/1/24 1331)   HYDROcodone-acetaminophen (NORCO) 5-325 MG per tablet 1 tablet (1 tablet Oral Given 10/1/24 1343)   iopamidol (ISOVUE-370) 76 % injection 75 mL (75 mLs IntraVENous Given 10/1/24 1420)   HYDROcodone-acetaminophen (NORCO) 5-325 MG per tablet 1 tablet (1 tablet Oral Given 10/1/24 1603)   sodium chloride 0.9 % bolus 1,000 mL (0 mLs IntraVENous Stopped 10/1/24 1816)       ED Course as of 10/01/24 1907 Tue Oct 01, 2024   1845 Patient is a show potassium level was 5.3 was given a liter of IV fluids it was rechecked and was 5.7.  Patient was given 1 more liter of normal saline BMP and magnesium level was then again rechecked potassium level is now 5.0 BUN and creatinine have improved as well as his GFR.  Patient's magnesium level is 1.3 states that he has been around those levels in the past.  At this time presents to the emergency does not have any p.o. magnesium however patient does not want to stay for IV magnesium supplementation at this time prescription for oral magnesium was called into the patient's pharmacy he will pick it up upon being discharged from the emergency department.  Patient states that he is feeling much better at this time patient is hemodynamically stable neurologically vascular [MM]      ED Course User Index  [MM] Lisa Weller, APRN - CNP            CONSULTS: (Who and What was

## 2024-10-15 ENCOUNTER — HOSPITAL ENCOUNTER (OUTPATIENT)
Age: 74
Discharge: HOME | End: 2024-10-17

## 2024-10-15 LAB
ABO + RH BLD: NORMAL
ARM BAND NUMBER: NORMAL
BLOOD BANK SAMPLE EXPIRATION: NORMAL
BLOOD GROUP ANTIBODIES SERPL: NEGATIVE

## 2024-10-15 PROCEDURE — 86901 BLOOD TYPING SEROLOGIC RH(D): CPT

## 2024-10-15 PROCEDURE — 86850 RBC ANTIBODY SCREEN: CPT

## 2024-10-15 PROCEDURE — 86900 BLOOD TYPING SEROLOGIC ABO: CPT

## 2024-10-22 ENCOUNTER — HOSPITAL ENCOUNTER (OUTPATIENT)
Age: 74
Discharge: HOME | End: 2024-10-24

## 2024-10-22 LAB
ANION GAP SERPL CALCULATED.3IONS-SCNC: 13 MMOL/L (ref 7–16)
ANION GAP SERPL CALCULATED.3IONS-SCNC: 15 MMOL/L (ref 7–16)
BILIRUB UR QL STRIP: NEGATIVE
BUN SERPL-MCNC: 27 MG/DL (ref 6–23)
BUN SERPL-MCNC: 27 MG/DL (ref 6–23)
CALCIUM SERPL-MCNC: 8.5 MG/DL (ref 8.6–10.2)
CALCIUM SERPL-MCNC: 8.6 MG/DL (ref 8.6–10.2)
CHLORIDE SERPL-SCNC: 99 MMOL/L (ref 98–107)
CHLORIDE SERPL-SCNC: 99 MMOL/L (ref 98–107)
CHLORIDE UR-SCNC: 68 MMOL/L
CLARITY UR: CLEAR
CO2 SERPL-SCNC: 19 MMOL/L (ref 22–29)
CO2 SERPL-SCNC: 21 MMOL/L (ref 22–29)
COLOR UR: YELLOW
CREAT SERPL-MCNC: 1.4 MG/DL (ref 0.7–1.2)
CREAT SERPL-MCNC: 1.4 MG/DL (ref 0.7–1.2)
CREAT UR-MCNC: 76.7 MG/DL (ref 40–278)
CRYSTALS URNS MICRO: ABNORMAL /HPF
ERYTHROCYTE [DISTWIDTH] IN BLOOD BY AUTOMATED COUNT: 14.7 % (ref 11.5–15)
GFR, ESTIMATED: 53 ML/MIN/1.73M2
GFR, ESTIMATED: 53 ML/MIN/1.73M2
GLUCOSE SERPL-MCNC: 250 MG/DL (ref 74–99)
GLUCOSE SERPL-MCNC: 257 MG/DL (ref 74–99)
GLUCOSE UR STRIP-MCNC: 500 MG/DL
HCT VFR BLD AUTO: 28.3 % (ref 37–54)
HCT VFR BLD AUTO: 31.6 % (ref 37–54)
HGB BLD-MCNC: 8.8 G/DL (ref 12.5–16.5)
HGB BLD-MCNC: 9.6 G/DL (ref 12.5–16.5)
HGB UR QL STRIP.AUTO: ABNORMAL
KETONES UR STRIP-MCNC: NEGATIVE MG/DL
LEUKOCYTE ESTERASE UR QL STRIP: NEGATIVE
MCH RBC QN AUTO: 26.9 PG (ref 26–35)
MCHC RBC AUTO-ENTMCNC: 30.4 G/DL (ref 32–34.5)
MCV RBC AUTO: 88.5 FL (ref 80–99.9)
NITRITE UR QL STRIP: NEGATIVE
PH UR STRIP: 6 [PH] (ref 5–9)
PLATELET # BLD AUTO: 222 K/UL (ref 130–450)
PMV BLD AUTO: 12.2 FL (ref 7–12)
POTASSIUM SERPL-SCNC: 4.8 MMOL/L (ref 3.5–5)
POTASSIUM SERPL-SCNC: 5.4 MMOL/L (ref 3.5–5)
POTASSIUM, UR: 38.1 MMOL/L
PROT UR STRIP-MCNC: NEGATIVE MG/DL
RBC # BLD AUTO: 3.57 M/UL (ref 3.8–5.8)
RBC #/AREA URNS HPF: ABNORMAL /HPF
SODIUM SERPL-SCNC: 133 MMOL/L (ref 132–146)
SODIUM SERPL-SCNC: 133 MMOL/L (ref 132–146)
SP GR UR STRIP: 1.02 (ref 1–1.03)
UROBILINOGEN UR STRIP-ACNC: 0.2 EU/DL (ref 0–1)
WBC #/AREA URNS HPF: ABNORMAL /HPF
WBC OTHER # BLD: 13.3 K/UL (ref 4.5–11.5)

## 2024-10-22 PROCEDURE — 82436 ASSAY OF URINE CHLORIDE: CPT

## 2024-10-22 PROCEDURE — 84133 ASSAY OF URINE POTASSIUM: CPT

## 2024-10-22 PROCEDURE — 85018 HEMOGLOBIN: CPT

## 2024-10-22 PROCEDURE — 82570 ASSAY OF URINE CREATININE: CPT

## 2024-10-22 PROCEDURE — 81001 URINALYSIS AUTO W/SCOPE: CPT

## 2024-10-22 PROCEDURE — 85014 HEMATOCRIT: CPT

## 2024-10-23 ENCOUNTER — HOSPITAL ENCOUNTER (OUTPATIENT)
Age: 74
Discharge: HOME | End: 2024-10-25

## 2024-10-23 LAB
ANION GAP SERPL CALCULATED.3IONS-SCNC: 12 MMOL/L (ref 7–16)
BUN SERPL-MCNC: 27 MG/DL (ref 6–23)
CALCIUM SERPL-MCNC: 9 MG/DL (ref 8.6–10.2)
CHLORIDE SERPL-SCNC: 102 MMOL/L (ref 98–107)
CO2 SERPL-SCNC: 22 MMOL/L (ref 22–29)
CREAT SERPL-MCNC: 1.2 MG/DL (ref 0.7–1.2)
ERYTHROCYTE [DISTWIDTH] IN BLOOD BY AUTOMATED COUNT: 15.2 % (ref 11.5–15)
GFR, ESTIMATED: 65 ML/MIN/1.73M2
GLUCOSE SERPL-MCNC: 162 MG/DL (ref 74–99)
HCT VFR BLD AUTO: 29.2 % (ref 37–54)
HGB BLD-MCNC: 9 G/DL (ref 12.5–16.5)
MCH RBC QN AUTO: 27.4 PG (ref 26–35)
MCHC RBC AUTO-ENTMCNC: 30.8 G/DL (ref 32–34.5)
MCV RBC AUTO: 88.8 FL (ref 80–99.9)
PLATELET # BLD AUTO: 192 K/UL (ref 130–450)
PMV BLD AUTO: 12.1 FL (ref 7–12)
POTASSIUM SERPL-SCNC: 5 MMOL/L (ref 3.5–5)
RBC # BLD AUTO: 3.29 M/UL (ref 3.8–5.8)
SODIUM SERPL-SCNC: 136 MMOL/L (ref 132–146)
WBC OTHER # BLD: 16.3 K/UL (ref 4.5–11.5)

## 2024-10-23 PROCEDURE — 85027 COMPLETE CBC AUTOMATED: CPT

## 2024-10-23 PROCEDURE — 80048 BASIC METABOLIC PNL TOTAL CA: CPT

## 2024-10-24 ENCOUNTER — HOSPITAL ENCOUNTER (OUTPATIENT)
Age: 74
Discharge: HOME | End: 2024-10-26

## 2024-10-24 LAB
ANION GAP SERPL CALCULATED.3IONS-SCNC: 10 MMOL/L (ref 7–16)
BUN SERPL-MCNC: 24 MG/DL (ref 6–23)
CALCIUM SERPL-MCNC: 9.2 MG/DL (ref 8.6–10.2)
CHLORIDE SERPL-SCNC: 100 MMOL/L (ref 98–107)
CO2 SERPL-SCNC: 26 MMOL/L (ref 22–29)
CREAT SERPL-MCNC: 1.2 MG/DL (ref 0.7–1.2)
ERYTHROCYTE [DISTWIDTH] IN BLOOD BY AUTOMATED COUNT: 15.3 % (ref 11.5–15)
GFR, ESTIMATED: 66 ML/MIN/1.73M2
GLUCOSE SERPL-MCNC: 153 MG/DL (ref 74–99)
HCT VFR BLD AUTO: 30 % (ref 37–54)
HGB BLD-MCNC: 9.2 G/DL (ref 12.5–16.5)
MCH RBC QN AUTO: 27.1 PG (ref 26–35)
MCHC RBC AUTO-ENTMCNC: 30.7 G/DL (ref 32–34.5)
MCV RBC AUTO: 88.2 FL (ref 80–99.9)
PLATELET # BLD AUTO: 184 K/UL (ref 130–450)
PMV BLD AUTO: 11.8 FL (ref 7–12)
POTASSIUM SERPL-SCNC: 4.5 MMOL/L (ref 3.5–5)
RBC # BLD AUTO: 3.4 M/UL (ref 3.8–5.8)
SODIUM SERPL-SCNC: 136 MMOL/L (ref 132–146)
WBC OTHER # BLD: 11.9 K/UL (ref 4.5–11.5)

## 2024-10-24 PROCEDURE — 85027 COMPLETE CBC AUTOMATED: CPT

## 2024-10-24 PROCEDURE — 80048 BASIC METABOLIC PNL TOTAL CA: CPT

## 2024-10-25 ENCOUNTER — HOSPITAL ENCOUNTER (OUTPATIENT)
Age: 74
Discharge: HOME | End: 2024-10-27

## 2024-10-25 LAB
ANION GAP SERPL CALCULATED.3IONS-SCNC: 14 MMOL/L (ref 7–16)
BUN SERPL-MCNC: 24 MG/DL (ref 6–23)
CALCIUM SERPL-MCNC: 9.1 MG/DL (ref 8.6–10.2)
CHLORIDE SERPL-SCNC: 99 MMOL/L (ref 98–107)
CO2 SERPL-SCNC: 24 MMOL/L (ref 22–29)
CREAT SERPL-MCNC: 1.2 MG/DL (ref 0.7–1.2)
ERYTHROCYTE [DISTWIDTH] IN BLOOD BY AUTOMATED COUNT: 15.3 % (ref 11.5–15)
GFR, ESTIMATED: 62 ML/MIN/1.73M2
GLUCOSE SERPL-MCNC: 151 MG/DL (ref 74–99)
HCT VFR BLD AUTO: 30.2 % (ref 37–54)
HGB BLD-MCNC: 9.5 G/DL (ref 12.5–16.5)
MCH RBC QN AUTO: 27 PG (ref 26–35)
MCHC RBC AUTO-ENTMCNC: 31.5 G/DL (ref 32–34.5)
MCV RBC AUTO: 85.8 FL (ref 80–99.9)
PLATELET # BLD AUTO: 215 K/UL (ref 130–450)
PMV BLD AUTO: 12.3 FL (ref 7–12)
POTASSIUM SERPL-SCNC: 4.8 MMOL/L (ref 3.5–5)
RBC # BLD AUTO: 3.52 M/UL (ref 3.8–5.8)
SODIUM SERPL-SCNC: 137 MMOL/L (ref 132–146)
WBC OTHER # BLD: 12.3 K/UL (ref 4.5–11.5)

## 2024-10-25 PROCEDURE — 80048 BASIC METABOLIC PNL TOTAL CA: CPT

## 2024-10-25 PROCEDURE — 85027 COMPLETE CBC AUTOMATED: CPT

## 2024-11-06 ENCOUNTER — APPOINTMENT (OUTPATIENT)
Dept: CT IMAGING | Age: 74
End: 2024-11-06
Payer: MEDICARE

## 2024-11-06 ENCOUNTER — APPOINTMENT (OUTPATIENT)
Dept: ULTRASOUND IMAGING | Age: 74
End: 2024-11-06
Payer: MEDICARE

## 2024-11-06 ENCOUNTER — HOSPITAL ENCOUNTER (INPATIENT)
Age: 74
LOS: 1 days | Discharge: HOME HEALTH | End: 2024-11-08
Attending: EMERGENCY MEDICINE | Admitting: INTERNAL MEDICINE
Payer: MEDICARE

## 2024-11-06 DIAGNOSIS — R52 PAIN: Primary | ICD-10-CM

## 2024-11-06 DIAGNOSIS — M54.50 ACUTE MIDLINE LOW BACK PAIN WITHOUT SCIATICA: ICD-10-CM

## 2024-11-06 DIAGNOSIS — R06.02 SHORTNESS OF BREATH: ICD-10-CM

## 2024-11-06 DIAGNOSIS — R07.9 CHEST PAIN, UNSPECIFIED TYPE: ICD-10-CM

## 2024-11-06 LAB
ALBUMIN SERPL-MCNC: 3.7 G/DL (ref 3.5–5.2)
ALP SERPL-CCNC: 130 U/L (ref 40–129)
ALT SERPL-CCNC: 36 U/L (ref 0–40)
ANION GAP SERPL CALCULATED.3IONS-SCNC: 16 MMOL/L (ref 7–16)
AST SERPL-CCNC: 65 U/L (ref 0–39)
BASOPHILS # BLD: 0.07 K/UL (ref 0–0.2)
BASOPHILS NFR BLD: 1 % (ref 0–2)
BILIRUB SERPL-MCNC: 0.4 MG/DL (ref 0–1.2)
BUN SERPL-MCNC: 35 MG/DL (ref 6–23)
CALCIUM SERPL-MCNC: 9.5 MG/DL (ref 8.6–10.2)
CHLORIDE SERPL-SCNC: 97 MMOL/L (ref 98–107)
CO2 SERPL-SCNC: 21 MMOL/L (ref 22–29)
CREAT SERPL-MCNC: 1.3 MG/DL (ref 0.7–1.2)
EOSINOPHIL # BLD: 0.16 K/UL (ref 0.05–0.5)
EOSINOPHILS RELATIVE PERCENT: 2 % (ref 0–6)
ERYTHROCYTE [DISTWIDTH] IN BLOOD BY AUTOMATED COUNT: 14.3 % (ref 11.5–15)
GFR, ESTIMATED: 57 ML/MIN/1.73M2
GLUCOSE SERPL-MCNC: 209 MG/DL (ref 74–99)
HCT VFR BLD AUTO: 28.6 % (ref 37–54)
HGB BLD-MCNC: 8.9 G/DL (ref 12.5–16.5)
IMM GRANULOCYTES # BLD AUTO: 0.08 K/UL (ref 0–0.58)
IMM GRANULOCYTES NFR BLD: 1 % (ref 0–5)
LYMPHOCYTES NFR BLD: 1.14 K/UL (ref 1.5–4)
LYMPHOCYTES RELATIVE PERCENT: 12 % (ref 20–42)
MCH RBC QN AUTO: 26.4 PG (ref 26–35)
MCHC RBC AUTO-ENTMCNC: 31.1 G/DL (ref 32–34.5)
MCV RBC AUTO: 84.9 FL (ref 80–99.9)
MONOCYTES NFR BLD: 1.07 K/UL (ref 0.1–0.95)
MONOCYTES NFR BLD: 12 % (ref 2–12)
NEUTROPHILS NFR BLD: 73 % (ref 43–80)
NEUTS SEG NFR BLD: 6.73 K/UL (ref 1.8–7.3)
PLATELET # BLD AUTO: 329 K/UL (ref 130–450)
PMV BLD AUTO: 10.5 FL (ref 7–12)
POTASSIUM SERPL-SCNC: 4.5 MMOL/L (ref 3.5–5)
PROT SERPL-MCNC: 6.7 G/DL (ref 6.4–8.3)
RBC # BLD AUTO: 3.37 M/UL (ref 3.8–5.8)
SODIUM SERPL-SCNC: 134 MMOL/L (ref 132–146)
TROPONIN I SERPL HS-MCNC: 38 NG/L (ref 0–11)
TROPONIN I SERPL HS-MCNC: 38 NG/L (ref 0–11)
WBC OTHER # BLD: 9.3 K/UL (ref 4.5–11.5)

## 2024-11-06 PROCEDURE — 2580000003 HC RX 258

## 2024-11-06 PROCEDURE — 6360000002 HC RX W HCPCS: Performed by: EMERGENCY MEDICINE

## 2024-11-06 PROCEDURE — 96376 TX/PRO/DX INJ SAME DRUG ADON: CPT

## 2024-11-06 PROCEDURE — 96375 TX/PRO/DX INJ NEW DRUG ADDON: CPT

## 2024-11-06 PROCEDURE — 93971 EXTREMITY STUDY: CPT

## 2024-11-06 PROCEDURE — 84484 ASSAY OF TROPONIN QUANT: CPT

## 2024-11-06 PROCEDURE — 2580000003 HC RX 258: Performed by: EMERGENCY MEDICINE

## 2024-11-06 PROCEDURE — 71275 CT ANGIOGRAPHY CHEST: CPT

## 2024-11-06 PROCEDURE — 6360000004 HC RX CONTRAST MEDICATION: Performed by: RADIOLOGY

## 2024-11-06 PROCEDURE — 6360000002 HC RX W HCPCS

## 2024-11-06 PROCEDURE — 85025 COMPLETE CBC W/AUTO DIFF WBC: CPT

## 2024-11-06 PROCEDURE — 96361 HYDRATE IV INFUSION ADD-ON: CPT

## 2024-11-06 PROCEDURE — 96374 THER/PROPH/DIAG INJ IV PUSH: CPT

## 2024-11-06 PROCEDURE — 80053 COMPREHEN METABOLIC PANEL: CPT

## 2024-11-06 PROCEDURE — 99285 EMERGENCY DEPT VISIT HI MDM: CPT

## 2024-11-06 PROCEDURE — 93005 ELECTROCARDIOGRAM TRACING: CPT

## 2024-11-06 RX ORDER — IOPAMIDOL 755 MG/ML
75 INJECTION, SOLUTION INTRAVASCULAR
Status: COMPLETED | OUTPATIENT
Start: 2024-11-06 | End: 2024-11-06

## 2024-11-06 RX ORDER — FENTANYL CITRATE 50 UG/ML
50 INJECTION, SOLUTION INTRAMUSCULAR; INTRAVENOUS ONCE
Status: COMPLETED | OUTPATIENT
Start: 2024-11-06 | End: 2024-11-06

## 2024-11-06 RX ORDER — 0.9 % SODIUM CHLORIDE 0.9 %
500 INTRAVENOUS SOLUTION INTRAVENOUS ONCE
Status: COMPLETED | OUTPATIENT
Start: 2024-11-06 | End: 2024-11-06

## 2024-11-06 RX ORDER — DEXAMETHASONE SODIUM PHOSPHATE 4 MG/ML
4 INJECTION, SOLUTION INTRA-ARTICULAR; INTRALESIONAL; INTRAMUSCULAR; INTRAVENOUS; SOFT TISSUE EVERY 6 HOURS
Status: DISCONTINUED | OUTPATIENT
Start: 2024-11-07 | End: 2024-11-08

## 2024-11-06 RX ADMIN — SODIUM CHLORIDE 500 ML: 9 INJECTION, SOLUTION INTRAVENOUS at 17:26

## 2024-11-06 RX ADMIN — DEXAMETHASONE SODIUM PHOSPHATE 12 MG: 4 INJECTION, SOLUTION INTRAMUSCULAR; INTRAVENOUS at 23:30

## 2024-11-06 RX ADMIN — FENTANYL CITRATE 50 MCG: 50 INJECTION, SOLUTION INTRAMUSCULAR; INTRAVENOUS at 17:20

## 2024-11-06 RX ADMIN — IOPAMIDOL 75 ML: 755 INJECTION, SOLUTION INTRAVENOUS at 19:22

## 2024-11-06 RX ADMIN — FENTANYL CITRATE 50 MCG: 50 INJECTION INTRAMUSCULAR; INTRAVENOUS at 21:06

## 2024-11-06 ASSESSMENT — PAIN - FUNCTIONAL ASSESSMENT: PAIN_FUNCTIONAL_ASSESSMENT: 0-10

## 2024-11-06 ASSESSMENT — PAIN DESCRIPTION - DESCRIPTORS: DESCRIPTORS: ACHING

## 2024-11-06 ASSESSMENT — PAIN DESCRIPTION - ORIENTATION: ORIENTATION: LOWER

## 2024-11-06 ASSESSMENT — PAIN DESCRIPTION - LOCATION: LOCATION: BACK

## 2024-11-06 ASSESSMENT — PAIN SCALES - GENERAL: PAINLEVEL_OUTOF10: 10

## 2024-11-06 NOTE — ED PROVIDER NOTES
[facility] EMERGENCY DEPARTMENT  EMERGENCY DEPARTMENT ENCOUNTER        Pt Name: Javier Justin Sr.  MRN: [id number]  Birthdate 1950  Date of evaluation: 11/6/2024  Provider: Juan Alberto Harris DO  PCP: Raúl Farias MD  Note Started: 4:30 PM EST 11/6/24    CHIEF COMPLAINT       Chief Complaint   Patient presents with    Back Pain     Patient had back surgery on 10/21 on L5/S1. Patient is in severe pain and has decreased ability to use lower limbs. Sent by Dr. Lugo, concern for hematoma.        HISTORY OF PRESENT ILLNESS: 1 or more Elements   History From: Patient    Limitations to history : None    Javier Justin Sr. is a 74 y.o. male with a past medical history of chronic low back pain with right-sided sciatica, diabetes, HTN, hypercholesterolemia, depression, obesity, anxiety, back surgery on 10/21 on L5/S1 with Dr. Lugo who presents to the ED with severe back pain, chest pain, shortness of breath, increased right leg swelling, and right-sided leg weakness for the past 3 days.  Patient says that he had back surgery on 10/21 and was discharged on 10/26.  Patient states he was sent in by Dr. Lugo for the symptoms.  He states he was seen by Dr. Lugo for concern for hematoma.  He says the only comfortable position is lying on his right side.  States that he is not on blood thinners.  He denies any recent travel or changes in medication.  He states that he has normal bowel and bladder function.  Patient states that he is on metformin for diabetes.  Patient also mentions that he has been diaphoretic during this time.  He denies any other known inciting factors.  He denies known fever, chills, headache, abdominal pain, nausea, vomiting, diarrhea, lightheadedness, dysuria, hematuria, hematochezia, and melena.    Nursing Notes were all reviewed and agreed with or any disagreements were addressed in the HPI.      REVIEW OF SYSTEMS :           Positives and Pertinent  discuss the interpretation with the resident.  I do agree with the residents interpretation.       I have reviewed my findings and recommendations with the assigned Medical Resident, Javier Justin Sr. and members of family present at the time of disposition.    My findings/plan: The primary encounter diagnosis was Pain. Diagnoses of Acute midline low back pain without sciatica, Chest pain, unspecified type, and Shortness of breath were also pertinent to this visit.  Current Discharge Medication List        DO Jose Garcia Megan A, DO  11/07/24 2040

## 2024-11-06 NOTE — ED TRIAGE NOTES
FIRST PROVIDER CONTACT ASSESSMENT NOTE       Department of Emergency Medicine                 First Provider Note            24  3:53 PM EST    Date of Encounter: No admission date for patient encounter.    Patient Name: Javier Justin Sr.  : 1950  MRN: [id number]    Chief Complaint: Back Pain (Patient had back surgery on 10/21 on L5/S1. Patient is in severe pain and has decreased ability to use lower limbs. Sent by Dr. Lugo, concern for hematoma. )      History of Present Illness:   Javier Justin Sr. is a 74 y.o. male who presents to the ED for Patient had surgery on lower back on 10/22 by Dr. Lugo.  Dr. Lugo is concerned about a hematoma.  Weakness to LE.  Patient is having SOB as well.      Focused Physical Exam:  VS:    ED Triage Vitals   BP Systolic BP Percentile Diastolic BP Percentile Temp Temp src Pulse Resp SpO2   -- -- -- -- -- -- -- --      Height Weight         -- --              Physical Ex: Constitutional: Alert and non-toxic.    Medical History:  has a past medical history of Anxiety, Back pain, Diabetes mellitus (HCC), Hyperlipidemia, Hypertension, and Sciatica.  Surgical History:  has a past surgical history that includes back surgery; knee surgery; other surgical history (2014); Carpal tunnel release; and hip surgery (Right).  Social History:  reports that he quit smoking about 10 years ago. His smoking use included cigarettes. He started smoking about 30 years ago. He has a 20 pack-year smoking history. He has never used smokeless tobacco. He reports that he does not drink alcohol and does not use drugs.  Family History: family history is not on file.    Allergies: Patient has no known allergies.     Initial Plan of Care: Initiate Treatment-Testing, Proceed toTreatment Area When Bed Available for ED Attending/MLP to Continue Care      ---END OF FIRST PROVIDER CONTACT ASSESSMENT NOTE---  Electronically signed by JUWAN Suarez   DD: 24

## 2024-11-07 ENCOUNTER — APPOINTMENT (OUTPATIENT)
Dept: GENERAL RADIOLOGY | Age: 74
End: 2024-11-07
Payer: MEDICARE

## 2024-11-07 ENCOUNTER — ANESTHESIA (OUTPATIENT)
Dept: OPERATING ROOM | Age: 74
End: 2024-11-07
Payer: MEDICARE

## 2024-11-07 ENCOUNTER — APPOINTMENT (OUTPATIENT)
Dept: CT IMAGING | Age: 74
End: 2024-11-07
Payer: MEDICARE

## 2024-11-07 ENCOUNTER — ANESTHESIA EVENT (OUTPATIENT)
Dept: OPERATING ROOM | Age: 74
End: 2024-11-07
Payer: MEDICARE

## 2024-11-07 PROBLEM — M54.50 LOW BACK PAIN: Status: ACTIVE | Noted: 2024-11-07

## 2024-11-07 PROBLEM — I82.431 ACUTE DEEP VEIN THROMBOSIS (DVT) OF POPLITEAL VEIN OF RIGHT LOWER EXTREMITY (HCC): Status: ACTIVE | Noted: 2024-11-07

## 2024-11-07 LAB
BASOPHILS # BLD: 0.02 K/UL (ref 0–0.2)
BASOPHILS NFR BLD: 0 % (ref 0–2)
EKG ATRIAL RATE: 89 BPM
EKG P AXIS: 24 DEGREES
EKG P-R INTERVAL: 160 MS
EKG Q-T INTERVAL: 358 MS
EKG QRS DURATION: 90 MS
EKG QTC CALCULATION (BAZETT): 435 MS
EKG R AXIS: -19 DEGREES
EKG T AXIS: 24 DEGREES
EKG VENTRICULAR RATE: 89 BPM
EOSINOPHIL # BLD: 0 K/UL (ref 0.05–0.5)
EOSINOPHILS RELATIVE PERCENT: 0 % (ref 0–6)
ERYTHROCYTE [DISTWIDTH] IN BLOOD BY AUTOMATED COUNT: 14.2 % (ref 11.5–15)
GLUCOSE BLD-MCNC: 332 MG/DL (ref 74–99)
GLUCOSE BLD-MCNC: 357 MG/DL (ref 74–99)
GLUCOSE BLD-MCNC: 360 MG/DL (ref 74–99)
GLUCOSE BLD-MCNC: 388 MG/DL (ref 74–99)
GLUCOSE BLD-MCNC: >500 MG/DL (ref 74–99)
GLUCOSE BLD-MCNC: >500 MG/DL (ref 74–99)
GLUCOSE SERPL-MCNC: 540 MG/DL (ref 74–99)
HCT VFR BLD AUTO: 28.5 % (ref 37–54)
HGB BLD-MCNC: 8.8 G/DL (ref 12.5–16.5)
IMM GRANULOCYTES # BLD AUTO: 0.07 K/UL (ref 0–0.58)
IMM GRANULOCYTES NFR BLD: 1 % (ref 0–5)
LYMPHOCYTES NFR BLD: 0.78 K/UL (ref 1.5–4)
LYMPHOCYTES RELATIVE PERCENT: 10 % (ref 20–42)
MCH RBC QN AUTO: 26.3 PG (ref 26–35)
MCHC RBC AUTO-ENTMCNC: 30.9 G/DL (ref 32–34.5)
MCV RBC AUTO: 85.3 FL (ref 80–99.9)
MONOCYTES NFR BLD: 0.11 K/UL (ref 0.1–0.95)
MONOCYTES NFR BLD: 1 % (ref 2–12)
NEUTROPHILS NFR BLD: 87 % (ref 43–80)
NEUTS SEG NFR BLD: 6.68 K/UL (ref 1.8–7.3)
PLATELET # BLD AUTO: 350 K/UL (ref 130–450)
PMV BLD AUTO: 11.2 FL (ref 7–12)
RBC # BLD AUTO: 3.34 M/UL (ref 3.8–5.8)
WBC OTHER # BLD: 7.7 K/UL (ref 4.5–11.5)

## 2024-11-07 PROCEDURE — 37191 INS ENDOVAS VENA CAVA FILTR: CPT | Performed by: SURGERY

## 2024-11-07 PROCEDURE — 3600000012 HC SURGERY LEVEL 2 ADDTL 15MIN: Performed by: SURGERY

## 2024-11-07 PROCEDURE — 6360000002 HC RX W HCPCS: Performed by: INTERNAL MEDICINE

## 2024-11-07 PROCEDURE — 82947 ASSAY GLUCOSE BLOOD QUANT: CPT

## 2024-11-07 PROCEDURE — 6360000002 HC RX W HCPCS: Performed by: SURGERY

## 2024-11-07 PROCEDURE — 6360000002 HC RX W HCPCS

## 2024-11-07 PROCEDURE — 82962 GLUCOSE BLOOD TEST: CPT

## 2024-11-07 PROCEDURE — 93010 ELECTROCARDIOGRAM REPORT: CPT | Performed by: INTERNAL MEDICINE

## 2024-11-07 PROCEDURE — 85025 COMPLETE CBC W/AUTO DIFF WBC: CPT

## 2024-11-07 PROCEDURE — 2060000000 HC ICU INTERMEDIATE R&B

## 2024-11-07 PROCEDURE — 2580000003 HC RX 258: Performed by: ORTHOPAEDIC SURGERY

## 2024-11-07 PROCEDURE — 7100000011 HC PHASE II RECOVERY - ADDTL 15 MIN: Performed by: SURGERY

## 2024-11-07 PROCEDURE — 6370000000 HC RX 637 (ALT 250 FOR IP): Performed by: EMERGENCY MEDICINE

## 2024-11-07 PROCEDURE — 3700000000 HC ANESTHESIA ATTENDED CARE: Performed by: SURGERY

## 2024-11-07 PROCEDURE — 6370000000 HC RX 637 (ALT 250 FOR IP): Performed by: ORTHOPAEDIC SURGERY

## 2024-11-07 PROCEDURE — A4217 STERILE WATER/SALINE, 500 ML: HCPCS | Performed by: SURGERY

## 2024-11-07 PROCEDURE — 7100000010 HC PHASE II RECOVERY - FIRST 15 MIN: Performed by: SURGERY

## 2024-11-07 PROCEDURE — 6370000000 HC RX 637 (ALT 250 FOR IP)

## 2024-11-07 PROCEDURE — C1769 GUIDE WIRE: HCPCS | Performed by: SURGERY

## 2024-11-07 PROCEDURE — 2709999900 HC NON-CHARGEABLE SUPPLY: Performed by: SURGERY

## 2024-11-07 PROCEDURE — 6370000000 HC RX 637 (ALT 250 FOR IP): Performed by: SURGERY

## 2024-11-07 PROCEDURE — 3600000002 HC SURGERY LEVEL 2 BASE: Performed by: SURGERY

## 2024-11-07 PROCEDURE — 2580000003 HC RX 258: Performed by: SURGERY

## 2024-11-07 PROCEDURE — 2580000003 HC RX 258

## 2024-11-07 PROCEDURE — 06H03DZ INSERTION OF INTRALUMINAL DEVICE INTO INFERIOR VENA CAVA, PERCUTANEOUS APPROACH: ICD-10-PCS | Performed by: SURGERY

## 2024-11-07 PROCEDURE — 3700000001 HC ADD 15 MINUTES (ANESTHESIA): Performed by: SURGERY

## 2024-11-07 PROCEDURE — C1880 VENA CAVA FILTER: HCPCS | Performed by: SURGERY

## 2024-11-07 PROCEDURE — 6370000000 HC RX 637 (ALT 250 FOR IP): Performed by: NURSE PRACTITIONER

## 2024-11-07 PROCEDURE — 99223 1ST HOSP IP/OBS HIGH 75: CPT | Performed by: SURGERY

## 2024-11-07 PROCEDURE — 2500000003 HC RX 250 WO HCPCS: Performed by: SURGERY

## 2024-11-07 PROCEDURE — 96372 THER/PROPH/DIAG INJ SC/IM: CPT

## 2024-11-07 PROCEDURE — 72131 CT LUMBAR SPINE W/O DYE: CPT

## 2024-11-07 DEVICE — FILTER VASC L50MM DIA30MM INTRO 7FR L65CM VENA CAVA FEM W: Type: IMPLANTABLE DEVICE | Site: GROIN | Status: FUNCTIONAL

## 2024-11-07 RX ORDER — SODIUM CHLORIDE 0.9 % (FLUSH) 0.9 %
10 SYRINGE (ML) INJECTION PRN
Status: DISCONTINUED | OUTPATIENT
Start: 2024-11-07 | End: 2024-11-08 | Stop reason: HOSPADM

## 2024-11-07 RX ORDER — IPRATROPIUM BROMIDE AND ALBUTEROL SULFATE 2.5; .5 MG/3ML; MG/3ML
1 SOLUTION RESPIRATORY (INHALATION)
Status: DISCONTINUED | OUTPATIENT
Start: 2024-11-07 | End: 2024-11-07 | Stop reason: HOSPADM

## 2024-11-07 RX ORDER — ACETAMINOPHEN 325 MG/1
650 TABLET ORAL EVERY 6 HOURS PRN
Status: DISCONTINUED | OUTPATIENT
Start: 2024-11-07 | End: 2024-11-08 | Stop reason: HOSPADM

## 2024-11-07 RX ORDER — PREGABALIN 75 MG/1
150 CAPSULE ORAL 2 TIMES DAILY
Status: DISCONTINUED | OUTPATIENT
Start: 2024-11-07 | End: 2024-11-08 | Stop reason: HOSPADM

## 2024-11-07 RX ORDER — ALPRAZOLAM 0.25 MG/1
0.5 TABLET ORAL NIGHTLY PRN
Status: DISCONTINUED | OUTPATIENT
Start: 2024-11-07 | End: 2024-11-08 | Stop reason: HOSPADM

## 2024-11-07 RX ORDER — NALOXONE HYDROCHLORIDE 0.4 MG/ML
INJECTION, SOLUTION INTRAMUSCULAR; INTRAVENOUS; SUBCUTANEOUS PRN
Status: DISCONTINUED | OUTPATIENT
Start: 2024-11-07 | End: 2024-11-07 | Stop reason: HOSPADM

## 2024-11-07 RX ORDER — GLUCAGON 1 MG/ML
1 KIT INJECTION PRN
Status: DISCONTINUED | OUTPATIENT
Start: 2024-11-07 | End: 2024-11-07 | Stop reason: HOSPADM

## 2024-11-07 RX ORDER — DEXTROSE MONOHYDRATE 100 MG/ML
INJECTION, SOLUTION INTRAVENOUS CONTINUOUS PRN
Status: DISCONTINUED | OUTPATIENT
Start: 2024-11-07 | End: 2024-11-07 | Stop reason: HOSPADM

## 2024-11-07 RX ORDER — LANOLIN ALCOHOL/MO/W.PET/CERES
400 CREAM (GRAM) TOPICAL DAILY
Status: DISCONTINUED | OUTPATIENT
Start: 2024-11-07 | End: 2024-11-08 | Stop reason: HOSPADM

## 2024-11-07 RX ORDER — SODIUM CHLORIDE 0.9 % (FLUSH) 0.9 %
10 SYRINGE (ML) INJECTION EVERY 12 HOURS SCHEDULED
Status: DISCONTINUED | OUTPATIENT
Start: 2024-11-07 | End: 2024-11-08 | Stop reason: HOSPADM

## 2024-11-07 RX ORDER — ONDANSETRON 2 MG/ML
4 INJECTION INTRAMUSCULAR; INTRAVENOUS
Status: DISCONTINUED | OUTPATIENT
Start: 2024-11-07 | End: 2024-11-07 | Stop reason: HOSPADM

## 2024-11-07 RX ORDER — SODIUM CHLORIDE 0.9 % (FLUSH) 0.9 %
5-40 SYRINGE (ML) INJECTION PRN
Status: DISCONTINUED | OUTPATIENT
Start: 2024-11-07 | End: 2024-11-07 | Stop reason: HOSPADM

## 2024-11-07 RX ORDER — FENTANYL CITRATE 50 UG/ML
INJECTION, SOLUTION INTRAMUSCULAR; INTRAVENOUS
Status: DISCONTINUED | OUTPATIENT
Start: 2024-11-07 | End: 2024-11-07 | Stop reason: SDUPTHER

## 2024-11-07 RX ORDER — HYDROCODONE BITARTRATE AND ACETAMINOPHEN 10; 325 MG/1; MG/1
1 TABLET ORAL EVERY 4 HOURS PRN
Status: DISCONTINUED | OUTPATIENT
Start: 2024-11-07 | End: 2024-11-08 | Stop reason: HOSPADM

## 2024-11-07 RX ORDER — ACETAMINOPHEN 650 MG/1
650 SUPPOSITORY RECTAL EVERY 6 HOURS PRN
Status: DISCONTINUED | OUTPATIENT
Start: 2024-11-07 | End: 2024-11-08 | Stop reason: HOSPADM

## 2024-11-07 RX ORDER — SODIUM CHLORIDE 9 MG/ML
INJECTION, SOLUTION INTRAVENOUS PRN
Status: DISCONTINUED | OUTPATIENT
Start: 2024-11-07 | End: 2024-11-07 | Stop reason: HOSPADM

## 2024-11-07 RX ORDER — DIPHENHYDRAMINE HYDROCHLORIDE 50 MG/ML
12.5 INJECTION INTRAMUSCULAR; INTRAVENOUS
Status: DISCONTINUED | OUTPATIENT
Start: 2024-11-07 | End: 2024-11-07 | Stop reason: HOSPADM

## 2024-11-07 RX ORDER — DEXTROSE MONOHYDRATE 100 MG/ML
INJECTION, SOLUTION INTRAVENOUS CONTINUOUS PRN
Status: DISCONTINUED | OUTPATIENT
Start: 2024-11-07 | End: 2024-11-08 | Stop reason: HOSPADM

## 2024-11-07 RX ORDER — CLONIDINE HYDROCHLORIDE 0.1 MG/1
0.1 TABLET ORAL 3 TIMES DAILY
Status: DISCONTINUED | OUTPATIENT
Start: 2024-11-07 | End: 2024-11-08 | Stop reason: HOSPADM

## 2024-11-07 RX ORDER — GLUCAGON 1 MG/ML
1 KIT INJECTION PRN
Status: DISCONTINUED | OUTPATIENT
Start: 2024-11-07 | End: 2024-11-08 | Stop reason: HOSPADM

## 2024-11-07 RX ORDER — PHENYLEPHRINE HCL IN 0.9% NACL 1 MG/10 ML
SYRINGE (ML) INTRAVENOUS
Status: DISCONTINUED | OUTPATIENT
Start: 2024-11-07 | End: 2024-11-07 | Stop reason: SDUPTHER

## 2024-11-07 RX ORDER — SODIUM CHLORIDE 9 MG/ML
INJECTION, SOLUTION INTRAVENOUS PRN
Status: DISCONTINUED | OUTPATIENT
Start: 2024-11-07 | End: 2024-11-08 | Stop reason: HOSPADM

## 2024-11-07 RX ORDER — HYDROCODONE BITARTRATE AND ACETAMINOPHEN 5; 325 MG/1; MG/1
2 TABLET ORAL ONCE
Status: COMPLETED | OUTPATIENT
Start: 2024-11-07 | End: 2024-11-07

## 2024-11-07 RX ORDER — INSULIN LISPRO 100 [IU]/ML
0-16 INJECTION, SOLUTION INTRAVENOUS; SUBCUTANEOUS
Status: DISCONTINUED | OUTPATIENT
Start: 2024-11-07 | End: 2024-11-08

## 2024-11-07 RX ORDER — POTASSIUM CHLORIDE 7.45 MG/ML
10 INJECTION INTRAVENOUS PRN
Status: DISCONTINUED | OUTPATIENT
Start: 2024-11-07 | End: 2024-11-07 | Stop reason: RX

## 2024-11-07 RX ORDER — ONDANSETRON 4 MG/1
4 TABLET, ORALLY DISINTEGRATING ORAL EVERY 8 HOURS PRN
Status: DISCONTINUED | OUTPATIENT
Start: 2024-11-07 | End: 2024-11-08 | Stop reason: HOSPADM

## 2024-11-07 RX ORDER — HYDROCODONE BITARTRATE AND ACETAMINOPHEN 10; 325 MG/1; MG/1
1 TABLET ORAL EVERY 8 HOURS PRN
Status: DISCONTINUED | OUTPATIENT
Start: 2024-11-07 | End: 2024-11-07

## 2024-11-07 RX ORDER — SENNOSIDES A AND B 8.6 MG/1
1 TABLET, FILM COATED ORAL DAILY PRN
Status: DISCONTINUED | OUTPATIENT
Start: 2024-11-07 | End: 2024-11-08 | Stop reason: HOSPADM

## 2024-11-07 RX ORDER — HEPARIN 100 UNIT/ML
SYRINGE INTRAVENOUS PRN
Status: DISCONTINUED | OUTPATIENT
Start: 2024-11-07 | End: 2024-11-07 | Stop reason: ALTCHOICE

## 2024-11-07 RX ORDER — MORPHINE SULFATE 15 MG/1
15 TABLET, FILM COATED, EXTENDED RELEASE ORAL 2 TIMES DAILY
Status: DISCONTINUED | OUTPATIENT
Start: 2024-11-07 | End: 2024-11-08 | Stop reason: HOSPADM

## 2024-11-07 RX ORDER — FENTANYL CITRATE 50 UG/ML
25 INJECTION, SOLUTION INTRAMUSCULAR; INTRAVENOUS EVERY 5 MIN PRN
Status: DISCONTINUED | OUTPATIENT
Start: 2024-11-07 | End: 2024-11-07 | Stop reason: HOSPADM

## 2024-11-07 RX ORDER — LANOLIN ALCOHOL/MO/W.PET/CERES
3 CREAM (GRAM) TOPICAL NIGHTLY PRN
Status: DISCONTINUED | OUTPATIENT
Start: 2024-11-07 | End: 2024-11-08 | Stop reason: HOSPADM

## 2024-11-07 RX ORDER — ONDANSETRON 2 MG/ML
4 INJECTION INTRAMUSCULAR; INTRAVENOUS EVERY 6 HOURS PRN
Status: DISCONTINUED | OUTPATIENT
Start: 2024-11-07 | End: 2024-11-08 | Stop reason: HOSPADM

## 2024-11-07 RX ORDER — INSULIN LISPRO 100 [IU]/ML
0-8 INJECTION, SOLUTION INTRAVENOUS; SUBCUTANEOUS
Status: DISCONTINUED | OUTPATIENT
Start: 2024-11-07 | End: 2024-11-07

## 2024-11-07 RX ORDER — PROPOFOL 10 MG/ML
INJECTION, EMULSION INTRAVENOUS
Status: DISCONTINUED | OUTPATIENT
Start: 2024-11-07 | End: 2024-11-07 | Stop reason: SDUPTHER

## 2024-11-07 RX ORDER — SODIUM CHLORIDE 9 MG/ML
INJECTION, SOLUTION INTRAVENOUS CONTINUOUS
Status: DISCONTINUED | OUTPATIENT
Start: 2024-11-07 | End: 2024-11-08 | Stop reason: HOSPADM

## 2024-11-07 RX ORDER — LOSARTAN POTASSIUM 50 MG/1
100 TABLET ORAL DAILY
Status: DISCONTINUED | OUTPATIENT
Start: 2024-11-07 | End: 2024-11-08 | Stop reason: HOSPADM

## 2024-11-07 RX ORDER — POTASSIUM CHLORIDE 1500 MG/1
40 TABLET, EXTENDED RELEASE ORAL PRN
Status: DISCONTINUED | OUTPATIENT
Start: 2024-11-07 | End: 2024-11-08 | Stop reason: HOSPADM

## 2024-11-07 RX ORDER — SODIUM CHLORIDE 0.9 % (FLUSH) 0.9 %
5-40 SYRINGE (ML) INJECTION EVERY 12 HOURS SCHEDULED
Status: DISCONTINUED | OUTPATIENT
Start: 2024-11-07 | End: 2024-11-07 | Stop reason: HOSPADM

## 2024-11-07 RX ORDER — PANTOPRAZOLE SODIUM 40 MG/10ML
40 INJECTION, POWDER, LYOPHILIZED, FOR SOLUTION INTRAVENOUS 2 TIMES DAILY
Status: DISCONTINUED | OUTPATIENT
Start: 2024-11-07 | End: 2024-11-08 | Stop reason: HOSPADM

## 2024-11-07 RX ORDER — ROPINIROLE 0.25 MG/1
0.25 TABLET, FILM COATED ORAL NIGHTLY
Status: DISCONTINUED | OUTPATIENT
Start: 2024-11-07 | End: 2024-11-08 | Stop reason: HOSPADM

## 2024-11-07 RX ORDER — PROCHLORPERAZINE EDISYLATE 5 MG/ML
5 INJECTION INTRAMUSCULAR; INTRAVENOUS
Status: DISCONTINUED | OUTPATIENT
Start: 2024-11-07 | End: 2024-11-07 | Stop reason: HOSPADM

## 2024-11-07 RX ADMIN — MAGNESIUM OXIDE 400 MG (241.3 MG MAGNESIUM) TABLET 400 MG: TABLET at 09:15

## 2024-11-07 RX ADMIN — HYDROCODONE BITARTRATE AND ACETAMINOPHEN 2 TABLET: 5; 325 TABLET ORAL at 02:59

## 2024-11-07 RX ADMIN — INSULIN LISPRO 12 UNITS: 100 INJECTION, SOLUTION INTRAVENOUS; SUBCUTANEOUS at 18:21

## 2024-11-07 RX ADMIN — SODIUM CHLORIDE: 9 INJECTION, SOLUTION INTRAVENOUS at 15:01

## 2024-11-07 RX ADMIN — FENTANYL CITRATE 50 MCG: 50 INJECTION, SOLUTION INTRAMUSCULAR; INTRAVENOUS at 16:19

## 2024-11-07 RX ADMIN — SODIUM CHLORIDE: 9 INJECTION, SOLUTION INTRAVENOUS at 19:45

## 2024-11-07 RX ADMIN — SODIUM CHLORIDE: 9 INJECTION, SOLUTION INTRAVENOUS at 16:43

## 2024-11-07 RX ADMIN — SODIUM CHLORIDE, PRESERVATIVE FREE 10 ML: 5 INJECTION INTRAVENOUS at 09:17

## 2024-11-07 RX ADMIN — FLUOXETINE HYDROCHLORIDE 40 MG: 20 CAPSULE ORAL at 20:58

## 2024-11-07 RX ADMIN — CLONIDINE HYDROCHLORIDE 0.1 MG: 0.1 TABLET ORAL at 15:01

## 2024-11-07 RX ADMIN — ROPINIROLE HYDROCHLORIDE 0.25 MG: 0.25 TABLET, FILM COATED ORAL at 20:58

## 2024-11-07 RX ADMIN — PREGABALIN 150 MG: 75 CAPSULE ORAL at 20:58

## 2024-11-07 RX ADMIN — HYDROCODONE BITARTRATE AND ACETAMINOPHEN 1 TABLET: 10; 325 TABLET ORAL at 14:58

## 2024-11-07 RX ADMIN — PROPOFOL 100 MCG/KG/MIN: 10 INJECTION, EMULSION INTRAVENOUS at 16:19

## 2024-11-07 RX ADMIN — FLUOXETINE HYDROCHLORIDE 40 MG: 20 CAPSULE ORAL at 09:14

## 2024-11-07 RX ADMIN — MORPHINE SULFATE 15 MG: 15 TABLET, FILM COATED, EXTENDED RELEASE ORAL at 09:16

## 2024-11-07 RX ADMIN — INSULIN LISPRO 16 UNITS: 100 INJECTION, SOLUTION INTRAVENOUS; SUBCUTANEOUS at 20:58

## 2024-11-07 RX ADMIN — DEXAMETHASONE SODIUM PHOSPHATE 4 MG: 4 INJECTION INTRA-ARTICULAR; INTRALESIONAL; INTRAMUSCULAR; INTRAVENOUS; SOFT TISSUE at 18:21

## 2024-11-07 RX ADMIN — PROPOFOL 50 MG: 10 INJECTION, EMULSION INTRAVENOUS at 16:49

## 2024-11-07 RX ADMIN — PANTOPRAZOLE SODIUM 40 MG: 40 INJECTION, POWDER, FOR SOLUTION INTRAVENOUS at 20:58

## 2024-11-07 RX ADMIN — CLONIDINE HYDROCHLORIDE 0.1 MG: 0.1 TABLET ORAL at 09:14

## 2024-11-07 RX ADMIN — DEXAMETHASONE SODIUM PHOSPHATE 4 MG: 4 INJECTION INTRA-ARTICULAR; INTRALESIONAL; INTRAMUSCULAR; INTRAVENOUS; SOFT TISSUE at 06:33

## 2024-11-07 RX ADMIN — PANTOPRAZOLE SODIUM 40 MG: 40 INJECTION, POWDER, FOR SOLUTION INTRAVENOUS at 09:15

## 2024-11-07 RX ADMIN — DEXAMETHASONE SODIUM PHOSPHATE 4 MG: 4 INJECTION INTRA-ARTICULAR; INTRALESIONAL; INTRAMUSCULAR; INTRAVENOUS; SOFT TISSUE at 11:18

## 2024-11-07 RX ADMIN — MORPHINE SULFATE 15 MG: 15 TABLET, FILM COATED, EXTENDED RELEASE ORAL at 20:58

## 2024-11-07 RX ADMIN — HYDROMORPHONE HYDROCHLORIDE 1 MG: 1 INJECTION, SOLUTION INTRAMUSCULAR; INTRAVENOUS; SUBCUTANEOUS at 03:58

## 2024-11-07 RX ADMIN — CLONIDINE HYDROCHLORIDE 0.1 MG: 0.1 TABLET ORAL at 20:58

## 2024-11-07 RX ADMIN — FENTANYL CITRATE 50 MCG: 50 INJECTION, SOLUTION INTRAMUSCULAR; INTRAVENOUS at 16:49

## 2024-11-07 RX ADMIN — Medication 100 MCG: at 17:02

## 2024-11-07 RX ADMIN — PROPOFOL 100 MCG/KG/MIN: 10 INJECTION, EMULSION INTRAVENOUS at 16:52

## 2024-11-07 RX ADMIN — INSULIN LISPRO 8 UNITS: 100 INJECTION, SOLUTION INTRAVENOUS; SUBCUTANEOUS at 06:32

## 2024-11-07 RX ADMIN — PREGABALIN 150 MG: 75 CAPSULE ORAL at 09:14

## 2024-11-07 ASSESSMENT — PAIN SCALES - GENERAL
PAINLEVEL_OUTOF10: 7
PAINLEVEL_OUTOF10: 0

## 2024-11-07 ASSESSMENT — PAIN DESCRIPTION - ORIENTATION: ORIENTATION: MID;LOWER

## 2024-11-07 ASSESSMENT — PAIN - FUNCTIONAL ASSESSMENT: PAIN_FUNCTIONAL_ASSESSMENT: 0-10

## 2024-11-07 ASSESSMENT — PAIN DESCRIPTION - LOCATION: LOCATION: BACK

## 2024-11-07 NOTE — ANESTHESIA PRE PROCEDURE
(ZOFRAN) injection 4 mg  4 mg IntraVENous Q6H PRN Barbara Pruitt APRN - CNP        senna (SENOKOT) tablet 8.6 mg  1 tablet Oral Daily PRN Barbara Pruitt APRN - MIMI        acetaminophen (TYLENOL) tablet 650 mg  650 mg Oral Q6H PRN SelBarbara delarosa APRN - CNP        Or    acetaminophen (TYLENOL) suppository 650 mg  650 mg Rectal Q6H PRN Barbara Pruitt APRN - CNP        glucose chewable tablet 16 g  4 tablet Oral PRN Barbara Pruitt APRN - CNP        dextrose bolus 10% 125 mL  125 mL IntraVENous PRN Barbara Pruitt APRN - CNP        Or    dextrose bolus 10% 250 mL  250 mL IntraVENous PRN Barbara Pruitt APRN - CNP        glucagon injection 1 mg  1 mg SubCUTAneous PRN Barbara Pruitt APRN - CNP        dextrose 10 % infusion   IntraVENous Continuous PRN Barbara Pruitt APRN - CNP        melatonin tablet 3 mg  3 mg Oral Nightly PRN VolRas ruff E, DO        pantoprazole (PROTONIX) injection 40 mg  40 mg IntraVENous BID VolinoRas E, DO   40 mg at 11/07/24 0915    insulin lispro (HUMALOG,ADMELOG) injection vial 0-16 Units  0-16 Units SubCUTAneous 4x Daily AC & HS Brooke Villarreal APRN - CNP        HYDROcodone-acetaminophen (NORCO)  MG per tablet 1 tablet  1 tablet Oral Q4H PRN Brittney Jomar H, DO   1 tablet at 11/07/24 1458    0.9 % sodium chloride infusion   IntraVENous Continuous Jomar Lugo H, DO 75 mL/hr at 11/07/24 1555 NoRateChange at 11/07/24 1555    dexAMETHasone (DECADRON) injection 4 mg  4 mg IntraVENous Q6H Barbara Pruitt APRN - CNP   4 mg at 11/07/24 1118       Allergies:  No Known Allergies    Problem List:    Patient Active Problem List   Diagnosis Code    Low back pain with right-sided sciatica M54.41    Diabetes type 2, controlled (HCC) E11.9    Essential hypertension I10    Pure hypercholesterolemia E78.00    Depression F32.A    Chronic low back pain M54.50, G89.29    Sciatica M54.30    Acute pain of right knee M25.561    Chest pain R07.9    Class 3 severe obesity in adult E66.813, E66.01    Acute

## 2024-11-07 NOTE — ED NOTES
ED to Inpatient Handoff Report    Notified Tobias GONSALEZ that electronic handoff available and patient ready for transport to room 0513.    Safety Risks: Risk of falls    Patient in Restraints: no    Constant Observer or Patient : no    Telemetry Monitoring Ordered :No           Order to transfer to unit without monitor:N/A    Last MEWS: 1 Time completed: 0518    Deterioration Index Score:   Predictive Model Details          23 (Normal)  Factor Value    Calculated 11/7/2024 05:19 54% Age 74 years old    Deterioration Index Model 13% Potassium 4.5 mmol/L     11% Respiratory rate 18     5% Hematocrit abnormal (28.6 %)     4% Systolic 128     4% BUN abnormal (35 mg/dL)     4% Sodium 134 mmol/L     3% WBC count 9.3 k/uL     1% Pulse oximetry 98 %     1% Temperature 97.2 °F (36.2 °C)     0% Pulse 77        Vitals:    11/06/24 1553 11/06/24 2042 11/07/24 0518   BP: 126/89 133/72 128/64   Pulse: (!) 124 95 77   Resp: 24 16 18   Temp: 96.9 °F (36.1 °C)  97.2 °F (36.2 °C)   TempSrc: Temporal  Oral   SpO2: 97% 99% 98%   Weight: 122.5 kg (270 lb)     Height: 1.753 m (5' 9\")           Opportunity for questions and clarification was provided.

## 2024-11-07 NOTE — PROGRESS NOTES
4 Eyes Skin Assessment     NAME:  Javier Justin .  YOB: 1950  MEDICAL RECORD NUMBER:  [id number]    The patient is being assessed for  Admission    I agree that at least one RN has performed a thorough Head to Toe Skin Assessment on the patient. ALL assessment sites listed below have been assessed.      Areas assessed by both nurses:    Head, Face, Ears, Shoulders, Back, Chest, Arms, Elbows, Hands, Sacrum. Buttock, Coccyx, Ischium, Legs. Feet and Heels, and Under Medical Devices         Does the Patient have a Wound? No noted wound(s)       Randell Prevention initiated by RN: No  Wound Care Orders initiated by RN: No    Pressure Injury (Stage 3,4, Unstageable, DTI, NWPT, and Complex wounds) if present, place Wound referral order by RN under : No    New Ostomies, if present place, Ostomy referral order under : No     Nurse 1 eSignature: Electronically signed by Shirley Sanz RN on 11/7/24 at 7:36 AM EST    **SHARE this note so that the co-signing nurse can place an eSignature**    Nurse 2 eSignature: Electronically signed by Brittney Kothari RN on 11/7/24 at 7:39 AM EST

## 2024-11-07 NOTE — PROGRESS NOTES
Spiritual Health History and Assessment/Progress Note  [facility]    Initial Encounter (Hindu communion/didn't receive),  ,  ,      Name: Javier Justin Sr. MRN: [id number]    Age: 74 y.o.     Sex: male   Language: English   Congregational: [employer]   Low back pain     Date: 11/7/2024                           Spiritual Assessment began in [facility] 6W MED SURG        Referral/Consult From: Rounding   Encounter Overview/Reason: Initial Encounter (Hindu communion/didn't receive)  Service Provided For: Patient    Carine, Belief, Meaning:   Patient identifies as spiritual, is connected with a carine tradition or spiritual practice, and has beliefs or practices that help with coping during difficult times  Family/Friends No family/friends present      Importance and Influence:  Patient has spiritual/personal beliefs that influence decisions regarding their health  Family/Friends No family/friends present    Community:  Patient feels well-supported. Support system includes: Spouse/Partner, Children, Friends, and Extended family  Family/Friends No family/friends present    Assessment and Plan of Care:     Patient Interventions include: Facilitated expression of thoughts and feelings and Affirmed coping skills/support systems  Family/Friends Interventions include: No family/friends present    Patient Plan of Care: Spiritual Care available upon further referral  Family/Friends Plan of Care: No family/friends present    Electronically signed by Chaplain Juma on 11/7/2024 at 3:33 PM

## 2024-11-07 NOTE — OP NOTE
Operative Note      Patient: Javier Justin Sr.  YOB: 1950  MRN: [id number]    Date of Procedure: 11/7/2024    Pre-Op Diagnosis Codes:      * Deep vein thrombosis (DVT) of non-extremity vein, unspecified chronicity [I82.90]    Post-Op Diagnosis: Same       Procedure(s):  VENA CAVA FILTER INSERTION    Surgeon(s):  Allen Samson MD    Assistant:   * No surgical staff found *    Anesthesia: Monitor Anesthesia Care    Estimated Blood Loss (mL): Minimal    Complications: None    Specimens:   * No specimens in log *    Implants:  Implant Name Type Inv. Item Serial No.  Lot No. LRB No. Used Action   FILTER VASC L50MM [id number] INTRO 7FR L65CM VENA CAVA FEM W - [id number] Vascular filters FILTER VASC L50MM [id number] INTRO 7FR L65CM VENA CAVA FEM W  [employer] [facility]- [id number] Right 1 Implanted         Drains: * No LDAs found *    Findings:  Infection Present At Time Of Surgery (PATOS) (choose all levels that have infection present):  No infection present  Other Findings:     Detailed Description of Procedure:   The patient was identified and the procedure confirmed.  The groin regions were prepped and draped in the usual sterile fashion.  Lidocaine 1% mixed with marcaine 0.25% were used for local anesthesia.      The right common femoral vein was percutaneously entered and a wire was advanced into the inferior vena cava under fluoroscopic guidance.  A small incision was made around the wire.  The dilator was passed over the wire followed by the introducer, which was advanced into the inferior vena cava.  The filter was advanced through the introducer then positioned and deployed at the level of the 3rd vertebra.  The filter deployed properly.      The introducer was removed and a Monocryl suture was placed around the puncture site for hemostasis.  A sterile pressure bandage was applied to the puncture site in the operating room.  Needle, sponge, and instrument counts were reported as correct.  The

## 2024-11-07 NOTE — CONSULTS
[place] [address]                              CONSULTATION      PATIENT NAME: MARCELA TANG                : 1950  MED REC NO: [id number]                        ROOM: [facility]  ACCOUNT NO: [id number]                       ADMIT DATE: 2024  PROVIDER: Jomar Lugo DO      CONSULT DATE: 2024    REASON FOR CONSULTATION:  Status post lumbar revision fusion surgery, and intractable pain and discomfort.    HISTORY OF CHIEF COMPLAINT:  The patient is a 74-year-old male who was in my office yesterday.  He developed severe pain, intractable, was diaphoretic and was sweating in the office, was unable to get out of the bed.  At this point, we told him to go to the emergency room department to be evaluated to make sure there are no other comorbidities, also to see if we can get an MRI scan of his lumbar spine to evaluate for possible postoperative hematoma.  He had significant pain and discomfort, was seen in the emergency room department, but because he has a spinal cord stimulator in, he needs a conditional MRI scan, which they did not have the software to do this at this institution.  CAT scan was done at that time and call was made to me.  He was in the ER for over 6 hours at that point in time.  He stated today that he had significant pain and discomfort, now his pain is completely controlled.  He states he has minimal discomfort in his back.  His right lower extremity pain is significantly improved and is not severe.  He is able to move his leg without severe pain and discomfort as he had before.  He states he does not have any weakness noted overall.  Currently, he has a blood clot noted in his right lower extremity in the popliteal region.  He is also having significant hyperglycemia, it was greater than 600 earlier.  They have given him insulin at this point in time.  He is evaluated today, states he is

## 2024-11-07 NOTE — PLAN OF CARE
Problem: Chronic Conditions and Co-morbidities  Goal: Patient's chronic conditions and co-morbidity symptoms are monitored and maintained or improved  11/7/2024 1601 by Nellie Machado RN  Outcome: Progressing  11/7/2024 0740 by Shirley Sanz RN  Outcome: Progressing     Problem: Discharge Planning  Goal: Discharge to home or other facility with appropriate resources  11/7/2024 1601 by Nellie Machado RN  Outcome: Progressing  11/7/2024 0740 by Shirley Sanz RN  Outcome: Progressing     Problem: Pain  Goal: Verbalizes/displays adequate comfort level or baseline comfort level  11/7/2024 1601 by Nellie Machado RN  Outcome: Progressing  11/7/2024 0740 by Shirley Sanz RN  Outcome: Progressing     Problem: Safety - Adult  Goal: Free from fall injury  11/7/2024 1601 by Nellie Machado RN  Outcome: Progressing  11/7/2024 0740 by Shirley Sanz RN  Outcome: Progressing     Problem: ABCDS Injury Assessment  Goal: Absence of physical injury  11/7/2024 1601 by Nellie Machado RN  Outcome: Progressing  11/7/2024 0740 by Shirley Sanz RN  Outcome: Progressing

## 2024-11-07 NOTE — CONSULTS
Vascular Surgery Inpatient Consultation Note      Reason for Consultation:  VCF    HISTORY OF PRESENT ILLNESS:                The patient is a 74 y.o. male who is admitted to the hospital for treatment of back pain.  The patient recently underwent surgery and there is concern for a possible postoperative hematoma versus seroma.  He was diagnosed with DVT and is felt to be at high risk for bleeding due to his recent surgery.  Vascular surgery is consulted for evaluation and treatment.    An ultrasound of the right lower extremity identifies deep venous thrombosis in the popliteal and calf veins.    IMPRESSION: Acute right lower extremity deep venous thrombosis with recent surgery.    I agree with the indication for the vena cava filter.  I reviewed the procedure with the patient.  I discussed the risks, benefits, and alternatives of the procedure.  The patient understands and consents.  All questions were answered.        Patient Active Problem List   Diagnosis Code    Low back pain with right-sided sciatica M54.41    Diabetes type 2, controlled (HCC) E11.9    Essential hypertension I10    Pure hypercholesterolemia E78.00    Depression F32.A    Chronic low back pain M54.50, G89.29    Sciatica M54.30    Acute pain of right knee M25.561    Chest pain R07.9    Class 3 severe obesity in adult E66.813, E66.01    Acute kidney injury (HCC) N17.9    Low back pain M54.50    Acute deep vein thrombosis (DVT) of popliteal vein of right lower extremity (HCC) I82.431       Past Medical History:   Diagnosis Date    Anxiety     Back pain     Diabetes mellitus (HCC)     Hyperlipidemia     Hypertension     Sciatica         Past Surgical History:   Procedure Laterality Date    BACK SURGERY      CARPAL TUNNEL RELEASE      HIP SURGERY Right     KNEE SURGERY      OTHER SURGICAL HISTORY  5/8/2014    lumbar myelogram       Current Medications:    sodium chloride      dextrose      sodium chloride 75 mL/hr at 11/07/24 1555      ALPRAZolam,

## 2024-11-07 NOTE — ED NOTES
MRI tech called and stated that they are unable to do the MRI here. Their software is unable to scan this stimulator. The tech states that the software [place] pt there.

## 2024-11-07 NOTE — ANESTHESIA POSTPROCEDURE EVALUATION
Department of Anesthesiology  Postprocedure Note    Patient: Javier Justin Sr.  MRN: [id number]  YOB: 1950  Date of evaluation: 11/7/2024    Procedure Summary       Date: 11/07/24 Room / Location: [address]    Anesthesia Start: 1636 Anesthesia Stop: 1722    Procedure: VENA CAVA FILTER INSERTION Diagnosis:       Deep vein thrombosis (DVT) of non-extremity vein, unspecified chronicity      (Deep vein thrombosis (DVT) of non-extremity vein, unspecified chronicity [I82.90])    Surgeons: Allen Samson MD Responsible Provider: Shan Elder DO    Anesthesia Type: MAC ASA Status: 3            Anesthesia Type: MAC    Annie Phase I: Annie Score: 10    Annie Phase II:      Anesthesia Post Evaluation    Patient location during evaluation: bedside  Patient participation: complete - patient participated  Level of consciousness: awake and alert and awake  Pain score: 0  Airway patency: patent  Nausea & Vomiting: no nausea and no vomiting  Cardiovascular status: hemodynamically stable  Respiratory status: acceptable, face mask and spontaneous ventilation  Hydration status: euvolemic  Multimodal analgesia pain management approach  Pain management: adequate        No notable events documented.

## 2024-11-07 NOTE — CARE COORDINATION
Internal Medicine On-call Care Coordination Note    I was called by the ED physician because they recommended admission for this patient and we cover their PCP.  The history as I understand it after discussion with the ED physician is as follows:    The patient presented with low back pain. Recently had a back surgery.   In the ED they found swelling to RLE, US performed showing + occlusive thrombus in the right popliteal and calf veins. Unable to start blood thinners d/t concern for hematoma. ED provider spoke with Dr. Lugo; CT of the lumbar spine obtained showing a fluid pocket in the lumbar spine.     I placed admission orders.  Including:    Tele  General admission orders  Home medications reconciled; Metformin held.   SSI    Dr. Rinaldi or his coverage will see the patient this AM for H&P.    Electronically signed by SHIRA Talley CNP on 11/7/2024 at 5:14 AM

## 2024-11-07 NOTE — H&P
Internal Medicine History & Physical     Name: Javier Justin .  : 1950  Chief Complaint: Back Pain (Patient had back surgery on 10/21 on L5/S1. Patient is in severe pain and has decreased ability to use lower limbs. Sent by Dr. Lugo, concern for hematoma. )  Primary Care Physician: Raúl Farias MD  Admission date: 2024  Date of service: 2024     History of Present Illness  Javier is a 74 y.o. year old male with a past medical history of hypertension, hyperlipidemia, diabetes mellitus and chronic back pain.  He presented to the ER on  at the recommendation of his surgeon due to decreased strength and ability in the right leg.  Patient reports that he underwent L5-S1 surgery on 10/21 at [facility]. He had been doing relatively well until about 5 days ago. He reports that he noticed weakness in his right leg with difficulty moving it. He noticed the foot would drag when trying to walk and he was unable to lift it. He began having increased pain in his mid-back which would radiate down into both legs with associated numbness. He was seen by Dr. Lugo and sent to the ER for evaluation due to concerns for possible hematoma. Upon arrival to the ER he was noted to be tachycardic but otherwise hemodynamically stable. Lab work noted BUN 35, creatinine 1.3, WBC 9.3 and hemoglobin 8.9. CT lumbar spine was obtained and noted a fluid pocket in the subcutaneous tissues overlying the midline lower lumbar spine concerning for possible hematoma/seroma. US of the RLE was also obtained and found an occlusive thrombus in the right popliteal and calf veins. CTA of the chest was obtained and showed no pulmonary embolism. He was given IV steroids and admitted for further management.    Currently he is seen lying in bed asleep in no distress. He does easily awaken to voice, reports being very tired as he did not sleep well last night. He does have some pain in his back, continues to have decreased  management of his hyperglycemia    Electronically signed by Ras Rinaldi DO on 11/7/2024 at 10:40 AM    I can be reached through [employer].

## 2024-11-07 NOTE — PROGRESS NOTES
RN called Barbara Pruitt NP regarding pt's blood glucose reading \"RR HI\" on glucometer. Stat glucose ordered and 8 units of humalog given per sliding scale order. RN confirmed if it was okay to give IV decadron still. Also updated Barbara Pruitt NP of pt's BP of 95/51, no orders at this time.

## 2024-11-07 NOTE — DISCHARGE INSTRUCTIONS
Please follow-up with Dr. Lugo and family doctor.  Further imaging recommended.  Please return to the ED if your symptoms persist or worsen including worsening back pain, developing numbness and tingling, bowel/bladder incontinence.

## 2024-11-08 VITALS
OXYGEN SATURATION: 99 % | HEART RATE: 84 BPM | RESPIRATION RATE: 20 BRPM | BODY MASS INDEX: 40.26 KG/M2 | TEMPERATURE: 98.6 F | DIASTOLIC BLOOD PRESSURE: 79 MMHG | HEIGHT: 69 IN | WEIGHT: 271.83 LBS | SYSTOLIC BLOOD PRESSURE: 134 MMHG

## 2024-11-08 LAB
ALBUMIN SERPL-MCNC: 3.8 G/DL (ref 3.5–5.2)
ALP SERPL-CCNC: 132 U/L (ref 40–129)
ALT SERPL-CCNC: 30 U/L (ref 0–40)
ANION GAP SERPL CALCULATED.3IONS-SCNC: 12 MMOL/L (ref 7–16)
AST SERPL-CCNC: 31 U/L (ref 0–39)
BASOPHILS # BLD: 0.01 K/UL (ref 0–0.2)
BASOPHILS NFR BLD: 0 % (ref 0–2)
BILIRUB SERPL-MCNC: 0.5 MG/DL (ref 0–1.2)
BUN SERPL-MCNC: 28 MG/DL (ref 6–23)
CALCIUM SERPL-MCNC: 9.2 MG/DL (ref 8.6–10.2)
CHLORIDE SERPL-SCNC: 102 MMOL/L (ref 98–107)
CO2 SERPL-SCNC: 22 MMOL/L (ref 22–29)
CREAT SERPL-MCNC: 1 MG/DL (ref 0.7–1.2)
EOSINOPHIL # BLD: 0 K/UL (ref 0.05–0.5)
EOSINOPHILS RELATIVE PERCENT: 0 % (ref 0–6)
ERYTHROCYTE [DISTWIDTH] IN BLOOD BY AUTOMATED COUNT: 14.4 % (ref 11.5–15)
GFR, ESTIMATED: 81 ML/MIN/1.73M2
GLUCOSE BLD-MCNC: 256 MG/DL (ref 74–99)
GLUCOSE BLD-MCNC: 293 MG/DL (ref 74–99)
GLUCOSE BLD-MCNC: 321 MG/DL (ref 74–99)
GLUCOSE SERPL-MCNC: 302 MG/DL (ref 74–99)
HCT VFR BLD AUTO: 27.3 % (ref 37–54)
HGB BLD-MCNC: 8.6 G/DL (ref 12.5–16.5)
IMM GRANULOCYTES # BLD AUTO: 0.07 K/UL (ref 0–0.58)
IMM GRANULOCYTES NFR BLD: 1 % (ref 0–5)
LYMPHOCYTES NFR BLD: 0.8 K/UL (ref 1.5–4)
LYMPHOCYTES RELATIVE PERCENT: 7 % (ref 20–42)
MCH RBC QN AUTO: 26.4 PG (ref 26–35)
MCHC RBC AUTO-ENTMCNC: 31.5 G/DL (ref 32–34.5)
MCV RBC AUTO: 83.7 FL (ref 80–99.9)
MONOCYTES NFR BLD: 0.31 K/UL (ref 0.1–0.95)
MONOCYTES NFR BLD: 3 % (ref 2–12)
NEUTROPHILS NFR BLD: 89 % (ref 43–80)
NEUTS SEG NFR BLD: 9.57 K/UL (ref 1.8–7.3)
PLATELET # BLD AUTO: 308 K/UL (ref 130–450)
PMV BLD AUTO: 10.2 FL (ref 7–12)
POTASSIUM SERPL-SCNC: 4.5 MMOL/L (ref 3.5–5)
PROT SERPL-MCNC: 6.6 G/DL (ref 6.4–8.3)
RBC # BLD AUTO: 3.26 M/UL (ref 3.8–5.8)
SODIUM SERPL-SCNC: 136 MMOL/L (ref 132–146)
WBC OTHER # BLD: 10.8 K/UL (ref 4.5–11.5)

## 2024-11-08 PROCEDURE — 6360000002 HC RX W HCPCS: Performed by: SURGERY

## 2024-11-08 PROCEDURE — 80053 COMPREHEN METABOLIC PANEL: CPT

## 2024-11-08 PROCEDURE — 85025 COMPLETE CBC W/AUTO DIFF WBC: CPT

## 2024-11-08 PROCEDURE — 36415 COLL VENOUS BLD VENIPUNCTURE: CPT

## 2024-11-08 PROCEDURE — 6370000000 HC RX 637 (ALT 250 FOR IP): Performed by: SURGERY

## 2024-11-08 PROCEDURE — 2580000003 HC RX 258: Performed by: SURGERY

## 2024-11-08 PROCEDURE — 82962 GLUCOSE BLOOD TEST: CPT

## 2024-11-08 RX ORDER — INSULIN GLARGINE 100 [IU]/ML
26 INJECTION, SOLUTION SUBCUTANEOUS NIGHTLY
Status: DISCONTINUED | OUTPATIENT
Start: 2024-11-08 | End: 2024-11-08 | Stop reason: HOSPADM

## 2024-11-08 RX ORDER — INSULIN LISPRO 100 [IU]/ML
10 INJECTION, SOLUTION INTRAVENOUS; SUBCUTANEOUS
Status: DISCONTINUED | OUTPATIENT
Start: 2024-11-08 | End: 2024-11-08 | Stop reason: HOSPADM

## 2024-11-08 RX ORDER — INSULIN GLARGINE 100 [IU]/ML
INJECTION, SOLUTION SUBCUTANEOUS
Qty: 5 ADJUSTABLE DOSE PRE-FILLED PEN SYRINGE | Refills: 3 | Status: SHIPPED | OUTPATIENT
Start: 2024-11-08

## 2024-11-08 RX ORDER — INSULIN LISPRO 100 [IU]/ML
INJECTION, SOLUTION INTRAVENOUS; SUBCUTANEOUS
Qty: 10 ADJUSTABLE DOSE PRE-FILLED PEN SYRINGE | Refills: 4 | Status: SHIPPED | OUTPATIENT
Start: 2024-11-08

## 2024-11-08 RX ORDER — DEXAMETHASONE SODIUM PHOSPHATE 4 MG/ML
4 INJECTION, SOLUTION INTRA-ARTICULAR; INTRALESIONAL; INTRAMUSCULAR; INTRAVENOUS; SOFT TISSUE EVERY 12 HOURS
Status: DISCONTINUED | OUTPATIENT
Start: 2024-11-08 | End: 2024-11-08 | Stop reason: HOSPADM

## 2024-11-08 RX ORDER — INSULIN LISPRO 100 [IU]/ML
0-12 INJECTION, SOLUTION INTRAVENOUS; SUBCUTANEOUS
Status: DISCONTINUED | OUTPATIENT
Start: 2024-11-08 | End: 2024-11-08 | Stop reason: HOSPADM

## 2024-11-08 RX ORDER — PEN NEEDLE, DIABETIC 32 GX 1/4"
NEEDLE, DISPOSABLE MISCELLANEOUS
Qty: 250 EACH | Refills: 5 | Status: SHIPPED | OUTPATIENT
Start: 2024-11-08

## 2024-11-08 RX ORDER — PREDNISONE 10 MG/1
TABLET ORAL
Qty: 10 TABLET | Refills: 0 | Status: SHIPPED | OUTPATIENT
Start: 2024-11-08 | End: 2024-11-18

## 2024-11-08 RX ADMIN — CLONIDINE HYDROCHLORIDE 0.1 MG: 0.1 TABLET ORAL at 10:07

## 2024-11-08 RX ADMIN — DEXAMETHASONE SODIUM PHOSPHATE 4 MG: 4 INJECTION INTRA-ARTICULAR; INTRALESIONAL; INTRAMUSCULAR; INTRAVENOUS; SOFT TISSUE at 10:08

## 2024-11-08 RX ADMIN — MORPHINE SULFATE 15 MG: 15 TABLET, FILM COATED, EXTENDED RELEASE ORAL at 10:06

## 2024-11-08 RX ADMIN — INSULIN LISPRO 12 UNITS: 100 INJECTION, SOLUTION INTRAVENOUS; SUBCUTANEOUS at 11:27

## 2024-11-08 RX ADMIN — MAGNESIUM OXIDE 400 MG (241.3 MG MAGNESIUM) TABLET 400 MG: TABLET at 10:07

## 2024-11-08 RX ADMIN — PREGABALIN 150 MG: 75 CAPSULE ORAL at 10:07

## 2024-11-08 RX ADMIN — DEXAMETHASONE SODIUM PHOSPHATE 4 MG: 4 INJECTION INTRA-ARTICULAR; INTRALESIONAL; INTRAMUSCULAR; INTRAVENOUS; SOFT TISSUE at 00:22

## 2024-11-08 RX ADMIN — INSULIN LISPRO 8 UNITS: 100 INJECTION, SOLUTION INTRAVENOUS; SUBCUTANEOUS at 06:10

## 2024-11-08 RX ADMIN — PANTOPRAZOLE SODIUM 40 MG: 40 INJECTION, POWDER, FOR SOLUTION INTRAVENOUS at 10:08

## 2024-11-08 RX ADMIN — FLUOXETINE HYDROCHLORIDE 40 MG: 20 CAPSULE ORAL at 10:06

## 2024-11-08 RX ADMIN — SODIUM CHLORIDE, PRESERVATIVE FREE 10 ML: 5 INJECTION INTRAVENOUS at 10:12

## 2024-11-08 RX ADMIN — DEXAMETHASONE SODIUM PHOSPHATE 4 MG: 4 INJECTION INTRA-ARTICULAR; INTRALESIONAL; INTRAMUSCULAR; INTRAVENOUS; SOFT TISSUE at 05:36

## 2024-11-08 ASSESSMENT — PAIN SCALES - GENERAL: PAINLEVEL_OUTOF10: 5

## 2024-11-08 ASSESSMENT — PAIN DESCRIPTION - DESCRIPTORS: DESCRIPTORS: ACHING

## 2024-11-08 ASSESSMENT — PAIN DESCRIPTION - LOCATION: LOCATION: BACK

## 2024-11-08 ASSESSMENT — PAIN DESCRIPTION - ORIENTATION: ORIENTATION: MID

## 2024-11-08 NOTE — ACP (ADVANCE CARE PLANNING)
Advance Care Planning   Healthcare Decision Maker:    Primary Decision Maker: Sunita Justin - [facility] - [phone]    Click here to complete Healthcare Decision Makers including selection of the Healthcare Decision Maker Relationship (ie \"Primary\").

## 2024-11-08 NOTE — PROGRESS NOTES
Internal Medicine Progress Note    Patient's name: Javier Justin .  : 1950  Chief complaints (on day of admission): Back Pain (Patient had back surgery on 10/21 on L5/S1. Patient is in severe pain and has decreased ability to use lower limbs. Sent by Dr. Lugo, concern for hematoma. )  Admission date: 2024  Date of service: 2024   Room: [address] MED SURG  Primary care physician: Raúl Farias MD  Reason for visit: Follow-up for severe back pain and RLE weakness s/p back surgery    Subjective  Javier is seen sitting up in bed awake and alert no distress.  He is in very good spirits today, just finished eating breakfast.  He reports that he is now able to move his right leg, denies any further pain or discomfort.  He reports that he wants to get up and move around and is hoping to go home today.  He denies any nausea or vomiting.  Denies any shortness of breath or difficulty breathing.  He does report some concerns over his blood sugars but understands they are somewhat elevated because he is not taking his home medication and is currently on IV steroids.  No other issues or concerns from nursing.    Review of Systems  Full 10 point review of systems negative unless mentioned above.    Hospital Medications  Current Facility-Administered Medications   Medication Dose Route Frequency Provider Last Rate Last Admin    ALPRAZolam (XANAX) tablet 0.5 mg  0.5 mg Oral Nightly PRN Allen Samson MD        cloNIDine (CATAPRES) tablet 0.1 mg  0.1 mg Oral TID Allen Samson MD   0.1 mg at 24 1007    FLUoxetine (PROZAC) capsule 40 mg  40 mg Oral BID Allen Samson MD   40 mg at 24 1006    magnesium oxide (MAG-OX) tablet 400 mg  400 mg Oral Daily Allen Samson MD   400 mg at 24 1007    [Held by provider] metFORMIN (GLUCOPHAGE) tablet 1,000 mg  1,000 mg Oral BID  Allen Samson MD        morphine (MS CONTIN) extended release tablet 15 mg  15 mg Oral BID [place]

## 2024-11-08 NOTE — DISCHARGE SUMMARY
Internal Medicine Discharge Summary    NAME: Javier Justin Sr. :  1950  MRN:  [id number] PCP:Raúl Farias MD    ADMITTED: 2024   DISCHARGED: 2024  5:05 PM    ADMITTING PHYSICIAN: Ras Rinaldi DO    PCP: Raúl Farias MD    CONSULTANT(S):   IP CONSULT TO ORTHOPEDIC SURGERY  IP CONSULT TO INTERNAL MEDICINE  IP CONSULT TO VASCULAR ACCESS TEAM  IP CONSULT TO SOCIAL WORK  IP CONSULT TO VASCULAR SURGERY  IP CONSULT TO ENDOCRINOLOGY     ADMITTING DIAGNOSIS:   Shortness of breath [R06.02]  Low back pain [M54.50]  Pain [R52]  Acute midline low back pain without sciatica [M54.50]  Chest pain, unspecified type [R07.9]     Please see H&P for further details    DISCHARGE DIAGNOSES:   Active Hospital Problems    Diagnosis     Low back pain [M54.50]     Acute deep vein thrombosis (DVT) of popliteal vein of right lower extremity (HCC) [I82.431]     Class 3 severe obesity in adult [E66.813, E66.01]     Sciatica [M54.30]     Depression [F32.A]     Diabetes type 2, controlled (HCC) [E11.9]     Essential hypertension [I10]     Low back pain with right-sided sciatica [M54.41]     Pure hypercholesterolemia [E78.00]        BRIEF HISTORY OF PRESENT ILLNESS: Javier Justin Sr. is a 74 y.o. male patient of Raúl Farias MD who  has a past medical history of Anxiety, Back pain, Diabetes mellitus (HCC), Hyperlipidemia, Hypertension, and Sciatica. who originally had concerns including Back Pain (Patient had back surgery on 10/21 on L5/S1. Patient is in severe pain and has decreased ability to use lower limbs. Sent by Dr. Lugo, concern for hematoma. ). at presentation on 2024, and was found to have Shortness of breath [R06.02]  Low back pain [M54.50]  Pain [R52]  Acute midline low back pain without sciatica [M54.50]  Chest pain, unspecified type [R07.9] after workup.    Please see H&P for further details.    HOSPITAL COURSE:   The patient presented to the hospital with the chief complaint of  scaled imaging and Doppler spectral analysis and color flow was obtained of the deep venous structures of the right extremity. COMPARISON: None. HISTORY: ORDERING SYSTEM PROVIDED HISTORY: Edema right calf TECHNOLOGIST PROVIDED HISTORY: What reading provider will be dictating this exam?->CRC FINDINGS: There is occlusive thrombus in the right popliteal and calf veins. The remaining visualized veins of the right lower extremity are patent and free of echogenic thrombus. The veins demonstrate good compressibility with normal color flow study and spectral analysis.     Occlusive thrombus in the right popliteal and calf veins. Urgent call result to be provided to a license caregiver at 5:50 p.m..       DISPOSITION:  The patient's condition is fair.   The patient is being discharged to home    DISCHARGE MEDICATIONS:      Medication List        START taking these medications      BD Pen Needle Micro U/F 32G X 6 MM Misc  Generic drug: Insulin Pen Needle  Uses with insulin 4 times a day. (okay to change to any other brands as per insurance coverage)     insulin lispro (1 Unit Dial) 100 UNIT/ML Sopn  Commonly known as: HumaLOG KwikPen  Inject 10 units with meals plus the sliding scale. -200 add 3U, -250 add 6U, -300 add 9U, -350 add 12U, -400 add 15U, BS over 400 add 18U. MAX 50u/day     Lantus SoloStar 100 UNIT/ML injection pen  Generic drug: insulin glargine  Inject 26 units nightly     predniSONE 10 MG tablet  Commonly known as: DELTASONE  Take 4 tabs x 1 day, 3 tabs x 1 day, 2 tabs x 1 day, 1 tab x 1 day, stop.            CHANGE how you take these medications      cloNIDine 0.1 MG tablet  Commonly known as: CATAPRES  TAKE 1 TABLET TWICE A DAY  What changed:   how much to take  how to take this  when to take this            CONTINUE taking these medications      ALPRAZolam 0.5 MG tablet  Commonly known as: XANAX  Take 1 tablet by mouth nightly as needed for Sleep     blood glucose test strips

## 2024-11-08 NOTE — PROGRESS NOTES
Spiritual Health History and Assessment/Progress Note  [facility]     Encounter, Rituals, Rites and Sacraments,  ,  ,      Name: Javier Justin Sr. MRN: [id number]    Age: 74 y.o.     Sex: male   Language: English   Jewish: Hinduism   Low back pain     Date: 11/8/2024                           Spiritual Assessment began in [facility]W MED SURG        Referral/Consult From: Rounding   Encounter Overview/Reason:  Encounter, Rituals, Rites and Sacraments  Service Provided For: Patient    Carine, Belief, Meaning:   Patient is connected with a carine tradition or spiritual practice  Family/Friends No family/friends present      Importance and Influence:  Patient has no beliefs influential to healthcare decision-making identified during this visit  Family/Friends No family/friends present    Community:  Patient feels well-supported. Support system includes: Spouse/Partner  Family/Friends No family/friends present    Assessment and Plan of Care:     Patient Interventions include: Provided sacramental/Rastafari ritual  Family/Friends Interventions include: No family/friends present    Patient Plan of Care: Spiritual Care available upon further referral  Family/Friends Plan of Care: Spiritual Care available upon further referral    Electronically signed by Chaplain Jennifer on 11/8/2024 at 4:28 PM

## 2024-11-08 NOTE — CARE COORDINATION
Social work / Discharge planning:         Social work updated liaison for [facility] that patient is being discharged.   Electronically signed by DENNIS Phillips on 11/8/2024 at 2:25 PM

## 2024-11-08 NOTE — PROGRESS NOTES
Department of Orthopedic Surgery  Physician Assistant Progress Note      SUBJECTIVE  Patient feeling much better, pain under control.  Rates pain 2/10 now.  Leg pain better, numbness better.  Got IVC filter.  Denies bowel or bladder issues.      OBJECTIVE    Physical    VITALS:  BP (!) 144/77   Pulse 79   Temp 97.9 °F (36.6 °C) (Oral)   Resp 22   Ht 1.753 m (5' 9\")   Wt 123.3 kg (271 lb 13.3 oz)   SpO2 98%   BMI 40.14 kg/m²   A+Ox3, NAD  Resting in bed, appears comfortable  BLE MS 5/5, no weakness appreciated  Sens grossly intact to LT BLE  No calf ttp  Incision c/d/I  No palpable fluid collection    Data    CBC:   Lab Results   Component Value Date/Time    WBC 10.8 11/08/2024 06:43 AM    RBC 3.26 11/08/2024 06:43 AM    HGB 8.6 11/08/2024 06:43 AM    HCT 27.3 11/08/2024 06:43 AM    MCV 83.7 11/08/2024 06:43 AM    MCH 26.4 11/08/2024 06:43 AM    MCHC 31.5 11/08/2024 06:43 AM    RDW 14.4 11/08/2024 06:43 AM     11/08/2024 06:43 AM    MPV 10.2 11/08/2024 06:43 AM     BMP:    Lab Results   Component Value Date/Time     11/08/2024 06:43 AM    K 4.5 11/08/2024 06:43 AM    K 4.5 02/18/2023 05:50 PM     11/08/2024 06:43 AM    CO2 22 11/08/2024 06:43 AM    BUN 28 11/08/2024 06:43 AM    CREATININE 1.0 11/08/2024 06:43 AM    CALCIUM 9.2 11/08/2024 06:43 AM    GFRAA >60 08/04/2020 01:51 PM    LABGLOM 81 11/08/2024 06:43 AM    LABGLOM 58 04/17/2024 03:07 PM    GLUCOSE 302 11/08/2024 06:43 AM     Current Inpatient Medications    Current Facility-Administered Medications: dexAMETHasone (DECADRON) injection 4 mg, 4 mg, IntraVENous, Q12H  ALPRAZolam (XANAX) tablet 0.5 mg, 0.5 mg, Oral, Nightly PRN  cloNIDine (CATAPRES) tablet 0.1 mg, 0.1 mg, Oral, TID  FLUoxetine (PROZAC) capsule 40 mg, 40 mg, Oral, BID  magnesium oxide (MAG-OX) tablet 400 mg, 400 mg, Oral, Daily  [Held by provider] metFORMIN (GLUCOPHAGE) tablet 1,000 mg, 1,000 mg, Oral, BID WC  morphine (MS CONTIN) extended release tablet 15 mg, 15 mg,  Oral, BID  losartan (COZAAR) tablet 100 mg, 100 mg, Oral, Daily  pregabalin (LYRICA) capsule 150 mg, 150 mg, Oral, BID  rOPINIRole (REQUIP) tablet 0.25 mg, 0.25 mg, Oral, Nightly  tiZANidine (ZANAFLEX) tablet 4 mg, 4 mg, Oral, BID PRN  sodium chloride flush 0.9 % injection 10 mL, 10 mL, IntraVENous, 2 times per day  sodium chloride flush 0.9 % injection 10 mL, 10 mL, IntraVENous, PRN  0.9 % sodium chloride infusion, , IntraVENous, PRN  potassium chloride (KLOR-CON M) extended release tablet 40 mEq, 40 mEq, Oral, PRN **OR** potassium bicarb-citric acid (EFFER-K) effervescent tablet 40 mEq, 40 mEq, Oral, PRN **OR** [DISCONTINUED] potassium chloride 10 mEq/100 mL IVPB (Peripheral Line), 10 mEq, IntraVENous, PRN  ondansetron (ZOFRAN-ODT) disintegrating tablet 4 mg, 4 mg, Oral, Q8H PRN **OR** ondansetron (ZOFRAN) injection 4 mg, 4 mg, IntraVENous, Q6H PRN  senna (SENOKOT) tablet 8.6 mg, 1 tablet, Oral, Daily PRN  acetaminophen (TYLENOL) tablet 650 mg, 650 mg, Oral, Q6H PRN **OR** acetaminophen (TYLENOL) suppository 650 mg, 650 mg, Rectal, Q6H PRN  glucose chewable tablet 16 g, 4 tablet, Oral, PRN  dextrose bolus 10% 125 mL, 125 mL, IntraVENous, PRN **OR** dextrose bolus 10% 250 mL, 250 mL, IntraVENous, PRN  glucagon injection 1 mg, 1 mg, SubCUTAneous, PRN  dextrose 10 % infusion, , IntraVENous, Continuous PRN  melatonin tablet 3 mg, 3 mg, Oral, Nightly PRN  pantoprazole (PROTONIX) injection 40 mg, 40 mg, IntraVENous, BID  insulin lispro (HUMALOG,ADMELOG) injection vial 0-16 Units, 0-16 Units, SubCUTAneous, 4x Daily AC & HS  HYDROcodone-acetaminophen (NORCO)  MG per tablet 1 tablet, 1 tablet, Oral, Q4H PRN  0.9 % sodium chloride infusion, , IntraVENous, Continuous    ASSESSMENT AND PLAN      S/p extension fusion L5-S1 2 weeks ago, presented postop 2 days ago in severe pain,  Found to have DVT RLE and filter placed.  Discussed with Dr. Lugo. Joaquín for d/c home from surgery standpoint.  He got refill from pain

## 2024-11-08 NOTE — CARE COORDINATION
Social work / Discharge planning:          Patient lives with his wife and son who has special needs.   They live in a two story home but stay on the first floor.    He states that he owns a walker, cane, wc and scooter.      Patient is active with [facility] and will need RUEL order prior to discharge.   Electronically signed by DENNIS Phillips on 11/8/2024 at 12:56 PM

## 2024-11-08 NOTE — PLAN OF CARE
Problem: Chronic Conditions and Co-morbidities  Goal: Patient's chronic conditions and co-morbidity symptoms are monitored and maintained or improved  11/7/2024 2134 by Shirley Sanz RN  Outcome: Progressing  11/7/2024 1601 by Nellie Machado RN  Outcome: Progressing  11/7/2024 0740 by Shirley Sanz RN  Outcome: Progressing     Problem: Discharge Planning  Goal: Discharge to home or other facility with appropriate resources  11/7/2024 2134 by Shirley Sanz RN  Outcome: Progressing  11/7/2024 1601 by Nellie Machado RN  Outcome: Progressing  11/7/2024 0740 by Shirley Sanz RN  Outcome: Progressing     Problem: Pain  Goal: Verbalizes/displays adequate comfort level or baseline comfort level  11/7/2024 2134 by Shirley Sanz RN  Outcome: Progressing  11/7/2024 1601 by Nellie Machado RN  Outcome: Progressing  11/7/2024 0740 by Shirley Sanz RN  Outcome: Progressing     Problem: Safety - Adult  Goal: Free from fall injury  11/7/2024 2134 by Shirley Sanz RN  Outcome: Progressing  11/7/2024 1601 by Nellie Machado RN  Outcome: Progressing  11/7/2024 0740 by Shirley Sanz RN  Outcome: Progressing     Problem: ABCDS Injury Assessment  Goal: Absence of physical injury  11/7/2024 2134 by Shirley Sanz RN  Outcome: Progressing  11/7/2024 1601 by Nellie Machado RN  Outcome: Progressing  11/7/2024 0740 by Shirley Sanz RN  Outcome: Progressing

## 2024-11-11 NOTE — PROGRESS NOTES
CLINICAL PHARMACY NOTE: MEDS TO BEDS    Total # of Prescriptions Filled: 1   The following medications were delivered to the patient:  Prednisone 10 mg     Additional Documentation:

## 2024-11-14 ENCOUNTER — OFFICE VISIT (OUTPATIENT)
Dept: ENDOCRINOLOGY | Age: 74
End: 2024-11-14

## 2024-11-14 ENCOUNTER — FOLLOWUP TELEPHONE ENCOUNTER (OUTPATIENT)
Dept: ENDOCRINOLOGY | Age: 74
End: 2024-11-14

## 2024-11-14 VITALS
HEART RATE: 66 BPM | SYSTOLIC BLOOD PRESSURE: 118 MMHG | DIASTOLIC BLOOD PRESSURE: 78 MMHG | BODY MASS INDEX: 39.99 KG/M2 | OXYGEN SATURATION: 96 % | HEIGHT: 69 IN | WEIGHT: 270 LBS

## 2024-11-14 DIAGNOSIS — E11.65 TYPE 2 DIABETES MELLITUS WITH HYPERGLYCEMIA, WITH LONG-TERM CURRENT USE OF INSULIN (HCC): Primary | ICD-10-CM

## 2024-11-14 DIAGNOSIS — Z79.4 TYPE 2 DIABETES MELLITUS WITH HYPERGLYCEMIA, WITH LONG-TERM CURRENT USE OF INSULIN (HCC): Primary | ICD-10-CM

## 2024-11-14 DIAGNOSIS — Z91.119 DIETARY NONCOMPLIANCE: ICD-10-CM

## 2024-11-14 RX ORDER — ACYCLOVIR 400 MG/1
TABLET ORAL
Qty: 9 EACH | Refills: 3 | Status: SHIPPED | OUTPATIENT
Start: 2024-11-14

## 2024-11-14 RX ORDER — DOXYCYCLINE 100 MG/1
CAPSULE ORAL
COMMUNITY
Start: 2024-10-11

## 2024-11-14 RX ORDER — LIDOCAINE 50 MG/G
PATCH TOPICAL
COMMUNITY
Start: 2024-11-04

## 2024-11-14 RX ORDER — METOPROLOL SUCCINATE 25 MG/1
25 TABLET, EXTENDED RELEASE ORAL DAILY
COMMUNITY
Start: 2024-10-30

## 2024-11-14 NOTE — TELEPHONE ENCOUNTER
Met with patient in endo office for diabetes education. He has had DM x10 years, attended diabetes classes at initial dx. Did not follow diabetic diet until recently. Patient reports reduced portion sizes, more vegetables, and limiting/avoiding added sugars. He notes 10 lb weight loss and improved blood sugars. Congratulated patient on improved health and encouraged him to continue these behaviors. Discussed high fiber foods and increasing consumption. Assisted with dexcom setup and application. F/u as needed

## 2024-11-19 ENCOUNTER — APPOINTMENT (OUTPATIENT)
Dept: GENERAL RADIOLOGY | Age: 74
End: 2024-11-19
Payer: MEDICARE

## 2024-11-19 ENCOUNTER — HOSPITAL ENCOUNTER (EMERGENCY)
Age: 74
Discharge: HOME | End: 2024-11-19
Attending: EMERGENCY MEDICINE
Payer: MEDICARE

## 2024-11-19 ENCOUNTER — APPOINTMENT (OUTPATIENT)
Dept: CT IMAGING | Age: 74
End: 2024-11-19
Attending: EMERGENCY MEDICINE
Payer: MEDICARE

## 2024-11-19 VITALS
HEART RATE: 64 BPM | RESPIRATION RATE: 20 BRPM | SYSTOLIC BLOOD PRESSURE: 148 MMHG | TEMPERATURE: 98 F | DIASTOLIC BLOOD PRESSURE: 80 MMHG | OXYGEN SATURATION: 97 %
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
## 2024-11-19 LAB
ALBUMIN SERPL-MCNC: 3.8 G/DL (ref 3.5–5.2)
ALP SERPL-CCNC: 143 U/L (ref 40–129)
ALT SERPL-CCNC: 22 U/L (ref 0–40)
ANION GAP SERPL CALCULATED.3IONS-SCNC: 10 MMOL/L (ref 7–16)
AST SERPL-CCNC: 29 U/L (ref 0–39)
BASOPHILS # BLD: 0.04 K/UL (ref 0–0.2)
BASOPHILS NFR BLD: 0 % (ref 0–2)
BILIRUB SERPL-MCNC: 0.5 MG/DL (ref 0–1.2)
BUN SERPL-MCNC: 33 MG/DL (ref 6–23)
CALCIUM SERPL-MCNC: 9.7 MG/DL (ref 8.6–10.2)
CHLORIDE SERPL-SCNC: 98 MMOL/L (ref 98–107)
CO2 SERPL-SCNC: 28 MMOL/L (ref 22–29)
CREAT SERPL-MCNC: 1.4 MG/DL (ref 0.7–1.2)
EOSINOPHIL # BLD: 0.26 K/UL (ref 0.05–0.5)
EOSINOPHILS RELATIVE PERCENT: 3 % (ref 0–6)
ERYTHROCYTE [DISTWIDTH] IN BLOOD BY AUTOMATED COUNT: 14.8 % (ref 11.5–15)
GFR, ESTIMATED: 52 ML/MIN/1.73M2
GLUCOSE SERPL-MCNC: 143 MG/DL (ref 74–99)
HCT VFR BLD AUTO: 31.1 % (ref 37–54)
HGB BLD-MCNC: 9.4 G/DL (ref 12.5–16.5)
IMM GRANULOCYTES # BLD AUTO: 0.08 K/UL (ref 0–0.58)
IMM GRANULOCYTES NFR BLD: 1 % (ref 0–5)
LYMPHOCYTES NFR BLD: 1.69 K/UL (ref 1.5–4)
LYMPHOCYTES RELATIVE PERCENT: 16 % (ref 20–42)
MAGNESIUM SERPL-MCNC: 1.5 MG/DL (ref 1.6–2.6)
MCH RBC QN AUTO: 25.9 PG (ref 26–35)
MCHC RBC AUTO-ENTMCNC: 30.2 G/DL (ref 32–34.5)
MCV RBC AUTO: 85.7 FL (ref 80–99.9)
MONOCYTES NFR BLD: 1.09 K/UL (ref 0.1–0.95)
MONOCYTES NFR BLD: 11 % (ref 2–12)
NEUTROPHILS NFR BLD: 69 % (ref 43–80)
NEUTS SEG NFR BLD: 7.16 K/UL (ref 1.8–7.3)
PLATELET # BLD AUTO: 242 K/UL (ref 130–450)
PMV BLD AUTO: 11.1 FL (ref 7–12)
POTASSIUM SERPL-SCNC: 4.7 MMOL/L (ref 3.5–5)
PROT SERPL-MCNC: 7.1 G/DL (ref 6.4–8.3)
RBC # BLD AUTO: 3.63 M/UL (ref 3.8–5.8)
SODIUM SERPL-SCNC: 136 MMOL/L (ref 132–146)
TROPONIN I SERPL HS-MCNC: 38 NG/L (ref 0–11)
WBC OTHER # BLD: 10.3 K/UL (ref 4.5–11.5)

## 2024-11-19 PROCEDURE — 71045 X-RAY EXAM CHEST 1 VIEW: CPT

## 2024-11-19 PROCEDURE — 74177 CT ABD & PELVIS W/CONTRAST: CPT

## 2024-11-19 PROCEDURE — 72131 CT LUMBAR SPINE W/O DYE: CPT

## 2024-11-19 PROCEDURE — 73630 X-RAY EXAM OF FOOT: CPT

## 2024-11-19 PROCEDURE — 72128 CT CHEST SPINE W/O DYE: CPT

## 2024-11-19 PROCEDURE — 70450 CT HEAD/BRAIN W/O DYE: CPT

## 2024-11-19 PROCEDURE — 85025 COMPLETE CBC W/AUTO DIFF WBC: CPT

## 2024-11-19 PROCEDURE — 99285 EMERGENCY DEPT VISIT HI MDM: CPT

## 2024-11-19 PROCEDURE — 93005 ELECTROCARDIOGRAM TRACING: CPT | Performed by: EMERGENCY MEDICINE

## 2024-11-19 PROCEDURE — 84484 ASSAY OF TROPONIN QUANT: CPT

## 2024-11-19 PROCEDURE — 6360000004 HC RX CONTRAST MEDICATION: Performed by: RADIOLOGY

## 2024-11-19 PROCEDURE — 83735 ASSAY OF MAGNESIUM: CPT

## 2024-11-19 PROCEDURE — 80053 COMPREHEN METABOLIC PANEL: CPT

## 2024-11-19 RX ORDER — IOPAMIDOL 755 MG/ML
75 INJECTION, SOLUTION INTRAVASCULAR
Status: COMPLETED | OUTPATIENT
Start: 2024-11-19 | End: 2024-11-19

## 2024-11-19 RX ORDER — MAGNESIUM SULFATE IN WATER 40 MG/ML
2000 INJECTION, SOLUTION INTRAVENOUS ONCE
Status: DISCONTINUED | OUTPATIENT
Start: 2024-11-19 | End: 2024-11-20 | Stop reason: HOSPADM

## 2024-11-19 RX ORDER — 0.9 % SODIUM CHLORIDE 0.9 %
1000 INTRAVENOUS SOLUTION INTRAVENOUS ONCE
Status: DISCONTINUED | OUTPATIENT
Start: 2024-11-19 | End: 2024-11-20 | Stop reason: HOSPADM

## 2024-11-19 RX ADMIN — IOPAMIDOL 75 ML: 755 INJECTION, SOLUTION INTRAVENOUS at 19:50

## 2024-11-19 NOTE — ED PROVIDER NOTES
ED PROVIDER NOTE    Chief Complaint   Patient presents with    Fall     Prior spinal fusion and pain. Hit head, no thinners. Hit butt tock       HPI:  11/19/24,   Time: 6:03 PM BRANDY Justin Sr. is a 74 y.o. male presenting to the ED for fall.  Acute onset at 12 PM today.  Patient states that he has had many similar episodes in the past.  He states that he was walking to the door with his cane, had sudden onset of dizziness and fell backwards onto his buttocks.  He is not sure if he struck his head.  He is not on anticoagulants.  He laid on the ground for about 15 minutes for being able to get up on his own.  He does have prior history of spinal surgery by Dr. Lugo.  He is currently having diffuse pain throughout his mid and lower back.  No headache, neck pain, vision changes, fever, chills, cough, chest pain, shortness of breath.  He does have diffuse abdominal pain and had 1 episode of emesis after the fall.  No changes in bowel or bladder function.    Chart review: hx of HTN, HLD, sciatica, DM, anxiety    Reviewed internal medicine discharge summary from 11/10/2024 by Brooke Villarreal NP:  Patient presented with back pain after having L5/S1 surgery on 10/21/24, found to have spinal hematoma vs seroma. Also found to have acute RLE DVT s/p IVC filter    Review of Systems:     Review of Systems  Pertinent positives and negatives as stated in HPI     --------------------------------------------- PAST HISTORY ---------------------------------------------  Past Medical History:   Past Medical History:   Diagnosis Date    Anxiety     Back pain     Diabetes mellitus (HCC)     Hyperlipidemia     Hypertension     Sciatica        Past Surgical History:   Past Surgical History:   Procedure Laterality Date    BACK SURGERY      CARPAL TUNNEL RELEASE      HIP SURGERY Right     KNEE SURGERY      OTHER SURGICAL HISTORY  5/8/2014    lumbar myelogram    VENA CAVA FILTER PLACEMENT N/A 11/7/2024    VENA CAVA FILTER INSERTION

## 2024-11-20 LAB
EKG ATRIAL RATE: 62 BPM
EKG P AXIS: 52 DEGREES
EKG P-R INTERVAL: 182 MS
EKG Q-T INTERVAL: 400 MS
EKG QRS DURATION: 92 MS
EKG QTC CALCULATION (BAZETT): 406 MS
EKG R AXIS: 27 DEGREES
EKG T AXIS: 56 DEGREES
EKG VENTRICULAR RATE: 62 BPM

## 2024-11-20 PROCEDURE — 93010 ELECTROCARDIOGRAM REPORT: CPT | Performed by: INTERNAL MEDICINE

## 2024-12-17 ENCOUNTER — OFFICE VISIT (OUTPATIENT)
Dept: VASCULAR SURGERY | Age: 74
End: 2024-12-17

## 2024-12-17 DIAGNOSIS — I82.431 ACUTE DEEP VEIN THROMBOSIS (DVT) OF POPLITEAL VEIN OF RIGHT LOWER EXTREMITY (HCC): Primary | ICD-10-CM

## 2024-12-17 DIAGNOSIS — Z95.828 S/P INSERTION OF IVC (INFERIOR VENA CAVAL) FILTER: ICD-10-CM

## 2024-12-17 PROCEDURE — 99024 POSTOP FOLLOW-UP VISIT: CPT | Performed by: SURGERY

## 2024-12-17 NOTE — PROGRESS NOTES
Vascular Surgery Progress Note    Chief Complaint   Patient presents with    Post-Op Check     S/P insertion IVC.       Patient returns for post operative evaluation status post vena cava filter insertion.  Pt recently had back surgery and was diagnosed with a R popliteal DVT.  He was considered high risk for anticoagulation due to the recent surgery and a hematoma that developed in the back near the incision.  Pt has not started on anticoagulation.  He continues to have significant back pain and is scheduled for an MRI.  He is unsure if he will be having more surgeries on the back.  The patient denies any unexpected problems since hospital discharge.        Procedure Laterality Date    BACK SURGERY      CARPAL TUNNEL RELEASE      HIP SURGERY Right     KNEE SURGERY      OTHER SURGICAL HISTORY  5/8/2014    lumbar myelogram    VENA CAVA FILTER PLACEMENT N/A 11/7/2024    VENA CAVA FILTER INSERTION performed by Allen Samson MD at [facility] OR       Physical Exam:  The incision(s) are healing without evidence of infection.  Heart rhythm is regular.           Right      Left   Brachial     Radial     Femoral     Popliteal     Dorsalis Pedis     Posterior Tibial     (3=normal, 2=diminished, 1=barely palpable, 4=widened)    Problem List Items Addressed This Visit          Circulatory    Acute deep vein thrombosis (DVT) of popliteal vein of right lower extremity (HCC) - Primary       Other    S/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       I reviewed with the patient that normal activities can be resumed as tolerated.  Pt will be going through further testing to see if he requires more surgery on his back.  At this time he is not on anticoagulation.  If pt does not require further surgeries he would like the filter removed.  I asked him to call if there are no further surgeries scheduled and he is able to be started on blood thinners and we can discuss filter removal.  He will call us as needed.      Pt seen and

## 2024-12-18 ENCOUNTER — TELEPHONE (OUTPATIENT)
Dept: VASCULAR SURGERY | Age: 74
End: 2024-12-18

## 2024-12-18 DIAGNOSIS — I82.431 ACUTE DEEP VEIN THROMBOSIS (DVT) OF POPLITEAL VEIN OF RIGHT LOWER EXTREMITY (HCC): Primary | ICD-10-CM

## 2024-12-18 NOTE — TELEPHONE ENCOUNTER
Patient called to notify office that he doesn't need to have an MRI and can be on blood thinner per Dr. Lugo.

## 2024-12-27 ENCOUNTER — TELEPHONE (OUTPATIENT)
Dept: VASCULAR SURGERY | Age: 74
End: 2024-12-27

## 2024-12-27 DIAGNOSIS — I82.431 ACUTE DEEP VEIN THROMBOSIS (DVT) OF POPLITEAL VEIN OF RIGHT LOWER EXTREMITY (HCC): ICD-10-CM

## 2024-12-27 NOTE — TELEPHONE ENCOUNTER
Notified patient that insurance denied Eliquis (did auth on CoverMyMeds, denied) and that [facility]

## 2024-12-27 NOTE — TELEPHONE ENCOUNTER
Called patient.  He is not going to require any additional surgery and is cleared per orthopedic surgery to resume anticoagulation.  I will start him on Eliquis and we will plan to have the filter removed.  He understands and agrees to the plan.  I asked him to call me with any bleeding problems.

## 2024-12-31 ENCOUNTER — TELEPHONE (OUTPATIENT)
Dept: VASCULAR SURGERY | Age: 74
End: 2024-12-31

## 2024-12-31 NOTE — TELEPHONE ENCOUNTER
Spoke with the pt, scheduled retrieval of IVC filter with Dr. Samson 1/16/25 at 1:30 pm.  He was instructed to report to [facility] 1st floor registration at 12:15 pm, be NPO after midnight the night before except heart and/or BP meds the morning of with sips of water, hold Xarelto morning of and must have transportation.

## 2025-01-03 ENCOUNTER — APPOINTMENT (OUTPATIENT)
Dept: GENERAL RADIOLOGY | Age: 75
End: 2025-01-03
Payer: MEDICARE

## 2025-01-03 ENCOUNTER — HOSPITAL ENCOUNTER (EMERGENCY)
Age: 75
Discharge: HOME | End: 2025-01-03
Attending: EMERGENCY MEDICINE
Payer: MEDICARE

## 2025-01-03 ENCOUNTER — APPOINTMENT (OUTPATIENT)
Dept: CT IMAGING | Age: 75
End: 2025-01-03
Payer: MEDICARE

## 2025-01-03 VITALS
DIASTOLIC BLOOD PRESSURE: 86 MMHG | OXYGEN SATURATION: 97 % | BODY MASS INDEX: 39.87 KG/M2 | WEIGHT: 270 LBS | HEART RATE: 80 BPM | TEMPERATURE: 97.8 F | RESPIRATION RATE: 16 BRPM | SYSTOLIC BLOOD PRESSURE: 172 MMHG

## 2025-01-03 DIAGNOSIS — S39.012A BACK STRAIN, INITIAL ENCOUNTER: Primary | ICD-10-CM

## 2025-01-03 DIAGNOSIS — S39.012A STRAIN OF LUMBAR REGION, INITIAL ENCOUNTER: ICD-10-CM
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
## 2025-01-03 DIAGNOSIS — M25.551 PAIN OF RIGHT HIP: ICD-10-CM

## 2025-01-03 PROCEDURE — 6360000002 HC RX W HCPCS: Performed by: EMERGENCY MEDICINE

## 2025-01-03 PROCEDURE — 96375 TX/PRO/DX INJ NEW DRUG ADDON: CPT

## 2025-01-03 PROCEDURE — 73502 X-RAY EXAM HIP UNI 2-3 VIEWS: CPT

## 2025-01-03 PROCEDURE — 73700 CT LOWER EXTREMITY W/O DYE: CPT

## 2025-01-03 PROCEDURE — 99284 EMERGENCY DEPT VISIT MOD MDM: CPT

## 2025-01-03 PROCEDURE — 72131 CT LUMBAR SPINE W/O DYE: CPT

## 2025-01-03 PROCEDURE — 96374 THER/PROPH/DIAG INJ IV PUSH: CPT

## 2025-01-03 RX ORDER — KETOROLAC TROMETHAMINE 30 MG/ML
15 INJECTION, SOLUTION INTRAMUSCULAR; INTRAVENOUS ONCE
Status: COMPLETED | OUTPATIENT
Start: 2025-01-03 | End: 2025-01-03

## 2025-01-03 RX ORDER — FENTANYL CITRATE 50 UG/ML
25 INJECTION, SOLUTION INTRAMUSCULAR; INTRAVENOUS ONCE
Status: COMPLETED | OUTPATIENT
Start: 2025-01-03 | End: 2025-01-03

## 2025-01-03 RX ADMIN — FENTANYL CITRATE 25 MCG: 50 INJECTION, SOLUTION INTRAMUSCULAR; INTRAVENOUS at 08:47

## 2025-01-03 RX ADMIN — KETOROLAC TROMETHAMINE 15 MG: 30 INJECTION, SOLUTION INTRAMUSCULAR at 10:48

## 2025-01-03 ASSESSMENT — PAIN DESCRIPTION - ONSET: ONSET: SUDDEN

## 2025-01-03 ASSESSMENT — PAIN SCALES - GENERAL
PAINLEVEL_OUTOF10: 8
PAINLEVEL_OUTOF10: 10

## 2025-01-03 ASSESSMENT — PAIN DESCRIPTION - FREQUENCY: FREQUENCY: CONTINUOUS

## 2025-01-03 ASSESSMENT — PAIN DESCRIPTION - DESCRIPTORS: DESCRIPTORS: SHARP;ACHING

## 2025-01-03 ASSESSMENT — PAIN DESCRIPTION - ORIENTATION: ORIENTATION: LOWER

## 2025-01-03 ASSESSMENT — PAIN - FUNCTIONAL ASSESSMENT: PAIN_FUNCTIONAL_ASSESSMENT: 0-10

## 2025-01-03 ASSESSMENT — PAIN DESCRIPTION - LOCATION: LOCATION: BACK

## 2025-01-03 NOTE — ED NOTES
Patient ambulated around department with use of cane. Patient did have limp initially & was unsteady but was able to walk normal after a few minutes of movement. Stated his hip loosens up after a few minutes & he is able to stand up straight & walk . Patient did have a bout of unsteadiness upon returning to his room. Stated it hard to go backwards.

## 2025-01-03 NOTE — DISCHARGE INSTR - COC
Continuity of Care Form    Patient Name: Javier Justin Sr.   :  1950  MRN:  [id number]    Admit date:  1/3/2025  Discharge date:  ***    Code Status Order: Prior   Advance Directives:   Advance Care Flowsheet Documentation             Admitting Physician:  No admitting provider for patient encounter.  PCP: Raúl Farias MD    Discharging Nurse: ***  Discharging Hospital Unit/Room#:   Discharging Unit Phone Number: ***    Emergency Contact:   Extended Emergency Contact Information  Primary Emergency Contact: Sunita Justin  Address: [address] [place] [facility]  Home Phone: [phone]  Work Phone: [phone]  Mobile Phone: [phone]  Relation: Spouse  Preferred language: English   needed? No    Past Surgical History:  Past Surgical History:   Procedure Laterality Date    BACK SURGERY      CARPAL TUNNEL RELEASE      HIP SURGERY Right     KNEE SURGERY      OTHER SURGICAL HISTORY  2014    lumbar myelogram    VENA CAVA FILTER PLACEMENT N/A 2024    VENA CAVA FILTER INSERTION performed by Allen Samson MD at [facility] OR       Immunization History:   Immunization History   Administered Date(s) Administered    COVID-19, PFIZER Bivalent, DO NOT Dilute, (age 12y+), IM, 30 mcg/0.3 mL 2022    COVID-19, PFIZER PURPLE top, DILUTE for use, (age 12 y+), 30mcg/0.3mL 2021, 2021, 2021    COVID-19, PFIZER, (age 12y+), IM, 30mcg/0.3mL 2023, 2024    DT (pediatric) 2007    Influenza Vaccine, unspecified formulation 10/16/1998    TD 2LF, TDVAX, (age 7y+), IM, 0.5mL 05/10/2022       Active Problems:  Patient Active Problem List   Diagnosis Code    Low back pain with right-sided sciatica M54.41    Diabetes type 2, controlled (HCC) E11.9    Essential hypertension I10    Pure hypercholesterolemia E78.00    Depression F32.A    Chronic low back pain M54.50, G89.29    Sciatica M54.30    Acute pain of right knee M25.561

## 2025-01-03 NOTE — ED PROVIDER NOTES
HPI:  1/3/25, Time: 7:51 AM BRANDY Justin Sr. is a 74 y.o. male presenting to the ED for pain in his right hip.  Patient said he fell out of bed yesterday getting his legs and arms tangled up in the blankets.  Landing on his left hip lower back.  Patient states he has constant pain in his lower back and fusion of the lumbar spine by Dr. Lugo 10 years ago.  Patient said he has pain when he sits in his right hip and pelvis appears also hurts to ambulate and stand., beginning 24 hours ago.  The complaint has been persistent, moderate in severity, and worsened by standing, sitting, walking.      Review of Systems:   A complete review of systems was performed and pertinent positives and negatives are stated within HPI, all other systems reviewed and are negative.    --------------------------------------------- PAST HISTORY ---------------------------------------------  Past Medical History:  has a past medical history of Anxiety, Back pain, Diabetes mellitus (HCC), Hyperlipidemia, Hypertension, and Sciatica.    Past Surgical History:  has a past surgical history that includes back surgery; knee surgery; other surgical history (5/8/2014); Carpal tunnel release; hip surgery (Right); and Vena Cava Filter Placement (N/A, 11/7/2024).    Social History:  reports that he quit smoking about 10 years ago. His smoking use included cigarettes. He started smoking about 31 years ago. He has a 20 pack-year smoking history. He has never used smokeless tobacco. He reports that he does not drink alcohol and does not use drugs.    Family History: family history is not on file.     The patient’s home medications have been reviewed.    Allergies: Hydrocodone    -------------------------------------------------- RESULTS -------------------------------------------------  All laboratory and radiology results have been personally reviewed by myself   LABS:  No results found for this visit on  01/03/25.    RADIOLOGY:  Interpreted by Radiologist.  CT HIP RIGHT WO CONTRAST   Final Result   No evidence of acute bony abnormality.      Degenerative changes.         XR HIP 2-3 VW W PELVIS RIGHT   Final Result   Degenerative changes seen with the left sacroiliac joint and hips   bilaterally. No acute bony abnormality.         CT LUMBAR SPINE WO CONTRAST   Final Result   1. No evidence of acute fracture or subluxation.   2. Extensive postoperative changes from L1-2 thru S1 with multilevel   laminectomies.   3. There is mild stenosis at T12-L1.             ------------------------- NURSING NOTES AND VITALS REVIEWED ---------------------------   The nursing notes within the ED encounter and vital signs as below have been reviewed.   BP (!) 172/86   Pulse 80   Temp 97.8 °F (36.6 °C)   Resp 16   Wt 122.5 kg (270 lb)   SpO2 97%   BMI 39.87 kg/m²   Oxygen Saturation Interpretation: Normal      ---------------------------------------------------PHYSICAL EXAM--------------------------------------      Constitutional/General: Alert and oriented x3, well appearing, non toxic in NAD  Head: Normocephalic and atraumatic  Eyes: PERRL, EOMI  Mouth: Oropharynx clear, handling secretions, no trismus  Neck: Supple, full ROM,   Pulmonary: Lungs clear to auscultation bilaterally, no wheezes, rales, or rhonchi. Not in respiratory distress, chest wall nontender.  Lung sounds equal bilaterally.  Cardiovascular:  Regular rate and rhythm, no murmurs, gallops, or rubs. 2+ distal pulses  Abdomen: Soft, non tender, non distended,   Extremities: Moves all extremities x 4. Warm and well perfused, tenderness to lateral trochanter on the right.  Patient also has tenderness to the pubic symphysis.  There is no shortening.  Pain with internal/external rotation.  2+ pulses palpable in his right foot.  Skin: warm and dry without rash  Neurologic: GCS 15,  Psych: Normal Affect    ------------------------------ ED COURSE/MEDICAL DECISION

## 2025-01-13 ENCOUNTER — TELEPHONE (OUTPATIENT)
Dept: VASCULAR SURGERY | Age: 75
End: 2025-01-13

## 2025-01-13 NOTE — TELEPHONE ENCOUNTER
Notified pt of time change for 1/16/25 IVC filter removal, will report at 10:30 a.m.  Instructions also reviewed to include being NPO after midnight except heart and/or BP meds, holding Xarelto day of and having transportation.

## 2025-01-16 ENCOUNTER — HOSPITAL ENCOUNTER (OUTPATIENT)
Age: 75
Setting detail: OUTPATIENT SURGERY
Discharge: HOME | End: 2025-01-16
Attending: SURGERY | Admitting: SURGERY
Payer: MEDICARE

## 2025-01-16 ENCOUNTER — HOSPITAL ENCOUNTER (EMERGENCY)
Age: 75
Discharge: HOME | End: 2025-01-16
Attending: STUDENT IN AN ORGANIZED HEALTH CARE EDUCATION/TRAINING PROGRAM
Payer: MEDICARE

## 2025-01-16 ENCOUNTER — APPOINTMENT (OUTPATIENT)
Dept: GENERAL RADIOLOGY | Age: 75
End: 2025-01-16
Attending: SURGERY
Payer: MEDICARE

## 2025-01-16 VITALS
SYSTOLIC BLOOD PRESSURE: 152 MMHG | HEIGHT: 69 IN | OXYGEN SATURATION: 95 % | HEART RATE: 52 BPM | DIASTOLIC BLOOD PRESSURE: 90 MMHG | RESPIRATION RATE: 20 BRPM | BODY MASS INDEX: 38.51 KG/M2 | WEIGHT: 260 LBS | TEMPERATURE: 97.6 F

## 2025-01-16 VITALS
WEIGHT: 260 LBS | TEMPERATURE: 97.9 F | DIASTOLIC BLOOD PRESSURE: 65 MMHG | HEIGHT: 69 IN | OXYGEN SATURATION: 97 % | HEART RATE: 70 BPM | SYSTOLIC BLOOD PRESSURE: 135 MMHG | BODY MASS INDEX: 38.51 KG/M2 | RESPIRATION RATE: 14 BRPM

## 2025-01-16 DIAGNOSIS — R73.9 HYPERGLYCEMIA: ICD-10-CM

## 2025-01-16 DIAGNOSIS — M25.552 BILATERAL HIP PAIN: Primary | ICD-10-CM

## 2025-01-16 DIAGNOSIS — I82.431 EMBOLISM AND THROMBOSIS OF RIGHT POPLITEAL VEIN (HCC): ICD-10-CM

## 2025-01-16 DIAGNOSIS — M25.551 BILATERAL HIP PAIN: Primary | ICD-10-CM

## 2025-01-16 DIAGNOSIS — E87.20 LACTIC ACIDOSIS: ICD-10-CM

## 2025-01-16 LAB
ABO + RH BLD: NORMAL
ALBUMIN SERPL-MCNC: 4.3 G/DL (ref 3.5–5.2)
ALP SERPL-CCNC: 133 U/L (ref 40–129)
ALT SERPL-CCNC: 27 U/L (ref 0–40)
ANION GAP SERPL CALCULATED.3IONS-SCNC: 13 MMOL/L (ref 7–16)
ANION GAP SERPL CALCULATED.3IONS-SCNC: 16 MMOL/L (ref 7–16)
ANION GAP SERPL CALCULATED.3IONS-SCNC: 17 MMOL/L (ref 7–16)
ARM BAND NUMBER: NORMAL
AST SERPL-CCNC: 37 U/L (ref 0–39)
B-OH-BUTYR SERPL-MCNC: 0.15 MMOL/L (ref 0.02–0.27)
BASOPHILS # BLD: 0.07 K/UL (ref 0–0.2)
BASOPHILS NFR BLD: 1 % (ref 0–2)
BILIRUB SERPL-MCNC: 0.6 MG/DL (ref 0–1.2)
BILIRUB UR QL STRIP: NEGATIVE
BLOOD BANK SAMPLE EXPIRATION: NORMAL
BLOOD GROUP ANTIBODIES SERPL: NEGATIVE
BUN SERPL-MCNC: 21 MG/DL (ref 6–23)
BUN SERPL-MCNC: 25 MG/DL (ref 6–23)
BUN SERPL-MCNC: 26 MG/DL (ref 6–23)
CALCIUM SERPL-MCNC: 9.1 MG/DL (ref 8.6–10.2)
CALCIUM SERPL-MCNC: 9.4 MG/DL (ref 8.6–10.2)
CALCIUM SERPL-MCNC: 9.5 MG/DL (ref 8.6–10.2)
CHLORIDE SERPL-SCNC: 96 MMOL/L (ref 98–107)
CHLORIDE SERPL-SCNC: 98 MMOL/L (ref 98–107)
CHLORIDE SERPL-SCNC: 98 MMOL/L (ref 98–107)
CLARITY UR: CLEAR
CO2 SERPL-SCNC: 23 MMOL/L (ref 22–29)
CO2 SERPL-SCNC: 24 MMOL/L (ref 22–29)
CO2 SERPL-SCNC: 25 MMOL/L (ref 22–29)
COLOR UR: YELLOW
CREAT SERPL-MCNC: 1 MG/DL (ref 0.7–1.2)
CREAT SERPL-MCNC: 1.1 MG/DL (ref 0.7–1.2)
CREAT SERPL-MCNC: 1.2 MG/DL (ref 0.7–1.2)
CRP SERPL HS-MCNC: 12 MG/L (ref 0–5)
ECHO BSA: 2.4 M2
EOSINOPHIL # BLD: 0.04 K/UL (ref 0.05–0.5)
EOSINOPHILS RELATIVE PERCENT: 0 % (ref 0–6)
ERYTHROCYTE [DISTWIDTH] IN BLOOD BY AUTOMATED COUNT: 14.9 % (ref 11.5–15)
ERYTHROCYTE [DISTWIDTH] IN BLOOD BY AUTOMATED COUNT: 15 % (ref 11.5–15)
ERYTHROCYTE [SEDIMENTATION RATE] IN BLOOD BY WESTERGREN METHOD: 48 MM/HR (ref 0–15)
GFR, ESTIMATED: 67 ML/MIN/1.73M2
GFR, ESTIMATED: 74 ML/MIN/1.73M2
GFR, ESTIMATED: 77 ML/MIN/1.73M2
GLUCOSE SERPL-MCNC: 166 MG/DL (ref 74–99)
GLUCOSE SERPL-MCNC: 211 MG/DL (ref 74–99)
GLUCOSE SERPL-MCNC: 342 MG/DL (ref 74–99)
GLUCOSE UR STRIP-MCNC: 500 MG/DL
HCT VFR BLD AUTO: 36.2 % (ref 37–54)
HCT VFR BLD AUTO: 36.6 % (ref 37–54)
HGB BLD-MCNC: 11.5 G/DL (ref 12.5–16.5)
HGB BLD-MCNC: 11.9 G/DL (ref 12.5–16.5)
HGB UR QL STRIP.AUTO: NEGATIVE
IMM GRANULOCYTES # BLD AUTO: 0.04 K/UL (ref 0–0.58)
IMM GRANULOCYTES NFR BLD: 0 % (ref 0–5)
KETONES UR STRIP-MCNC: NEGATIVE MG/DL
LACTATE BLDV-SCNC: 2.5 MMOL/L (ref 0.5–2.2)
LACTATE BLDV-SCNC: <0.2 MMOL/L (ref 0.5–2.2)
LEUKOCYTE ESTERASE UR QL STRIP: NEGATIVE
LYMPHOCYTES NFR BLD: 2.28 K/UL (ref 1.5–4)
LYMPHOCYTES RELATIVE PERCENT: 16 % (ref 20–42)
MCH RBC QN AUTO: 25.5 PG (ref 26–35)
MCH RBC QN AUTO: 25.6 PG (ref 26–35)
MCHC RBC AUTO-ENTMCNC: 31.8 G/DL (ref 32–34.5)
MCHC RBC AUTO-ENTMCNC: 32.5 G/DL (ref 32–34.5)
MCV RBC AUTO: 78.5 FL (ref 80–99.9)
MCV RBC AUTO: 80.6 FL (ref 80–99.9)
MONOCYTES NFR BLD: 0.99 K/UL (ref 0.1–0.95)
MONOCYTES NFR BLD: 7 % (ref 2–12)
NEUTROPHILS NFR BLD: 77 % (ref 43–80)
NEUTS SEG NFR BLD: 11.17 K/UL (ref 1.8–7.3)
NITRITE UR QL STRIP: NEGATIVE
PH UR STRIP: 5.5 [PH] (ref 5–9)
PLATELET # BLD AUTO: 312 K/UL (ref 130–450)
PLATELET # BLD AUTO: 343 K/UL (ref 130–450)
PMV BLD AUTO: 11.3 FL (ref 7–12)
PMV BLD AUTO: 11.3 FL (ref 7–12)
POTASSIUM SERPL-SCNC: 4 MMOL/L (ref 3.5–5)
POTASSIUM SERPL-SCNC: 4.1 MMOL/L (ref 3.5–5)
POTASSIUM SERPL-SCNC: 4.1 MMOL/L (ref 3.5–5)
PROT SERPL-MCNC: 7.7 G/DL (ref 6.4–8.3)
PROT UR STRIP-MCNC: ABNORMAL MG/DL
RBC # BLD AUTO: 4.49 M/UL (ref 3.8–5.8)
RBC # BLD AUTO: 4.66 M/UL (ref 3.8–5.8)
RBC #/AREA URNS HPF: ABNORMAL /HPF
SODIUM SERPL-SCNC: 136 MMOL/L (ref 132–146)
SODIUM SERPL-SCNC: 137 MMOL/L (ref 132–146)
SODIUM SERPL-SCNC: 137 MMOL/L (ref 132–146)
SP GR UR STRIP: 1.02 (ref 1–1.03)
UROBILINOGEN UR STRIP-ACNC: 0.2 EU/DL (ref 0–1)
WBC #/AREA URNS HPF: ABNORMAL /HPF
WBC OTHER # BLD: 12.7 K/UL (ref 4.5–11.5)
WBC OTHER # BLD: 14.6 K/UL (ref 4.5–11.5)

## 2025-01-16 PROCEDURE — 85025 COMPLETE CBC W/AUTO DIFF WBC: CPT

## 2025-01-16 PROCEDURE — C1894 INTRO/SHEATH, NON-LASER: HCPCS | Performed by: SURGERY

## 2025-01-16 PROCEDURE — 71045 X-RAY EXAM CHEST 1 VIEW: CPT

## 2025-01-16 PROCEDURE — 86901 BLOOD TYPING SEROLOGIC RH(D): CPT

## 2025-01-16 PROCEDURE — 86140 C-REACTIVE PROTEIN: CPT

## 2025-01-16 PROCEDURE — 37193 REM ENDOVAS VENA CAVA FILTER: CPT | Performed by: SURGERY

## 2025-01-16 PROCEDURE — 7100000010 HC PHASE II RECOVERY - FIRST 15 MIN: Performed by: SURGERY

## 2025-01-16 PROCEDURE — 2580000003 HC RX 258: Performed by: NURSE PRACTITIONER

## 2025-01-16 PROCEDURE — 86850 RBC ANTIBODY SCREEN: CPT

## 2025-01-16 PROCEDURE — 81001 URINALYSIS AUTO W/SCOPE: CPT

## 2025-01-16 PROCEDURE — 80048 BASIC METABOLIC PNL TOTAL CA: CPT

## 2025-01-16 PROCEDURE — 2709999900 HC NON-CHARGEABLE SUPPLY: Performed by: SURGERY

## 2025-01-16 PROCEDURE — 86900 BLOOD TYPING SEROLOGIC ABO: CPT

## 2025-01-16 PROCEDURE — 2500000003 HC RX 250 WO HCPCS: Performed by: NURSE PRACTITIONER

## 2025-01-16 PROCEDURE — 82010 KETONE BODYS QUAN: CPT

## 2025-01-16 PROCEDURE — 85027 COMPLETE CBC AUTOMATED: CPT

## 2025-01-16 PROCEDURE — 80053 COMPREHEN METABOLIC PANEL: CPT

## 2025-01-16 PROCEDURE — 6360000002 HC RX W HCPCS: Performed by: SURGERY

## 2025-01-16 PROCEDURE — 7100000011 HC PHASE II RECOVERY - ADDTL 15 MIN: Performed by: SURGERY

## 2025-01-16 PROCEDURE — 6360000004 HC RX CONTRAST MEDICATION: Performed by: SURGERY

## 2025-01-16 PROCEDURE — 83605 ASSAY OF LACTIC ACID: CPT

## 2025-01-16 PROCEDURE — C1880 VENA CAVA FILTER: HCPCS | Performed by: SURGERY

## 2025-01-16 PROCEDURE — 85652 RBC SED RATE AUTOMATED: CPT

## 2025-01-16 RX ORDER — SODIUM CHLORIDE 9 MG/ML
INJECTION, SOLUTION INTRAVENOUS PRN
Status: DISCONTINUED | OUTPATIENT
Start: 2025-01-16 | End: 2025-01-16 | Stop reason: HOSPADM

## 2025-01-16 RX ORDER — HYDROMORPHONE HYDROCHLORIDE 1 MG/ML
1 INJECTION, SOLUTION INTRAMUSCULAR; INTRAVENOUS; SUBCUTANEOUS ONCE
Status: COMPLETED | OUTPATIENT
Start: 2025-01-16 | End: 2025-01-16

## 2025-01-16 RX ORDER — MIDAZOLAM HYDROCHLORIDE 1 MG/ML
INJECTION, SOLUTION INTRAMUSCULAR; INTRAVENOUS PRN
Status: DISCONTINUED | OUTPATIENT
Start: 2025-01-16 | End: 2025-01-16 | Stop reason: HOSPADM

## 2025-01-16 RX ORDER — 0.9 % SODIUM CHLORIDE 0.9 %
1000 INTRAVENOUS SOLUTION INTRAVENOUS ONCE
Status: COMPLETED | OUTPATIENT
Start: 2025-01-16 | End: 2025-01-16

## 2025-01-16 RX ORDER — SODIUM CHLORIDE 9 MG/ML
INJECTION, SOLUTION INTRAVENOUS CONTINUOUS
Status: DISCONTINUED | OUTPATIENT
Start: 2025-01-16 | End: 2025-01-16

## 2025-01-16 RX ORDER — IOPAMIDOL 612 MG/ML
INJECTION, SOLUTION INTRAVASCULAR PRN
Status: DISCONTINUED | OUTPATIENT
Start: 2025-01-16 | End: 2025-01-16 | Stop reason: HOSPADM

## 2025-01-16 RX ORDER — OXYCODONE AND ACETAMINOPHEN 7.5; 325 MG/1; MG/1
1 TABLET ORAL EVERY 6 HOURS PRN
COMMUNITY

## 2025-01-16 RX ORDER — SODIUM CHLORIDE 0.9 % (FLUSH) 0.9 %
5-40 SYRINGE (ML) INJECTION EVERY 12 HOURS SCHEDULED
Status: DISCONTINUED | OUTPATIENT
Start: 2025-01-16 | End: 2025-01-16 | Stop reason: HOSPADM

## 2025-01-16 RX ORDER — CYCLOBENZAPRINE HCL 10 MG
10 TABLET ORAL NIGHTLY PRN
Qty: 10 TABLET | Refills: 0 | Status: SHIPPED | OUTPATIENT
Start: 2025-01-16 | End: 2025-01-26

## 2025-01-16 RX ORDER — FENTANYL CITRATE 50 UG/ML
INJECTION, SOLUTION INTRAMUSCULAR; INTRAVENOUS PRN
Status: DISCONTINUED | OUTPATIENT
Start: 2025-01-16 | End: 2025-01-16 | Stop reason: HOSPADM

## 2025-01-16 RX ORDER — SODIUM CHLORIDE 0.9 % (FLUSH) 0.9 %
5-40 SYRINGE (ML) INJECTION PRN
Status: DISCONTINUED | OUTPATIENT
Start: 2025-01-16 | End: 2025-01-16 | Stop reason: HOSPADM

## 2025-01-16 RX ORDER — LIDOCAINE HCL/PF 100 MG/5ML
SYRINGE (ML) INJECTION PRN
Status: DISCONTINUED | OUTPATIENT
Start: 2025-01-16 | End: 2025-01-16 | Stop reason: HOSPADM

## 2025-01-16 RX ADMIN — HYDROMORPHONE HYDROCHLORIDE 1 MG: 1 INJECTION, SOLUTION INTRAMUSCULAR; INTRAVENOUS; SUBCUTANEOUS at 21:09

## 2025-01-16 RX ADMIN — HYDROMORPHONE HYDROCHLORIDE 1 MG: 1 INJECTION, SOLUTION INTRAMUSCULAR; INTRAVENOUS; SUBCUTANEOUS at 16:26

## 2025-01-16 RX ADMIN — SODIUM CHLORIDE 1000 ML: 9 INJECTION, SOLUTION INTRAVENOUS at 17:25

## 2025-01-16 ASSESSMENT — PAIN DESCRIPTION - ONSET: ONSET: SUDDEN

## 2025-01-16 ASSESSMENT — PAIN DESCRIPTION - ORIENTATION
ORIENTATION: RIGHT;LEFT

## 2025-01-16 ASSESSMENT — PAIN DESCRIPTION - PAIN TYPE: TYPE: ACUTE PAIN

## 2025-01-16 ASSESSMENT — PAIN DESCRIPTION - FREQUENCY: FREQUENCY: CONTINUOUS

## 2025-01-16 ASSESSMENT — PAIN DESCRIPTION - LOCATION
LOCATION: HIP;LEG
LOCATION: HIP
LOCATION: HIP

## 2025-01-16 ASSESSMENT — PAIN DESCRIPTION - DESCRIPTORS
DESCRIPTORS: ACHING
DESCRIPTORS: SHOOTING;SHARP

## 2025-01-16 ASSESSMENT — PAIN - FUNCTIONAL ASSESSMENT
PAIN_FUNCTIONAL_ASSESSMENT: NONE - DENIES PAIN
PAIN_FUNCTIONAL_ASSESSMENT: 0-10

## 2025-01-16 ASSESSMENT — PAIN SCALES - GENERAL
PAINLEVEL_OUTOF10: 10
PAINLEVEL_OUTOF10: 10
PAINLEVEL_OUTOF10: 7

## 2025-01-16 NOTE — PROGRESS NOTES
Patient up to BR, voided, nan well. IV removed. Discharge instructions given, patient verbalizes understanding. Patient discharged to home.

## 2025-01-16 NOTE — PROGRESS NOTES
Patient to CVL recovery area post IVC filter removal. Patient A&O x 3. VSS. Right side of neck site soft, without oozing or hematoma. Dressing clean and dry. Patient taking PO well.

## 2025-01-16 NOTE — POST SEDATION
Sedation Post Procedure Note    Patient Name: Javier Justin Sr.   YOB: 1950  Room/Bed: Cath Pool Room/  Medical Record Number: [id number]  Date: 1/16/2025   Time: 11:27 AM         Physicians/Assistants: Allen Samson MD, MD    Procedure Performed: Vena cava filter retrieval    Post-Sedation Vital Signs:  Vitals:    01/16/25 1100   BP: (!) 153/86   Pulse: 53   Resp: 19   Temp:    SpO2: 93%      Vital signs were reviewed and were stable after the procedure (see flow sheet for vitals)            Post-Sedation Exam: No changes           Complications: none    Electronically signed by Allen Samson MD on 1/16/2025 at 11:27 AM

## 2025-01-17 NOTE — ED PROVIDER NOTES
x 4. Warm and well perfused, no clubbing, cyanosis, or edema. Capillary refill <3 seconds  Integument: skin warm and dry. No rashes.   Lymphatic: no lymphadenopathy noted  Neurologic: GCS 15, no focal deficits, symmetric strength 5/5 in the upper and lower extremities bilaterally  Psychiatric: Normal Affect    DIAGNOSTIC RESULTS   (All laboratory and radiology results have been personally reviewed by myself)  Labs:  Results for orders placed or performed during the hospital encounter of 01/16/25   CBC with Auto Differential   Result Value Ref Range    WBC 14.6 (H) 4.5 - 11.5 k/uL    RBC 4.66 3.80 - 5.80 m/uL    Hemoglobin 11.9 (L) 12.5 - 16.5 g/dL    Hematocrit 36.6 (L) 37.0 - 54.0 %    MCV 78.5 (L) 80.0 - 99.9 fL    MCH 25.5 (L) 26.0 - 35.0 pg    MCHC 32.5 32.0 - 34.5 g/dL    RDW 15.0 11.5 - 15.0 %    Platelets 343 130 - 450 k/uL    MPV 11.3 7.0 - 12.0 fL    Neutrophils % 77 43.0 - 80.0 %    Lymphocytes % 16 (L) 20.0 - 42.0 %    Monocytes % 7 2.0 - 12.0 %    Eosinophils % 0 0 - 6 %    Basophils % 1 0.0 - 2.0 %    Immature Granulocytes % 0 0.0 - 5.0 %    Neutrophils Absolute 11.17 (H) 1.80 - 7.30 k/uL    Lymphocytes Absolute 2.28 1.50 - 4.00 k/uL    Monocytes Absolute 0.99 (H) 0.10 - 0.95 k/uL    Eosinophils Absolute 0.04 (L) 0.05 - 0.50 k/uL    Basophils Absolute 0.07 0.00 - 0.20 k/uL    Immature Granulocytes Absolute 0.04 0.00 - 0.58 k/uL   Comprehensive Metabolic Panel   Result Value Ref Range    Sodium 137 132 - 146 mmol/L    Potassium 4.0 3.5 - 5.0 mmol/L    Chloride 96 (L) 98 - 107 mmol/L    CO2 24 22 - 29 mmol/L    Anion Gap 17 (H) 7 - 16 mmol/L    Glucose 342 (H) 74 - 99 mg/dL    BUN 25 (H) 6 - 23 mg/dL    Creatinine 1.2 0.70 - 1.20 mg/dL    Est, Glom Filt Rate 67 >60 mL/min/1.73m2    Calcium 9.4 8.6 - 10.2 mg/dL    Total Protein 7.7 6.4 - 8.3 g/dL    Albumin 4.3 3.5 - 5.2 g/dL    Total Bilirubin 0.6 0.0 - 1.2 mg/dL    Alkaline Phosphatase 133 (H) 40 - 129 U/L    ALT 27 0 - 40 U/L    AST 37 0 - 39 U/L

## 2025-02-10 DIAGNOSIS — E11.65 TYPE 2 DIABETES MELLITUS WITH HYPERGLYCEMIA, WITH LONG-TERM CURRENT USE OF INSULIN (HCC): Primary | ICD-10-CM

## 2025-02-10 DIAGNOSIS — Z79.4 TYPE 2 DIABETES MELLITUS WITH HYPERGLYCEMIA, WITH LONG-TERM CURRENT USE OF INSULIN (HCC): Primary | ICD-10-CM

## 2025-02-10 RX ORDER — INSULIN LISPRO 100 [IU]/ML
INJECTION, SOLUTION INTRAVENOUS; SUBCUTANEOUS
Qty: 45 ML | Refills: 3 | Status: SHIPPED | OUTPATIENT
Start: 2025-02-10

## 2025-02-10 RX ORDER — ACYCLOVIR 400 MG/1
TABLET ORAL
Qty: 9 EACH | Refills: 3 | Status: SHIPPED | OUTPATIENT
Start: 2025-02-10

## 2025-02-10 RX ORDER — PEN NEEDLE, DIABETIC 32 GX 1/4"
NEEDLE, DISPOSABLE MISCELLANEOUS
Qty: 400 EACH | Refills: 3 | Status: SHIPPED | OUTPATIENT
Start: 2025-02-10

## 2025-02-10 RX ORDER — INSULIN GLARGINE 100 [IU]/ML
INJECTION, SOLUTION SUBCUTANEOUS
Qty: 30 ML | Refills: 3 | Status: SHIPPED | OUTPATIENT
Start: 2025-02-10

## 2025-02-11 ENCOUNTER — OFFICE VISIT (OUTPATIENT)
Dept: VASCULAR SURGERY | Age: 75
End: 2025-02-11

## 2025-02-11 VITALS — DIASTOLIC BLOOD PRESSURE: 80 MMHG | SYSTOLIC BLOOD PRESSURE: 142 MMHG | BODY MASS INDEX: 38.4 KG/M2 | WEIGHT: 260 LBS

## 2025-02-11 DIAGNOSIS — Z95.828 S/P INSERTION OF IVC (INFERIOR VENA CAVAL) FILTER: Primary | ICD-10-CM

## 2025-02-11 PROCEDURE — 99024 POSTOP FOLLOW-UP VISIT: CPT | Performed by: SURGERY

## 2025-02-11 NOTE — PROGRESS NOTES
Vascular Surgery Progress Note    Chief Complaint   Patient presents with    Post-Op Check     F/u IVC filter complains of dizziness       Patient returns for post operative evaluation status post vena cava filter retrieval.  The patient denies any unexpected problems since hospital discharge.        Procedure Laterality Date    BACK SURGERY      CARPAL TUNNEL RELEASE      HIP SURGERY Right     INVASIVE VASCULAR N/A 1/16/2025    Remove vena cava filter performed by Allen Samson MD at [facility]ARDIAC CATH LAB    KNEE SURGERY      OTHER SURGICAL HISTORY  5/8/2014    lumbar myelogram    VENA CAVA FILTER PLACEMENT N/A 11/7/2024    VENA CAVA FILTER INSERTION performed by Allen Samson MD at [facility] OR       Physical Exam:  The incision is healing without evidence of infection.  Heart rhythm is regular.          Right      Left   Brachial     Radial     Femoral     Popliteal     Dorsalis Pedis     Posterior Tibial     (3=normal, 2=diminished, 1=barely palpable, 4=widened)    Problem List Items Addressed This Visit       S/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 - Primary       I reviewed with the patient that normal activities can be resumed as tolerated.    From my standpoint I reviewed with them that he can complete this current prescription of anticoagulation and then discontinue it.  I did not schedule him for a follow-up appointment but will be happy to see him again in the future with any changes or new problems.  He understands and agrees with plan.

## 2025-02-28 ENCOUNTER — APPOINTMENT (OUTPATIENT)
Dept: CT IMAGING | Age: 75
End: 2025-02-28
Payer: MEDICARE

## 2025-02-28 ENCOUNTER — HOSPITAL ENCOUNTER (EMERGENCY)
Age: 75
Discharge: HOME | End: 2025-02-28
Attending: STUDENT IN AN ORGANIZED HEALTH CARE EDUCATION/TRAINING PROGRAM
Payer: MEDICARE

## 2025-02-28 ENCOUNTER — APPOINTMENT (OUTPATIENT)
Dept: GENERAL RADIOLOGY | Age: 75
End: 2025-02-28
Payer: MEDICARE

## 2025-02-28 VITALS
TEMPERATURE: 98 F | DIASTOLIC BLOOD PRESSURE: 80 MMHG | BODY MASS INDEX: 38.4 KG/M2 | SYSTOLIC BLOOD PRESSURE: 172 MMHG | OXYGEN SATURATION: 99 % | HEART RATE: 92 BPM | WEIGHT: 260 LBS | RESPIRATION RATE: 18 BRPM

## 2025-02-28 DIAGNOSIS — F41.1 ANXIETY STATE: ICD-10-CM

## 2025-02-28 DIAGNOSIS — M54.50 ACUTE EXACERBATION OF CHRONIC LOW BACK PAIN: Primary | ICD-10-CM

## 2025-02-28 DIAGNOSIS — G89.29 ACUTE EXACERBATION OF CHRONIC LOW BACK PAIN: Primary | ICD-10-CM

## 2025-02-28 LAB
ANION GAP SERPL CALCULATED.3IONS-SCNC: 15 MMOL/L (ref 7–16)
BASOPHILS # BLD: 0.06 K/UL (ref 0–0.2)
BASOPHILS NFR BLD: 1 % (ref 0–2)
BUN SERPL-MCNC: 18 MG/DL (ref 6–23)
CALCIUM SERPL-MCNC: 9.5 MG/DL (ref 8.6–10.2)
CHLORIDE SERPL-SCNC: 100 MMOL/L (ref 98–107)
CO2 SERPL-SCNC: 23 MMOL/L (ref 22–29)
CREAT SERPL-MCNC: 1.1 MG/DL (ref 0.7–1.2)
D-DIMER QUANTITATIVE: 955 NG/ML DDU (ref 0–230)
EOSINOPHIL # BLD: 0.2 K/UL (ref 0.05–0.5)
EOSINOPHILS RELATIVE PERCENT: 2 % (ref 0–6)
ERYTHROCYTE [DISTWIDTH] IN BLOOD BY AUTOMATED COUNT: 15.6 % (ref 11.5–15)
GFR, ESTIMATED: 71 ML/MIN/1.73M2
GLUCOSE SERPL-MCNC: 246 MG/DL (ref 74–99)
HCT VFR BLD AUTO: 35.7 % (ref 37–54)
HGB BLD-MCNC: 11.8 G/DL (ref 12.5–16.5)
IMM GRANULOCYTES # BLD AUTO: 0.04 K/UL (ref 0–0.58)
IMM GRANULOCYTES NFR BLD: 0 % (ref 0–5)
LYMPHOCYTES NFR BLD: 2.42 K/UL (ref 1.5–4)
LYMPHOCYTES RELATIVE PERCENT: 26 % (ref 20–42)
MCH RBC QN AUTO: 25.8 PG (ref 26–35)
MCHC RBC AUTO-ENTMCNC: 33.1 G/DL (ref 32–34.5)
MCV RBC AUTO: 78.1 FL (ref 80–99.9)
MONOCYTES NFR BLD: 1.13 K/UL (ref 0.1–0.95)
MONOCYTES NFR BLD: 12 % (ref 2–12)
NEUTROPHILS NFR BLD: 58 % (ref 43–80)
NEUTS SEG NFR BLD: 5.38 K/UL (ref 1.8–7.3)
PLATELET # BLD AUTO: 229 K/UL (ref 130–450)
PMV BLD AUTO: 10.7 FL (ref 7–12)
POTASSIUM SERPL-SCNC: 3.7 MMOL/L (ref 3.5–5)
RBC # BLD AUTO: 4.57 M/UL (ref 3.8–5.8)
SODIUM SERPL-SCNC: 138 MMOL/L (ref 132–146)
TROPONIN I SERPL HS-MCNC: 25 NG/L (ref 0–11)
TROPONIN I SERPL HS-MCNC: 27 NG/L (ref 0–11)
WBC OTHER # BLD: 9.2 K/UL (ref 4.5–11.5)

## 2025-02-28 PROCEDURE — 71045 X-RAY EXAM CHEST 1 VIEW: CPT

## 2025-02-28 PROCEDURE — 96361 HYDRATE IV INFUSION ADD-ON: CPT

## 2025-02-28 PROCEDURE — 96374 THER/PROPH/DIAG INJ IV PUSH: CPT

## 2025-02-28 PROCEDURE — 6360000004 HC RX CONTRAST MEDICATION: Performed by: RADIOLOGY

## 2025-02-28 PROCEDURE — 2580000003 HC RX 258: Performed by: STUDENT IN AN ORGANIZED HEALTH CARE EDUCATION/TRAINING PROGRAM

## 2025-02-28 PROCEDURE — 85379 FIBRIN DEGRADATION QUANT: CPT

## 2025-02-28 PROCEDURE — 84484 ASSAY OF TROPONIN QUANT: CPT

## 2025-02-28 PROCEDURE — 85025 COMPLETE CBC W/AUTO DIFF WBC: CPT

## 2025-02-28 PROCEDURE — 99285 EMERGENCY DEPT VISIT HI MDM: CPT

## 2025-02-28 PROCEDURE — 80048 BASIC METABOLIC PNL TOTAL CA: CPT

## 2025-02-28 PROCEDURE — 6360000002 HC RX W HCPCS: Performed by: STUDENT IN AN ORGANIZED HEALTH CARE EDUCATION/TRAINING PROGRAM

## 2025-02-28 PROCEDURE — 71275 CT ANGIOGRAPHY CHEST: CPT

## 2025-02-28 RX ORDER — LORAZEPAM 2 MG/ML
2 INJECTION INTRAMUSCULAR ONCE
Status: DISCONTINUED | OUTPATIENT
Start: 2025-02-28 | End: 2025-02-28

## 2025-02-28 RX ORDER — LORAZEPAM 2 MG/ML
1 INJECTION INTRAMUSCULAR ONCE
Status: COMPLETED | OUTPATIENT
Start: 2025-02-28 | End: 2025-02-28

## 2025-02-28 RX ORDER — 0.9 % SODIUM CHLORIDE 0.9 %
1000 INTRAVENOUS SOLUTION INTRAVENOUS ONCE
Status: COMPLETED | OUTPATIENT
Start: 2025-02-28 | End: 2025-02-28

## 2025-02-28 RX ORDER — IOPAMIDOL 755 MG/ML
75 INJECTION, SOLUTION INTRAVASCULAR
Status: COMPLETED | OUTPATIENT
Start: 2025-02-28 | End: 2025-02-28

## 2025-02-28 RX ADMIN — LORAZEPAM 1 MG: 2 INJECTION INTRAMUSCULAR; INTRAVENOUS at 17:39

## 2025-02-28 RX ADMIN — SODIUM CHLORIDE 1000 ML: 0.9 INJECTION, SOLUTION INTRAVENOUS at 17:41

## 2025-02-28 RX ADMIN — IOPAMIDOL 75 ML: 755 INJECTION, SOLUTION INTRAVENOUS at 18:55

## 2025-02-28 ASSESSMENT — PAIN DESCRIPTION - ORIENTATION: ORIENTATION: LEFT

## 2025-02-28 ASSESSMENT — PAIN DESCRIPTION - DESCRIPTORS: DESCRIPTORS: ACHING;DISCOMFORT;DULL

## 2025-02-28 ASSESSMENT — PAIN - FUNCTIONAL ASSESSMENT: PAIN_FUNCTIONAL_ASSESSMENT: 0-10

## 2025-02-28 ASSESSMENT — ENCOUNTER SYMPTOMS
ABDOMINAL PAIN: 0
BACK PAIN: 1
PHOTOPHOBIA: 0
DIARRHEA: 0
COUGH: 0
SORE THROAT: 0
SHORTNESS OF BREATH: 1
NAUSEA: 0

## 2025-02-28 ASSESSMENT — PAIN SCALES - GENERAL: PAINLEVEL_OUTOF10: 10

## 2025-02-28 ASSESSMENT — PAIN DESCRIPTION - LOCATION: LOCATION: BACK;LEG

## 2025-02-28 NOTE — ED PROVIDER NOTES
[facility] EMERGENCY DEPARTMENT  EMERGENCY DEPARTMENT ENCOUNTER        Pt Name: Javier Justin Sr.  MRN: [id number]  Birthdate 1950  Date of evaluation: 2/28/2025  Provider: Jesi Jimenez MD  PCP: Raúl Farias MD  Note Started: 5:46 PM EST 2/28/25    HPI  74 y.o. male presenting for back pain, anxiety.  Patient complains of low back pain has been present since 2013.  He complains of left-sided sciatica.  Complains of anxiety that started about an hour ago.  He took 2 Xanax at home without relief.  He endorses shortness of breath.  He denies chest pain, fevers, saddle esthesia, urinary incontinence or retention, or incontinence.      --------------------------------------------- PAST HISTORY ---------------------------------------------  Past Medical History:  has a past medical history of Anxiety, Back pain, Diabetes mellitus (HCC), Hyperlipidemia, Hypertension, and Sciatica.    Past Surgical History:  has a past surgical history that includes back surgery; knee surgery; other surgical history (5/8/2014); Carpal tunnel release; hip surgery (Right); Vena Cava Filter Placement (N/A, 11/7/2024); and invasive vascular (N/A, 1/16/2025).    Social History:  reports that he quit smoking about 11 years ago. His smoking use included cigarettes. He started smoking about 31 years ago. He has a 20 pack-year smoking history. He has never used smokeless tobacco. He reports that he does not drink alcohol and does not use drugs.    Family History: family history is not on file.     The patient’s home medications have been reviewed.    Allergies: Hydrocodone      Review of Systems   Constitutional:  Negative for chills and fever.   HENT:  Negative for congestion and sore throat.    Eyes:  Negative for photophobia and visual disturbance.   Respiratory:  Positive for shortness of breath. Negative for cough.    Cardiovascular:  Negative for chest pain and leg swelling.   Gastrointestinal:  Negative  for abdominal pain, diarrhea and nausea.   Genitourinary:  Negative for dysuria and flank pain.   Musculoskeletal:  Positive for back pain. Negative for myalgias.   Skin:  Negative for rash and wound.   Neurological:  Negative for dizziness, light-headedness and headaches.   Psychiatric/Behavioral:  The patient is nervous/anxious.         Physical Exam  Constitutional:       General: He is not in acute distress.     Appearance: Normal appearance.      Comments: Patient anxious appearing   HENT:      Head: Normocephalic and atraumatic.      Right Ear: External ear normal.      Left Ear: External ear normal.      Nose: Nose normal.   Eyes:      Conjunctiva/sclera: Conjunctivae normal.   Cardiovascular:      Rate and Rhythm: Regular rhythm. Tachycardia present.      Comments: DP pulse detectable with Doppler bilateral lower extremities, PT pulses palpable bilateral lower extremities  Pulmonary:      Effort: Pulmonary effort is normal. No respiratory distress.      Breath sounds: Normal breath sounds. No stridor. No wheezing, rhonchi or rales.   Abdominal:      General: There is no distension.      Palpations: Abdomen is soft.      Tenderness: There is no abdominal tenderness.   Musculoskeletal:         General: No swelling or deformity.   Skin:     General: Skin is warm and dry.   Neurological:      General: No focal deficit present.      Mental Status: He is alert.      Comments: Sensation to touch intact bilateral lower extremities, dorsiflexion and plantarflexion intact bilateral lower extremities   Psychiatric:         Mood and Affect: Mood normal.        Procedures     -------------------------------------------------- RESULTS -------------------------------------------------    LABS:  Results for orders placed or performed during the hospital encounter of 02/28/25   CBC with Auto Differential   Result Value Ref Range    WBC 9.2 4.5 - 11.5 k/uL    RBC 4.57 3.80 - 5.80 m/uL    Hemoglobin 11.8 (L) 12.5 - 16.5 g/dL

## 2025-03-04 ENCOUNTER — APPOINTMENT (OUTPATIENT)
Dept: GENERAL RADIOLOGY | Age: 75
End: 2025-03-04
Payer: MEDICARE

## 2025-03-04 ENCOUNTER — HOSPITAL ENCOUNTER (OUTPATIENT)
Age: 75
Setting detail: OBSERVATION
Discharge: LEFT BEFORE DISCHARGE | End: 2025-03-05
Attending: STUDENT IN AN ORGANIZED HEALTH CARE EDUCATION/TRAINING PROGRAM | Admitting: INTERNAL MEDICINE
Payer: MEDICARE

## 2025-03-04 ENCOUNTER — APPOINTMENT (OUTPATIENT)
Dept: CT IMAGING | Age: 75
End: 2025-03-04
Payer: MEDICARE

## 2025-03-04 ENCOUNTER — HOSPITAL ENCOUNTER (EMERGENCY)
Age: 75
Discharge: LEFT BEFORE DISCHARGE | End: 2025-03-04
Attending: EMERGENCY MEDICINE
Payer: MEDICARE

## 2025-03-04 VITALS
RESPIRATION RATE: 18 BRPM | SYSTOLIC BLOOD PRESSURE: 152 MMHG | OXYGEN SATURATION: 96 % | BODY MASS INDEX: 38.51 KG/M2 | TEMPERATURE: 98 F | DIASTOLIC BLOOD PRESSURE: 88 MMHG | HEART RATE: 95 BPM | WEIGHT: 260 LBS | HEIGHT: 69 IN

## 2025-03-04 VITALS
SYSTOLIC BLOOD PRESSURE: 181 MMHG | TEMPERATURE: 98.2 F | DIASTOLIC BLOOD PRESSURE: 96 MMHG | HEART RATE: 88 BPM | RESPIRATION RATE: 18 BRPM | OXYGEN SATURATION: 97 %

## 2025-03-04 DIAGNOSIS — R07.9 CHEST PAIN, UNSPECIFIED TYPE: Primary | ICD-10-CM

## 2025-03-04 DIAGNOSIS — E83.42 HYPOMAGNESEMIA: ICD-10-CM

## 2025-03-04 DIAGNOSIS — R09.1 PLEURISY: ICD-10-CM

## 2025-03-04 DIAGNOSIS — R42 DIZZINESS: ICD-10-CM

## 2025-03-04 LAB
ALBUMIN SERPL-MCNC: 3.9 G/DL (ref 3.5–5.2)
ALBUMIN SERPL-MCNC: 4.3 G/DL (ref 3.5–5.2)
ALP SERPL-CCNC: 133 U/L (ref 40–129)
ALP SERPL-CCNC: 146 U/L (ref 40–129)
ALT SERPL-CCNC: 25 U/L (ref 0–40)
ALT SERPL-CCNC: 26 U/L (ref 0–40)
AMPHET UR QL SCN: NEGATIVE
AMYLASE SERPL-CCNC: 89 U/L (ref 20–100)
ANION GAP SERPL CALCULATED.3IONS-SCNC: 13 MMOL/L (ref 7–16)
ANION GAP SERPL CALCULATED.3IONS-SCNC: 14 MMOL/L (ref 7–16)
APAP SERPL-MCNC: <5 UG/ML (ref 10–30)
AST SERPL-CCNC: 38 U/L (ref 0–39)
AST SERPL-CCNC: 45 U/L (ref 0–39)
BARBITURATES UR QL SCN: NEGATIVE
BASOPHILS # BLD: 0.05 K/UL (ref 0–0.2)
BASOPHILS NFR BLD: 1 % (ref 0–2)
BENZODIAZ UR QL: POSITIVE
BILIRUB DIRECT SERPL-MCNC: <0.2 MG/DL (ref 0–0.3)
BILIRUB INDIRECT SERPL-MCNC: ABNORMAL MG/DL (ref 0–1)
BILIRUB SERPL-MCNC: 0.8 MG/DL (ref 0–1.2)
BILIRUB SERPL-MCNC: 1 MG/DL (ref 0–1.2)
BILIRUB UR QL STRIP: NEGATIVE
BNP SERPL-MCNC: 47 PG/ML (ref 0–125)
BNP SERPL-MCNC: 81 PG/ML (ref 0–125)
BUN SERPL-MCNC: 16 MG/DL (ref 6–23)
BUN SERPL-MCNC: 18 MG/DL (ref 6–23)
BUPRENORPHINE UR QL: NEGATIVE
CALCIUM SERPL-MCNC: 10.2 MG/DL (ref 8.6–10.2)
CALCIUM SERPL-MCNC: 9.9 MG/DL (ref 8.6–10.2)
CANNABINOIDS UR QL SCN: NEGATIVE
CHLORIDE SERPL-SCNC: 97 MMOL/L (ref 98–107)
CHLORIDE SERPL-SCNC: 99 MMOL/L (ref 98–107)
CK SERPL-CCNC: 269 U/L (ref 20–200)
CLARITY UR: CLEAR
CO2 SERPL-SCNC: 25 MMOL/L (ref 22–29)
CO2 SERPL-SCNC: 28 MMOL/L (ref 22–29)
COCAINE UR QL SCN: NEGATIVE
COLOR UR: YELLOW
CREAT SERPL-MCNC: 1 MG/DL (ref 0.7–1.2)
CREAT SERPL-MCNC: 1.2 MG/DL (ref 0.7–1.2)
D-DIMER QUANTITATIVE: 1053 NG/ML DDU (ref 0–230)
EKG ATRIAL RATE: 77 BPM
EKG P AXIS: 44 DEGREES
EKG P-R INTERVAL: 164 MS
EKG Q-T INTERVAL: 414 MS
EKG QRS DURATION: 92 MS
EKG QTC CALCULATION (BAZETT): 468 MS
EKG R AXIS: -20 DEGREES
EKG T AXIS: 50 DEGREES
EKG VENTRICULAR RATE: 77 BPM
EOSINOPHIL # BLD: 0.16 K/UL (ref 0.05–0.5)
EOSINOPHILS RELATIVE PERCENT: 2 % (ref 0–6)
ERYTHROCYTE [DISTWIDTH] IN BLOOD BY AUTOMATED COUNT: 15.7 % (ref 11.5–15)
ERYTHROCYTE [DISTWIDTH] IN BLOOD BY AUTOMATED COUNT: 15.9 % (ref 11.5–15)
ETHANOLAMINE SERPL-MCNC: <10 MG/DL (ref 0–0.08)
FENTANYL UR QL: NEGATIVE
GFR, ESTIMATED: 67 ML/MIN/1.73M2
GFR, ESTIMATED: 76 ML/MIN/1.73M2
GLUCOSE SERPL-MCNC: 148 MG/DL (ref 74–99)
GLUCOSE SERPL-MCNC: 167 MG/DL (ref 74–99)
GLUCOSE UR STRIP-MCNC: NEGATIVE MG/DL
HCT VFR BLD AUTO: 35.6 % (ref 37–54)
HCT VFR BLD AUTO: 37.6 % (ref 37–54)
HGB BLD-MCNC: 11.4 G/DL (ref 12.5–16.5)
HGB BLD-MCNC: 12.2 G/DL (ref 12.5–16.5)
HGB UR QL STRIP.AUTO: NEGATIVE
IMM GRANULOCYTES # BLD AUTO: 0.03 K/UL (ref 0–0.58)
IMM GRANULOCYTES NFR BLD: 0 % (ref 0–5)
INR PPP: 1.3
KETONES UR STRIP-MCNC: NEGATIVE MG/DL
LACTATE BLDV-SCNC: 1.8 MMOL/L (ref 0.5–2.2)
LEUKOCYTE ESTERASE UR QL STRIP: NEGATIVE
LIPASE SERPL-CCNC: 52 U/L (ref 13–60)
LYMPHOCYTES NFR BLD: 2.02 K/UL (ref 1.5–4)
LYMPHOCYTES RELATIVE PERCENT: 25 % (ref 20–42)
MAGNESIUM SERPL-MCNC: 1.3 MG/DL (ref 1.6–2.6)
MCH RBC QN AUTO: 25.5 PG (ref 26–35)
MCH RBC QN AUTO: 26.1 PG (ref 26–35)
MCHC RBC AUTO-ENTMCNC: 32 G/DL (ref 32–34.5)
MCHC RBC AUTO-ENTMCNC: 32.4 G/DL (ref 32–34.5)
MCV RBC AUTO: 78.7 FL (ref 80–99.9)
MCV RBC AUTO: 81.7 FL (ref 80–99.9)
METHADONE UR QL: NEGATIVE
MONOCYTES NFR BLD: 0.86 K/UL (ref 0.1–0.95)
MONOCYTES NFR BLD: 11 % (ref 2–12)
NEUTROPHILS NFR BLD: 62 % (ref 43–80)
NEUTS SEG NFR BLD: 5 K/UL (ref 1.8–7.3)
NITRITE UR QL STRIP: NEGATIVE
OPIATES UR QL SCN: NEGATIVE
OXYCODONE UR QL SCN: POSITIVE
PCP UR QL SCN: NEGATIVE
PH UR STRIP: 7 [PH] (ref 5–8)
PLATELET # BLD AUTO: 227 K/UL (ref 130–450)
PLATELET # BLD AUTO: 227 K/UL (ref 130–450)
PMV BLD AUTO: 10.9 FL (ref 7–12)
PMV BLD AUTO: 11.2 FL (ref 7–12)
POTASSIUM SERPL-SCNC: 3.7 MMOL/L (ref 3.5–5)
POTASSIUM SERPL-SCNC: 4.2 MMOL/L (ref 3.5–5)
PROT SERPL-MCNC: 7.6 G/DL (ref 6.4–8.3)
PROT SERPL-MCNC: 8.1 G/DL (ref 6.4–8.3)
PROT UR STRIP-MCNC: NEGATIVE MG/DL
PROTHROMBIN TIME: 14.1 SEC (ref 9.3–12.4)
RBC # BLD AUTO: 4.36 M/UL (ref 3.8–5.8)
RBC # BLD AUTO: 4.78 M/UL (ref 3.8–5.8)
RBC #/AREA URNS HPF: NORMAL /HPF
SALICYLATES SERPL-MCNC: <0.3 MG/DL (ref 0–30)
SODIUM SERPL-SCNC: 138 MMOL/L (ref 132–146)
SODIUM SERPL-SCNC: 138 MMOL/L (ref 132–146)
SP GR UR STRIP: 1.01 (ref 1–1.03)
TEST INFORMATION: ABNORMAL
TOXIC TRICYCLIC SC,BLOOD: NEGATIVE
TROPONIN I SERPL HS-MCNC: 26 NG/L (ref 0–11)
TROPONIN I SERPL HS-MCNC: 27 NG/L (ref 0–11)
TROPONIN I SERPL HS-MCNC: 28 NG/L (ref 0–11)
TROPONIN I SERPL HS-MCNC: 29 NG/L (ref 0–11)
UROBILINOGEN UR STRIP-ACNC: 0.2 EU/DL (ref 0–1)
WBC #/AREA URNS HPF: NORMAL /HPF
WBC OTHER # BLD: 8.1 K/UL (ref 4.5–11.5)
WBC OTHER # BLD: 9.4 K/UL (ref 4.5–11.5)

## 2025-03-04 PROCEDURE — 83880 ASSAY OF NATRIURETIC PEPTIDE: CPT

## 2025-03-04 PROCEDURE — 81001 URINALYSIS AUTO W/SCOPE: CPT

## 2025-03-04 PROCEDURE — 6360000004 HC RX CONTRAST MEDICATION: Performed by: RADIOLOGY

## 2025-03-04 PROCEDURE — 80053 COMPREHEN METABOLIC PANEL: CPT

## 2025-03-04 PROCEDURE — 85379 FIBRIN DEGRADATION QUANT: CPT

## 2025-03-04 PROCEDURE — G0480 DRUG TEST DEF 1-7 CLASSES: HCPCS

## 2025-03-04 PROCEDURE — 83735 ASSAY OF MAGNESIUM: CPT

## 2025-03-04 PROCEDURE — 99285 EMERGENCY DEPT VISIT HI MDM: CPT

## 2025-03-04 PROCEDURE — 83690 ASSAY OF LIPASE: CPT

## 2025-03-04 PROCEDURE — 80307 DRUG TEST PRSMV CHEM ANLYZR: CPT

## 2025-03-04 PROCEDURE — 6370000000 HC RX 637 (ALT 250 FOR IP): Performed by: EMERGENCY MEDICINE

## 2025-03-04 PROCEDURE — 93010 ELECTROCARDIOGRAM REPORT: CPT | Performed by: INTERNAL MEDICINE

## 2025-03-04 PROCEDURE — 71045 X-RAY EXAM CHEST 1 VIEW: CPT

## 2025-03-04 PROCEDURE — 84484 ASSAY OF TROPONIN QUANT: CPT

## 2025-03-04 PROCEDURE — 85025 COMPLETE CBC W/AUTO DIFF WBC: CPT

## 2025-03-04 PROCEDURE — 70450 CT HEAD/BRAIN W/O DYE: CPT

## 2025-03-04 PROCEDURE — 93005 ELECTROCARDIOGRAM TRACING: CPT | Performed by: PHYSICIAN ASSISTANT

## 2025-03-04 PROCEDURE — 6360000002 HC RX W HCPCS: Performed by: EMERGENCY MEDICINE

## 2025-03-04 PROCEDURE — 2580000003 HC RX 258: Performed by: EMERGENCY MEDICINE

## 2025-03-04 PROCEDURE — 80143 DRUG ASSAY ACETAMINOPHEN: CPT

## 2025-03-04 PROCEDURE — 85027 COMPLETE CBC AUTOMATED: CPT

## 2025-03-04 PROCEDURE — 83605 ASSAY OF LACTIC ACID: CPT

## 2025-03-04 PROCEDURE — 71275 CT ANGIOGRAPHY CHEST: CPT

## 2025-03-04 PROCEDURE — 82150 ASSAY OF AMYLASE: CPT

## 2025-03-04 PROCEDURE — 96365 THER/PROPH/DIAG IV INF INIT: CPT

## 2025-03-04 PROCEDURE — 82248 BILIRUBIN DIRECT: CPT

## 2025-03-04 PROCEDURE — 82550 ASSAY OF CK (CPK): CPT

## 2025-03-04 PROCEDURE — 85610 PROTHROMBIN TIME: CPT

## 2025-03-04 PROCEDURE — 93005 ELECTROCARDIOGRAM TRACING: CPT | Performed by: EMERGENCY MEDICINE

## 2025-03-04 PROCEDURE — 80179 DRUG ASSAY SALICYLATE: CPT

## 2025-03-04 PROCEDURE — 96366 THER/PROPH/DIAG IV INF ADDON: CPT

## 2025-03-04 RX ORDER — 0.9 % SODIUM CHLORIDE 0.9 %
500 INTRAVENOUS SOLUTION INTRAVENOUS ONCE
Status: COMPLETED | OUTPATIENT
Start: 2025-03-04 | End: 2025-03-04

## 2025-03-04 RX ORDER — CLONIDINE HYDROCHLORIDE 0.1 MG/1
0.2 TABLET ORAL ONCE
Status: COMPLETED | OUTPATIENT
Start: 2025-03-04 | End: 2025-03-04

## 2025-03-04 RX ORDER — MECLIZINE HCL 12.5 MG 12.5 MG/1
25 TABLET ORAL ONCE
Status: COMPLETED | OUTPATIENT
Start: 2025-03-04 | End: 2025-03-04

## 2025-03-04 RX ORDER — MAGNESIUM SULFATE IN WATER 40 MG/ML
2000 INJECTION, SOLUTION INTRAVENOUS PRN
Status: DISCONTINUED | OUTPATIENT
Start: 2025-03-04 | End: 2025-03-05 | Stop reason: HOSPADM

## 2025-03-04 RX ORDER — SODIUM CHLORIDE 0.9 % (FLUSH) 0.9 %
10 SYRINGE (ML) INJECTION PRN
Status: DISCONTINUED | OUTPATIENT
Start: 2025-03-04 | End: 2025-03-05 | Stop reason: HOSPADM

## 2025-03-04 RX ORDER — POTASSIUM CHLORIDE 7.45 MG/ML
10 INJECTION INTRAVENOUS PRN
Status: DISCONTINUED | OUTPATIENT
Start: 2025-03-04 | End: 2025-03-05 | Stop reason: HOSPADM

## 2025-03-04 RX ORDER — MAGNESIUM SULFATE IN WATER 40 MG/ML
2000 INJECTION, SOLUTION INTRAVENOUS ONCE
Status: COMPLETED | OUTPATIENT
Start: 2025-03-04 | End: 2025-03-04

## 2025-03-04 RX ORDER — METOPROLOL TARTRATE 50 MG
25 TABLET ORAL ONCE
Status: COMPLETED | OUTPATIENT
Start: 2025-03-04 | End: 2025-03-04

## 2025-03-04 RX ORDER — POTASSIUM CHLORIDE 1500 MG/1
40 TABLET, EXTENDED RELEASE ORAL PRN
Status: DISCONTINUED | OUTPATIENT
Start: 2025-03-04 | End: 2025-03-05 | Stop reason: HOSPADM

## 2025-03-04 RX ORDER — IOPAMIDOL 755 MG/ML
75 INJECTION, SOLUTION INTRAVASCULAR
Status: COMPLETED | OUTPATIENT
Start: 2025-03-04 | End: 2025-03-04

## 2025-03-04 RX ORDER — ASPIRIN 81 MG/1
324 TABLET, CHEWABLE ORAL ONCE
Status: COMPLETED | OUTPATIENT
Start: 2025-03-04 | End: 2025-03-04

## 2025-03-04 RX ORDER — ONDANSETRON 4 MG/1
4 TABLET, ORALLY DISINTEGRATING ORAL EVERY 8 HOURS PRN
Status: DISCONTINUED | OUTPATIENT
Start: 2025-03-04 | End: 2025-03-05 | Stop reason: HOSPADM

## 2025-03-04 RX ORDER — SODIUM CHLORIDE 0.9 % (FLUSH) 0.9 %
10 SYRINGE (ML) INJECTION EVERY 12 HOURS SCHEDULED
Status: DISCONTINUED | OUTPATIENT
Start: 2025-03-05 | End: 2025-03-05 | Stop reason: HOSPADM

## 2025-03-04 RX ORDER — ACETAMINOPHEN 650 MG/1
650 SUPPOSITORY RECTAL EVERY 6 HOURS PRN
Status: DISCONTINUED | OUTPATIENT
Start: 2025-03-04 | End: 2025-03-05 | Stop reason: HOSPADM

## 2025-03-04 RX ORDER — ENOXAPARIN SODIUM 100 MG/ML
30 INJECTION SUBCUTANEOUS 2 TIMES DAILY
Status: DISCONTINUED | OUTPATIENT
Start: 2025-03-05 | End: 2025-03-05 | Stop reason: HOSPADM

## 2025-03-04 RX ORDER — ONDANSETRON 2 MG/ML
4 INJECTION INTRAMUSCULAR; INTRAVENOUS EVERY 6 HOURS PRN
Status: DISCONTINUED | OUTPATIENT
Start: 2025-03-04 | End: 2025-03-05 | Stop reason: HOSPADM

## 2025-03-04 RX ORDER — ACETAMINOPHEN 325 MG/1
650 TABLET ORAL EVERY 6 HOURS PRN
Status: DISCONTINUED | OUTPATIENT
Start: 2025-03-04 | End: 2025-03-05 | Stop reason: HOSPADM

## 2025-03-04 RX ORDER — OXYCODONE AND ACETAMINOPHEN 5; 325 MG/1; MG/1
1 TABLET ORAL ONCE
Status: COMPLETED | OUTPATIENT
Start: 2025-03-04 | End: 2025-03-04

## 2025-03-04 RX ORDER — NITROGLYCERIN 0.4 MG/1
0.4 TABLET SUBLINGUAL ONCE
Status: DISCONTINUED | OUTPATIENT
Start: 2025-03-05 | End: 2025-03-05 | Stop reason: HOSPADM

## 2025-03-04 RX ORDER — SENNOSIDES A AND B 8.6 MG/1
1 TABLET, FILM COATED ORAL DAILY PRN
Status: DISCONTINUED | OUTPATIENT
Start: 2025-03-04 | End: 2025-03-05 | Stop reason: HOSPADM

## 2025-03-04 RX ORDER — SODIUM CHLORIDE 9 MG/ML
INJECTION, SOLUTION INTRAVENOUS PRN
Status: DISCONTINUED | OUTPATIENT
Start: 2025-03-04 | End: 2025-03-05 | Stop reason: HOSPADM

## 2025-03-04 RX ADMIN — METOPROLOL TARTRATE 25 MG: 50 TABLET, FILM COATED ORAL at 12:30

## 2025-03-04 RX ADMIN — MECLIZINE 25 MG: 12.5 TABLET ORAL at 05:57

## 2025-03-04 RX ADMIN — SODIUM CHLORIDE 500 ML: 0.9 INJECTION, SOLUTION INTRAVENOUS at 05:59

## 2025-03-04 RX ADMIN — CLONIDINE HYDROCHLORIDE 0.2 MG: 0.1 TABLET ORAL at 12:31

## 2025-03-04 RX ADMIN — ASPIRIN 81 MG CHEWABLE TABLET 324 MG: 81 TABLET CHEWABLE at 05:57

## 2025-03-04 RX ADMIN — IOPAMIDOL 75 ML: 755 INJECTION, SOLUTION INTRAVENOUS at 06:13

## 2025-03-04 RX ADMIN — OXYCODONE HYDROCHLORIDE AND ACETAMINOPHEN 1 TABLET: 5; 325 TABLET ORAL at 15:10

## 2025-03-04 RX ADMIN — MAGNESIUM SULFATE HEPTAHYDRATE 2000 MG: 40 INJECTION, SOLUTION INTRAVENOUS at 07:48

## 2025-03-04 ASSESSMENT — PAIN SCALES - GENERAL
PAINLEVEL_OUTOF10: 10
PAINLEVEL_OUTOF10: 8
PAINLEVEL_OUTOF10: 7

## 2025-03-04 ASSESSMENT — LIFESTYLE VARIABLES
HOW MANY STANDARD DRINKS CONTAINING ALCOHOL DO YOU HAVE ON A TYPICAL DAY: PATIENT DOES NOT DRINK
HOW OFTEN DO YOU HAVE A DRINK CONTAINING ALCOHOL: NEVER
HOW OFTEN DO YOU HAVE A DRINK CONTAINING ALCOHOL: NEVER
HOW MANY STANDARD DRINKS CONTAINING ALCOHOL DO YOU HAVE ON A TYPICAL DAY: PATIENT DOES NOT DRINK

## 2025-03-04 ASSESSMENT — PAIN - FUNCTIONAL ASSESSMENT: PAIN_FUNCTIONAL_ASSESSMENT: ACTIVITIES ARE NOT PREVENTED

## 2025-03-04 ASSESSMENT — PAIN DESCRIPTION - LOCATION
LOCATION: BACK;LEG
LOCATION: CHEST
LOCATION: CHEST

## 2025-03-04 ASSESSMENT — PAIN DESCRIPTION - DESCRIPTORS: DESCRIPTORS: DISCOMFORT;ACHING;SHARP;SHOOTING

## 2025-03-04 ASSESSMENT — PAIN DESCRIPTION - ORIENTATION: ORIENTATION: RIGHT;LEFT

## 2025-03-04 ASSESSMENT — PAIN DESCRIPTION - PAIN TYPE: TYPE: ACUTE PAIN

## 2025-03-04 NOTE — ED PROVIDER NOTES
severity, and worsened by nothing.  He has had a stress test but never have a catheterization he does have a positive family history for heart disease he was also nauseated and short of breath with it as well as he was somewhat diaphoretic to he did no tinnitus or ringing in his ear is no neurological deficits his NIH score is a 0 he does have multiple risk factors for heart disease he is not a smoker he is a smoker since high school we will get a CTA of his chest please note patient's care will be turned over to the oncoming physician    Re-Evaluations:             Re-evaluation.  Patient’s symptoms are improving      Consultations:                 Critical Care:         This patient's ED course included: a personal history and physicial examination, re-evaluation prior to disposition, multiple bedside re-evaluations, IV medications, cardiac monitoring, continuous pulse oximetry, complex medical decision making and emergency management, and a personal history and physicial eaxmination    This patient has remained hemodynamically stable during their ED course.    Counseling:   The emergency provider has spoken with the patient and discussed today’s results, in addition to providing specific details for the plan of care and counseling regarding the diagnosis and prognosis.  Questions are answered at this time and they are agreeable with the plan.       --------------------------------- IMPRESSION AND DISPOSITION ---------------------------------    IMPRESSION  1. Chest pain, unspecified type    2. Pleurisy    3. Dizziness        DISPOSITION  Disposition: Transfer to  Hospital  Patient condition is stable        NOTE: This report was transcribed using voice recognition software. Every effort was made to ensure accuracy; however, inadvertent computerized transcription errors may be present          Teddy Gilbert MD  03/04/25 0604       Teddy Gilbert MD  03/04/25 0608       Teddy Gilbert,

## 2025-03-04 NOTE — ED NOTES
Pt signin out AMA< wife to take home    This patient has chosen to leave against medical advice.  I have personally explained to them that choosing to do so may result in permanent bodily harm or death.  I discussed at length that without further evaluation and monitoring there may be unforeseen circumstances and deterioration resulting in permanent bodily harm or death as a result of their choice.    They are alert, oriented, and competent at this time.  They state that they are aware of the serious risks as explained, but they continue to wish to leave against medical advice.    In light of their decision to leave against medical advice, follow-up has been instructed and they are aware of the importance of following up as instructed.  They have been advised that they should return to the ED immediately if they change their mind at any time, or if their condition begins to change or worsen.          Omer Carrera, DO  03/04/25 1624

## 2025-03-04 NOTE — ED NOTES
Pt signed out to me. CTA shows no PE, trop flat, ECG no stemi, pt was given asa. Heart score is moderate risk, pt endorses that it felt like \"a brick laying on my chest\".  Pt wants admitted at [facility], d/w dr nelson hospitalist who will admit    1. Chest pain, unspecified type    2. Pleurisy    3. Dizziness    4. Hypomagnesemia      Admit at [facility]     Omer Carrera DO  03/04/25 0799

## 2025-03-04 NOTE — ED NOTES
Patient now states after I admitted him that he talked with one of his doctors at [facility] on the phone and he changed his mind and is leaving AGAINST MEDICAL ADVICE and he will go see his doctor.  He verbalized understanding that he could be at risk for severe morbidity paralysis or death    This patient has chosen to leave against medical advice.  I have personally explained to them that choosing to do so may result in permanent bodily harm or death.  I discussed at length that without further evaluation and monitoring there may be unforeseen circumstances and deterioration resulting in permanent bodily harm or death as a result of their choice.    They are alert, oriented, and competent at this time.  They state that they are aware of the serious risks as explained, but they continue to wish to leave against medical advice.    In light of their decision to leave against medical advice, follow-up has been instructed and they are aware of the importance of following up as instructed.  They have been advised that they should return to the ED immediately if they change their mind at any time, or if their condition begins to change or worsen.          Omer Carrera,   03/04/25 0905

## 2025-03-05 ENCOUNTER — HOSPITAL ENCOUNTER (OUTPATIENT)
Age: 75
Setting detail: OBSERVATION
Discharge: HOME | End: 2025-03-08
Attending: EMERGENCY MEDICINE | Admitting: HOSPITALIST
Payer: MEDICARE

## 2025-03-05 ENCOUNTER — APPOINTMENT (OUTPATIENT)
Dept: GENERAL RADIOLOGY | Age: 75
End: 2025-03-05
Payer: MEDICARE

## 2025-03-05 ENCOUNTER — APPOINTMENT (OUTPATIENT)
Dept: CT IMAGING | Age: 75
End: 2025-03-05
Payer: MEDICARE

## 2025-03-05 DIAGNOSIS — I20.0 UNSTABLE ANGINA (HCC): ICD-10-CM

## 2025-03-05 DIAGNOSIS — R07.9 CHEST PAIN, UNSPECIFIED TYPE: Primary | ICD-10-CM

## 2025-03-05 DIAGNOSIS — I20.9 ANGINA PECTORIS: ICD-10-CM

## 2025-03-05 PROBLEM — E66.812 CLASS 2 SEVERE OBESITY WITH SERIOUS COMORBIDITY AND BODY MASS INDEX (BMI) OF 38.0 TO 38.9 IN ADULT: Status: ACTIVE | Noted: 2024-05-28

## 2025-03-05 LAB
ALBUMIN SERPL-MCNC: 4.3 G/DL (ref 3.5–5.2)
ALP SERPL-CCNC: 131 U/L (ref 40–129)
ALT SERPL-CCNC: 28 U/L (ref 0–40)
ANION GAP SERPL CALCULATED.3IONS-SCNC: 15 MMOL/L (ref 7–16)
AST SERPL-CCNC: 54 U/L (ref 0–39)
BASOPHILS # BLD: 0.06 K/UL (ref 0–0.2)
BASOPHILS NFR BLD: 1 % (ref 0–2)
BILIRUB SERPL-MCNC: 0.8 MG/DL (ref 0–1.2)
BNP SERPL-MCNC: 70 PG/ML (ref 0–125)
BUN SERPL-MCNC: 15 MG/DL (ref 6–23)
CALCIUM SERPL-MCNC: 9.5 MG/DL (ref 8.6–10.2)
CHLORIDE SERPL-SCNC: 101 MMOL/L (ref 98–107)
CO2 SERPL-SCNC: 21 MMOL/L (ref 22–29)
CREAT SERPL-MCNC: 1 MG/DL (ref 0.7–1.2)
EKG ATRIAL RATE: 93 BPM
EKG P AXIS: 51 DEGREES
EKG P-R INTERVAL: 238 MS
EKG Q-T INTERVAL: 382 MS
EKG QRS DURATION: 88 MS
EKG QTC CALCULATION (BAZETT): 474 MS
EKG R AXIS: -22 DEGREES
EKG T AXIS: 43 DEGREES
EKG VENTRICULAR RATE: 93 BPM
EOSINOPHIL # BLD: 0.12 K/UL (ref 0.05–0.5)
EOSINOPHILS RELATIVE PERCENT: 2 % (ref 0–6)
ERYTHROCYTE [DISTWIDTH] IN BLOOD BY AUTOMATED COUNT: 16.1 % (ref 11.5–15)
GFR, ESTIMATED: 82 ML/MIN/1.73M2
GLUCOSE SERPL-MCNC: 154 MG/DL (ref 74–99)
HCT VFR BLD AUTO: 34.4 % (ref 37–54)
HGB BLD-MCNC: 11.1 G/DL (ref 12.5–16.5)
IMM GRANULOCYTES # BLD AUTO: 0.03 K/UL (ref 0–0.58)
IMM GRANULOCYTES NFR BLD: 0 % (ref 0–5)
LYMPHOCYTES NFR BLD: 1.58 K/UL (ref 1.5–4)
LYMPHOCYTES RELATIVE PERCENT: 21 % (ref 20–42)
MCH RBC QN AUTO: 25.8 PG (ref 26–35)
MCHC RBC AUTO-ENTMCNC: 32.3 G/DL (ref 32–34.5)
MCV RBC AUTO: 79.8 FL (ref 80–99.9)
MONOCYTES NFR BLD: 0.95 K/UL (ref 0.1–0.95)
MONOCYTES NFR BLD: 13 % (ref 2–12)
NEUTROPHILS NFR BLD: 63 % (ref 43–80)
NEUTS SEG NFR BLD: 4.72 K/UL (ref 1.8–7.3)
PLATELET # BLD AUTO: 221 K/UL (ref 130–450)
PMV BLD AUTO: 11.1 FL (ref 7–12)
POTASSIUM SERPL-SCNC: 4 MMOL/L (ref 3.5–5)
PROT SERPL-MCNC: 7.4 G/DL (ref 6.4–8.3)
RBC # BLD AUTO: 4.31 M/UL (ref 3.8–5.8)
SODIUM SERPL-SCNC: 137 MMOL/L (ref 132–146)
TROPONIN I SERPL HS-MCNC: 27 NG/L (ref 0–11)
TROPONIN I SERPL HS-MCNC: 29 NG/L (ref 0–11)
WBC OTHER # BLD: 7.5 K/UL (ref 4.5–11.5)

## 2025-03-05 PROCEDURE — 93005 ELECTROCARDIOGRAM TRACING: CPT | Performed by: EMERGENCY MEDICINE

## 2025-03-05 PROCEDURE — 84484 ASSAY OF TROPONIN QUANT: CPT

## 2025-03-05 PROCEDURE — 99285 EMERGENCY DEPT VISIT HI MDM: CPT

## 2025-03-05 PROCEDURE — 85025 COMPLETE CBC W/AUTO DIFF WBC: CPT

## 2025-03-05 PROCEDURE — 70450 CT HEAD/BRAIN W/O DYE: CPT

## 2025-03-05 PROCEDURE — G0378 HOSPITAL OBSERVATION PER HR: HCPCS

## 2025-03-05 PROCEDURE — 80053 COMPREHEN METABOLIC PANEL: CPT

## 2025-03-05 PROCEDURE — 6360000004 HC RX CONTRAST MEDICATION: Performed by: RADIOLOGY

## 2025-03-05 PROCEDURE — 71275 CT ANGIOGRAPHY CHEST: CPT

## 2025-03-05 PROCEDURE — 96374 THER/PROPH/DIAG INJ IV PUSH: CPT

## 2025-03-05 PROCEDURE — 93010 ELECTROCARDIOGRAM REPORT: CPT | Performed by: INTERNAL MEDICINE

## 2025-03-05 PROCEDURE — 83880 ASSAY OF NATRIURETIC PEPTIDE: CPT

## 2025-03-05 PROCEDURE — 99223 1ST HOSP IP/OBS HIGH 75: CPT | Performed by: HOSPITALIST

## 2025-03-05 PROCEDURE — 96375 TX/PRO/DX INJ NEW DRUG ADDON: CPT

## 2025-03-05 PROCEDURE — 71045 X-RAY EXAM CHEST 1 VIEW: CPT

## 2025-03-05 PROCEDURE — 6360000002 HC RX W HCPCS: Performed by: EMERGENCY MEDICINE

## 2025-03-05 RX ORDER — FENTANYL CITRATE 50 UG/ML
50 INJECTION, SOLUTION INTRAMUSCULAR; INTRAVENOUS ONCE
Status: COMPLETED | OUTPATIENT
Start: 2025-03-05 | End: 2025-03-05

## 2025-03-05 RX ORDER — IOPAMIDOL 755 MG/ML
75 INJECTION, SOLUTION INTRAVASCULAR
Status: COMPLETED | OUTPATIENT
Start: 2025-03-05 | End: 2025-03-05

## 2025-03-05 RX ADMIN — FENTANYL CITRATE 50 MCG: 50 INJECTION INTRAMUSCULAR; INTRAVENOUS at 20:38

## 2025-03-05 RX ADMIN — IOPAMIDOL 75 ML: 755 INJECTION, SOLUTION INTRAVENOUS at 20:15

## 2025-03-05 ASSESSMENT — PAIN DESCRIPTION - ORIENTATION: ORIENTATION: RIGHT;LEFT

## 2025-03-05 ASSESSMENT — PAIN - FUNCTIONAL ASSESSMENT: PAIN_FUNCTIONAL_ASSESSMENT: NONE - DENIES PAIN

## 2025-03-05 ASSESSMENT — PAIN DESCRIPTION - LOCATION: LOCATION: LEG;BACK

## 2025-03-05 ASSESSMENT — PAIN DESCRIPTION - DESCRIPTORS: DESCRIPTORS: ACHING;THROBBING

## 2025-03-05 ASSESSMENT — PAIN SCALES - GENERAL: PAINLEVEL_OUTOF10: 10

## 2025-03-05 NOTE — CARE COORDINATION
Internal Medicine On-call Care Coordination Note    I was called by the ED physician because they recommended admission for this patient and I cover their PCP.  The history as I understand it after discussion with the ED physician is as follows:    Presents with chest pain since last night  Seen at [place] and admitted, however left AMA as it was talking too long for transfer  In ED here troponin unchanged, EKG without changes  Decision made for admission    I placed admission orders.  Including:    Telemetry  Cardiology consult    Dr. Rinaldi or his coverage will see the patient tomorrow for H&P.    Electronically signed by Eugenio Combs PA-C on 3/4/2025 at 11:54 PM

## 2025-03-05 NOTE — ED NOTES
Department of Emergency Medicine  FIRST PROVIDER TRIAGE NOTE             Independent MLP           3/5/25  6:34 PM EST    Date of Encounter: 3/5/25   MRN: [id number]      HPI: Javier Justin Sr. is a 74 y.o. male who presents to the ED for cough, congestion, elevated bp    ROS: Negative for urinary complaints or rash.    PE: Gen Appearance/Constitutional: alert     Initial Plan of Care: All treatment areas with department are currently occupied. Plan to order/Initiate the following while awaiting opening in ED: pending exam.  Initiate Treatment-Testing, Proceed toTreatment Area When Bed Available for ED Attending/MLP to Continue Care    Electronically signed by SHIRA Roberts CNP   DD: 3/5/25       Gilda Sutherland APRN - CNP  03/05/25 [id number]

## 2025-03-05 NOTE — ED PROVIDER NOTES
Department of Emergency Medicine   ED Provider Note  Admit Date/RoomTime: 3/4/2025  8:57 PM  ED Room: WAITING RESULTS/WAITING *          History of Present Illness:  3/4/25, Time: 9:22 PM EST      Patient is a 74 y.o. male presents with a chief complaint of chest pain  This has been occurring for today.  Patient states that it gets better with nothing.  Patient states that it gets worse with nothing.  Patient states that it is severe in severity.  Patient states it was acute in onset.  He notes he started having chest pain around 3 AM today that has been constant since.  Reports it feels like a brick on his chest.  No shortness of breath.  Pain does not radiate.  Notes he took oxycodone earlier and Ativan and has not eaten or drank today.  No abdominal pain, nausea or vomiting, fevers or chills, cough or congestion.  States he went to [facility] earlier today and they had plan to admit him however he did not want to wait for transportation and thus left AGAINST MEDICAL ADVICE.    Review of Systems:     Pertinent positives and negatives are stated within HPI.      --------------------------------------------- PAST HISTORY ---------------------------------------------  Past Medical History:  has a past medical history of Anxiety, Back pain, Diabetes mellitus (HCC), Hyperlipidemia, Hypertension, and Sciatica.    Past Surgical History:  has a past surgical history that includes back surgery; knee surgery; other surgical history (5/8/2014); Carpal tunnel release; hip surgery (Right); Vena Cava Filter Placement (N/A, 11/7/2024); and invasive vascular (N/A, 1/16/2025).    Social History:  reports that he quit smoking about 11 years ago. His smoking use included cigarettes. He started smoking about 31 years ago. He has a 20 pack-year smoking history. He has never used smokeless tobacco. He reports that he does not drink alcohol and does not use drugs.    Family History: family history is not on file.     The patient’s

## 2025-03-05 NOTE — ED NOTES
Patient unable to be found. Attempts were made to locate patient. Bathroom and waiting room were checked with patient not found.

## 2025-03-06 ENCOUNTER — APPOINTMENT (OUTPATIENT)
Dept: GENERAL RADIOLOGY | Age: 75
End: 2025-03-06
Payer: MEDICARE

## 2025-03-06 ENCOUNTER — APPOINTMENT (OUTPATIENT)
Age: 75
End: 2025-03-06
Attending: HOSPITALIST
Payer: MEDICARE

## 2025-03-06 ENCOUNTER — APPOINTMENT (OUTPATIENT)
Dept: ULTRASOUND IMAGING | Age: 75
End: 2025-03-06
Payer: MEDICARE

## 2025-03-06 PROBLEM — D50.9 MICROCYTIC ANEMIA: Status: ACTIVE | Noted: 2025-03-06

## 2025-03-06 PROBLEM — K21.00 GASTROESOPHAGEAL REFLUX DISEASE WITH ESOPHAGITIS WITHOUT HEMORRHAGE: Status: ACTIVE | Noted: 2025-03-06

## 2025-03-06 PROBLEM — I77.810 ASCENDING AORTA DILATATION: Status: ACTIVE | Noted: 2025-03-06

## 2025-03-06 LAB
ANION GAP SERPL CALCULATED.3IONS-SCNC: 15 MMOL/L (ref 7–16)
BUN SERPL-MCNC: 12 MG/DL (ref 6–23)
CALCIUM SERPL-MCNC: 9.6 MG/DL (ref 8.6–10.2)
CHLORIDE SERPL-SCNC: 101 MMOL/L (ref 98–107)
CHOLEST SERPL-MCNC: 192 MG/DL
CO2 SERPL-SCNC: 22 MMOL/L (ref 22–29)
CREAT SERPL-MCNC: 1 MG/DL (ref 0.7–1.2)
ECHO AO ASC DIAM: 3.7 CM
ECHO AO ASCENDING AORTA INDEX: 1.58 CM/M2
ECHO AO SINUS VALSALVA DIAM: 3.8 CM
ECHO AO SINUS VALSALVA INDEX: 1.62 CM/M2
ECHO AV AREA PEAK VELOCITY: 2.8 CM2
ECHO AV AREA VTI: 3.6 CM2
ECHO AV AREA/BSA PEAK VELOCITY: 1.2 CM2/M2
ECHO AV AREA/BSA VTI: 1.5 CM2/M2
ECHO AV CUSP MM: 2.5 CM
ECHO AV MEAN GRADIENT: 10 MMHG
ECHO AV MEAN VELOCITY: 1.5 M/S
ECHO AV PEAK GRADIENT: 15 MMHG
ECHO AV PEAK VELOCITY: 2 M/S
ECHO AV VELOCITY RATIO: 0.85
ECHO AV VTI: 33.6 CM
ECHO BSA: 2.43 M2
ECHO EST RA PRESSURE: 3 MMHG
ECHO LA DIAMETER INDEX: 1.79 CM/M2
ECHO LA DIAMETER: 4.2 CM
ECHO LA VOL A-L A2C: 67 ML (ref 18–58)
ECHO LA VOL A-L A4C: 63 ML (ref 18–58)
ECHO LA VOL BP: 64 ML (ref 18–58)
ECHO LA VOL MOD A2C: 65 ML (ref 18–58)
ECHO LA VOL MOD A4C: 57 ML (ref 18–58)
ECHO LA VOL/BSA BIPLANE: 27 ML/M2 (ref 16–34)
ECHO LA VOLUME AREA LENGTH: 68 ML
ECHO LA VOLUME INDEX A-L A2C: 29 ML/M2 (ref 16–34)
ECHO LA VOLUME INDEX A-L A4C: 27 ML/M2 (ref 16–34)
ECHO LA VOLUME INDEX AREA LENGTH: 29 ML/M2 (ref 16–34)
ECHO LA VOLUME INDEX MOD A2C: 28 ML/M2 (ref 16–34)
ECHO LA VOLUME INDEX MOD A4C: 24 ML/M2 (ref 16–34)
ECHO LV EF PHYSICIAN: 64 %
ECHO LV FRACTIONAL SHORTENING: 40 % (ref 28–44)
ECHO LV INTERNAL DIMENSION DIASTOLE INDEX: 2.05 CM/M2
ECHO LV INTERNAL DIMENSION DIASTOLIC: 4.8 CM (ref 4.2–5.9)
ECHO LV INTERNAL DIMENSION SYSTOLIC INDEX: 1.24 CM/M2
ECHO LV INTERNAL DIMENSION SYSTOLIC: 2.9 CM
ECHO LV IVSD: 1.3 CM (ref 0.6–1)
ECHO LV IVSS: 1.6 CM
ECHO LV MASS 2D: 194 G (ref 88–224)
ECHO LV MASS INDEX 2D: 82.9 G/M2 (ref 49–115)
ECHO LV POSTERIOR WALL DIASTOLIC: 0.9 CM (ref 0.6–1)
ECHO LV POSTERIOR WALL SYSTOLIC: 1.1 CM
ECHO LV RELATIVE WALL THICKNESS RATIO: 0.38
ECHO LVOT AREA: 3.1 CM2
ECHO LVOT AV VTI INDEX: 1.13
ECHO LVOT DIAM: 2 CM
ECHO LVOT MEAN GRADIENT: 8 MMHG
ECHO LVOT PEAK GRADIENT: 12 MMHG
ECHO LVOT PEAK VELOCITY: 1.7 M/S
ECHO LVOT STROKE VOLUME INDEX: 50.9 ML/M2
ECHO LVOT SV: 119 ML
ECHO LVOT VTI: 37.9 CM
ECHO MV "A" WAVE DURATION: 148.4 MSEC
ECHO MV A VELOCITY: 1.1 M/S
ECHO MV AREA PHT: 3.3 CM2
ECHO MV AREA VTI: 3.9 CM2
ECHO MV E DECELERATION TIME (DT): 249.9 MS
ECHO MV E VELOCITY: 0.91 M/S
ECHO MV E/A RATIO: 0.83
ECHO MV LVOT VTI INDEX: 0.8
ECHO MV MAX VELOCITY: 1.3 M/S
ECHO MV MEAN GRADIENT: 3 MMHG
ECHO MV MEAN VELOCITY: 0.8 M/S
ECHO MV PEAK GRADIENT: 7 MMHG
ECHO MV PRESSURE HALF TIME (PHT): 67.5 MS
ECHO MV VTI: 30.4 CM
ECHO PULMONARY ARTERY END DIASTOLIC PRESSURE: 2 MMHG
ECHO PV MAX VELOCITY: 1.9 M/S
ECHO PV MEAN GRADIENT: 6 MMHG
ECHO PV MEAN VELOCITY: 1.2 M/S
ECHO PV PEAK GRADIENT: 14 MMHG
ECHO PV REGURGITANT MAX VELOCITY: 0.8 M/S
ECHO PV VTI: 29.5 CM
ECHO PVEIN A DURATION: 139.9 MS
ECHO PVEIN A VELOCITY: 0.4 M/S
ECHO PVEIN PEAK D VELOCITY: 0.4 M/S
ECHO PVEIN PEAK S VELOCITY: 0.6 M/S
ECHO PVEIN S/D RATIO: 1.5
ECHO RIGHT VENTRICULAR SYSTOLIC PRESSURE (RVSP): 40 MMHG
ECHO RV INTERNAL DIMENSION: 3.8 CM
ECHO RV TAPSE: 2.5 CM (ref 1.7–?)
ECHO TV REGURGITANT MAX VELOCITY: 3.05 M/S
ECHO TV REGURGITANT PEAK GRADIENT: 37 MMHG
EKG ATRIAL RATE: 75 BPM
EKG ATRIAL RATE: 89 BPM
EKG P AXIS: 54 DEGREES
EKG P AXIS: 55 DEGREES
EKG P-R INTERVAL: 172 MS
EKG P-R INTERVAL: 236 MS
EKG Q-T INTERVAL: 398 MS
EKG Q-T INTERVAL: 426 MS
EKG QRS DURATION: 90 MS
EKG QRS DURATION: 92 MS
EKG QTC CALCULATION (BAZETT): 475 MS
EKG QTC CALCULATION (BAZETT): 484 MS
EKG R AXIS: -26 DEGREES
EKG R AXIS: -28 DEGREES
EKG T AXIS: 47 DEGREES
EKG T AXIS: 50 DEGREES
EKG VENTRICULAR RATE: 75 BPM
EKG VENTRICULAR RATE: 89 BPM
ERYTHROCYTE [DISTWIDTH] IN BLOOD BY AUTOMATED COUNT: 16.2 % (ref 11.5–15)
FERRITIN SERPL-MCNC: 79 NG/ML
FOLATE SERPL-MCNC: 14.5 NG/ML (ref 4.8–24.2)
GFR, ESTIMATED: 85 ML/MIN/1.73M2
GLUCOSE BLD-MCNC: 126 MG/DL (ref 74–99)
GLUCOSE BLD-MCNC: 167 MG/DL (ref 74–99)
GLUCOSE BLD-MCNC: 172 MG/DL (ref 74–99)
GLUCOSE BLD-MCNC: 186 MG/DL (ref 74–99)
GLUCOSE BLD-MCNC: 238 MG/DL (ref 74–99)
GLUCOSE SERPL-MCNC: 160 MG/DL (ref 74–99)
HBA1C MFR BLD: 8.4 % (ref 4–5.6)
HCT VFR BLD AUTO: 34.7 % (ref 37–54)
HDLC SERPL-MCNC: 32 MG/DL
HGB BLD-MCNC: 11.2 G/DL (ref 12.5–16.5)
IRON SATN MFR SERPL: 11 % (ref 20–55)
IRON SERPL-MCNC: 44 UG/DL (ref 59–158)
LDLC SERPL CALC-MCNC: 127 MG/DL
MAGNESIUM SERPL-MCNC: 1.5 MG/DL (ref 1.6–2.6)
MCH RBC QN AUTO: 25.5 PG (ref 26–35)
MCHC RBC AUTO-ENTMCNC: 32.3 G/DL (ref 32–34.5)
MCV RBC AUTO: 78.9 FL (ref 80–99.9)
PLATELET # BLD AUTO: 240 K/UL (ref 130–450)
PMV BLD AUTO: 11.4 FL (ref 7–12)
POTASSIUM SERPL-SCNC: 3.6 MMOL/L (ref 3.5–5)
RBC # BLD AUTO: 4.4 M/UL (ref 3.8–5.8)
SODIUM SERPL-SCNC: 138 MMOL/L (ref 132–146)
TIBC SERPL-MCNC: 388 UG/DL (ref 250–450)
TRIGL SERPL-MCNC: 163 MG/DL
TROPONIN I SERPL HS-MCNC: 26 NG/L (ref 0–11)
TSH SERPL DL<=0.05 MIU/L-ACNC: 2.25 UIU/ML (ref 0.27–4.2)
VIT B12 SERPL-MCNC: 301 PG/ML (ref 211–946)
VLDLC SERPL CALC-MCNC: 33 MG/DL
WBC OTHER # BLD: 7.9 K/UL (ref 4.5–11.5)

## 2025-03-06 PROCEDURE — 2500000003 HC RX 250 WO HCPCS: Performed by: HOSPITALIST

## 2025-03-06 PROCEDURE — 84443 ASSAY THYROID STIM HORMONE: CPT

## 2025-03-06 PROCEDURE — APPSS180 APP SPLIT SHARED TIME > 60 MINUTES: Performed by: CLINICAL NURSE SPECIALIST

## 2025-03-06 PROCEDURE — 85027 COMPLETE CBC AUTOMATED: CPT

## 2025-03-06 PROCEDURE — 6360000002 HC RX W HCPCS: Performed by: CLINICAL NURSE SPECIALIST

## 2025-03-06 PROCEDURE — 82728 ASSAY OF FERRITIN: CPT

## 2025-03-06 PROCEDURE — 73590 X-RAY EXAM OF LOWER LEG: CPT

## 2025-03-06 PROCEDURE — 2500000003 HC RX 250 WO HCPCS: Performed by: CLINICAL NURSE SPECIALIST

## 2025-03-06 PROCEDURE — 36415 COLL VENOUS BLD VENIPUNCTURE: CPT

## 2025-03-06 PROCEDURE — 73552 X-RAY EXAM OF FEMUR 2/>: CPT

## 2025-03-06 PROCEDURE — G0378 HOSPITAL OBSERVATION PER HR: HCPCS

## 2025-03-06 PROCEDURE — 93005 ELECTROCARDIOGRAM TRACING: CPT | Performed by: CLINICAL NURSE SPECIALIST

## 2025-03-06 PROCEDURE — 99222 1ST HOSP IP/OBS MODERATE 55: CPT | Performed by: THORACIC SURGERY (CARDIOTHORACIC VASCULAR SURGERY)

## 2025-03-06 PROCEDURE — 80048 BASIC METABOLIC PNL TOTAL CA: CPT

## 2025-03-06 PROCEDURE — 96365 THER/PROPH/DIAG IV INF INIT: CPT

## 2025-03-06 PROCEDURE — 83735 ASSAY OF MAGNESIUM: CPT

## 2025-03-06 PROCEDURE — 93306 TTE W/DOPPLER COMPLETE: CPT

## 2025-03-06 PROCEDURE — 99223 1ST HOSP IP/OBS HIGH 75: CPT | Performed by: INTERNAL MEDICINE

## 2025-03-06 PROCEDURE — 84484 ASSAY OF TROPONIN QUANT: CPT

## 2025-03-06 PROCEDURE — 82607 VITAMIN B-12: CPT

## 2025-03-06 PROCEDURE — 6370000000 HC RX 637 (ALT 250 FOR IP): Performed by: CLINICAL NURSE SPECIALIST

## 2025-03-06 PROCEDURE — 80061 LIPID PANEL: CPT

## 2025-03-06 PROCEDURE — 96366 THER/PROPH/DIAG IV INF ADDON: CPT

## 2025-03-06 PROCEDURE — 93306 TTE W/DOPPLER COMPLETE: CPT | Performed by: INTERNAL MEDICINE

## 2025-03-06 PROCEDURE — 93010 ELECTROCARDIOGRAM REPORT: CPT | Performed by: INTERNAL MEDICINE

## 2025-03-06 PROCEDURE — 6370000000 HC RX 637 (ALT 250 FOR IP): Performed by: HOSPITALIST

## 2025-03-06 PROCEDURE — 82962 GLUCOSE BLOOD TEST: CPT

## 2025-03-06 PROCEDURE — 83550 IRON BINDING TEST: CPT

## 2025-03-06 PROCEDURE — 82746 ASSAY OF FOLIC ACID SERUM: CPT

## 2025-03-06 PROCEDURE — 99232 SBSQ HOSP IP/OBS MODERATE 35: CPT | Performed by: INTERNAL MEDICINE

## 2025-03-06 PROCEDURE — 83540 ASSAY OF IRON: CPT

## 2025-03-06 PROCEDURE — 6370000000 HC RX 637 (ALT 250 FOR IP): Performed by: INTERNAL MEDICINE

## 2025-03-06 PROCEDURE — 83036 HEMOGLOBIN GLYCOSYLATED A1C: CPT

## 2025-03-06 PROCEDURE — 93971 EXTREMITY STUDY: CPT

## 2025-03-06 RX ORDER — OXYCODONE HYDROCHLORIDE 5 MG/1
2.5 TABLET ORAL EVERY 6 HOURS PRN
Status: DISCONTINUED | OUTPATIENT
Start: 2025-03-06 | End: 2025-03-07 | Stop reason: ALTCHOICE

## 2025-03-06 RX ORDER — ASPIRIN 81 MG/1
81 TABLET ORAL DAILY
Status: DISCONTINUED | OUTPATIENT
Start: 2025-03-07 | End: 2025-03-07 | Stop reason: SDUPTHER

## 2025-03-06 RX ORDER — POTASSIUM CHLORIDE 1500 MG/1
40 TABLET, EXTENDED RELEASE ORAL ONCE
Status: COMPLETED | OUTPATIENT
Start: 2025-03-06 | End: 2025-03-06

## 2025-03-06 RX ORDER — GLUCAGON 1 MG/ML
1 KIT INJECTION PRN
Status: DISCONTINUED | OUTPATIENT
Start: 2025-03-06 | End: 2025-03-08 | Stop reason: HOSPADM

## 2025-03-06 RX ORDER — ATORVASTATIN CALCIUM 40 MG/1
40 TABLET, FILM COATED ORAL NIGHTLY
Status: DISCONTINUED | OUTPATIENT
Start: 2025-03-06 | End: 2025-03-08 | Stop reason: HOSPADM

## 2025-03-06 RX ORDER — SODIUM CHLORIDE 0.9 % (FLUSH) 0.9 %
5-40 SYRINGE (ML) INJECTION EVERY 12 HOURS SCHEDULED
Status: DISCONTINUED | OUTPATIENT
Start: 2025-03-06 | End: 2025-03-08 | Stop reason: HOSPADM

## 2025-03-06 RX ORDER — MAGNESIUM SULFATE IN WATER 40 MG/ML
2000 INJECTION, SOLUTION INTRAVENOUS PRN
Status: DISCONTINUED | OUTPATIENT
Start: 2025-03-06 | End: 2025-03-08 | Stop reason: HOSPADM

## 2025-03-06 RX ORDER — SODIUM CHLORIDE 9 MG/ML
INJECTION, SOLUTION INTRAVENOUS PRN
Status: DISCONTINUED | OUTPATIENT
Start: 2025-03-06 | End: 2025-03-08 | Stop reason: HOSPADM

## 2025-03-06 RX ORDER — DEXTROSE MONOHYDRATE 100 MG/ML
INJECTION, SOLUTION INTRAVENOUS CONTINUOUS PRN
Status: DISCONTINUED | OUTPATIENT
Start: 2025-03-06 | End: 2025-03-08 | Stop reason: HOSPADM

## 2025-03-06 RX ORDER — METOPROLOL SUCCINATE 25 MG/1
25 TABLET, EXTENDED RELEASE ORAL DAILY
Status: DISCONTINUED | OUTPATIENT
Start: 2025-03-06 | End: 2025-03-08 | Stop reason: HOSPADM

## 2025-03-06 RX ORDER — METOPROLOL TARTRATE 50 MG
100 TABLET ORAL PRN
Status: DISCONTINUED | OUTPATIENT
Start: 2025-03-06 | End: 2025-03-06

## 2025-03-06 RX ORDER — REGADENOSON 0.08 MG/ML
0.4 INJECTION, SOLUTION INTRAVENOUS
Status: DISCONTINUED | OUTPATIENT
Start: 2025-03-06 | End: 2025-03-06

## 2025-03-06 RX ORDER — INSULIN GLARGINE 100 [IU]/ML
10 INJECTION, SOLUTION SUBCUTANEOUS NIGHTLY
Status: DISCONTINUED | OUTPATIENT
Start: 2025-03-06 | End: 2025-03-08 | Stop reason: HOSPADM

## 2025-03-06 RX ORDER — ONDANSETRON 4 MG/1
4 TABLET, ORALLY DISINTEGRATING ORAL EVERY 8 HOURS PRN
Status: DISCONTINUED | OUTPATIENT
Start: 2025-03-06 | End: 2025-03-08 | Stop reason: HOSPADM

## 2025-03-06 RX ORDER — METOPROLOL TARTRATE 50 MG
50 TABLET ORAL PRN
Status: DISCONTINUED | OUTPATIENT
Start: 2025-03-06 | End: 2025-03-06

## 2025-03-06 RX ORDER — HYDROCHLOROTHIAZIDE 12.5 MG/1
25 TABLET ORAL DAILY
Status: DISCONTINUED | OUTPATIENT
Start: 2025-03-06 | End: 2025-03-06

## 2025-03-06 RX ORDER — ACETAMINOPHEN 650 MG/1
650 SUPPOSITORY RECTAL EVERY 6 HOURS PRN
Status: DISCONTINUED | OUTPATIENT
Start: 2025-03-06 | End: 2025-03-07 | Stop reason: SDUPTHER

## 2025-03-06 RX ORDER — SODIUM CHLORIDE 0.9 % (FLUSH) 0.9 %
5-40 SYRINGE (ML) INJECTION PRN
Status: DISCONTINUED | OUTPATIENT
Start: 2025-03-06 | End: 2025-03-08 | Stop reason: HOSPADM

## 2025-03-06 RX ORDER — PREGABALIN 75 MG/1
150 CAPSULE ORAL 2 TIMES DAILY
Status: DISCONTINUED | OUTPATIENT
Start: 2025-03-06 | End: 2025-03-08 | Stop reason: HOSPADM

## 2025-03-06 RX ORDER — LOSARTAN POTASSIUM 25 MG/1
25 TABLET ORAL DAILY
Status: DISCONTINUED | OUTPATIENT
Start: 2025-03-06 | End: 2025-03-08 | Stop reason: HOSPADM

## 2025-03-06 RX ORDER — CLONIDINE HYDROCHLORIDE 0.1 MG/1
0.1 TABLET ORAL 2 TIMES DAILY
Status: DISCONTINUED | OUTPATIENT
Start: 2025-03-06 | End: 2025-03-08 | Stop reason: HOSPADM

## 2025-03-06 RX ORDER — METOPROLOL TARTRATE 50 MG
150 TABLET ORAL PRN
Status: DISCONTINUED | OUTPATIENT
Start: 2025-03-06 | End: 2025-03-06

## 2025-03-06 RX ORDER — ASPIRIN 325 MG
325 TABLET ORAL ONCE
Status: COMPLETED | OUTPATIENT
Start: 2025-03-06 | End: 2025-03-06

## 2025-03-06 RX ORDER — ONDANSETRON 2 MG/ML
4 INJECTION INTRAMUSCULAR; INTRAVENOUS EVERY 6 HOURS PRN
Status: DISCONTINUED | OUTPATIENT
Start: 2025-03-06 | End: 2025-03-08 | Stop reason: HOSPADM

## 2025-03-06 RX ORDER — OXYCODONE AND ACETAMINOPHEN 5; 325 MG/1; MG/1
1 TABLET ORAL EVERY 6 HOURS PRN
Status: DISCONTINUED | OUTPATIENT
Start: 2025-03-06 | End: 2025-03-07 | Stop reason: ALTCHOICE

## 2025-03-06 RX ORDER — POTASSIUM CHLORIDE 7.45 MG/ML
10 INJECTION INTRAVENOUS PRN
Status: DISCONTINUED | OUTPATIENT
Start: 2025-03-06 | End: 2025-03-08 | Stop reason: HOSPADM

## 2025-03-06 RX ORDER — ALPRAZOLAM 0.25 MG
0.5 TABLET ORAL NIGHTLY PRN
Status: DISCONTINUED | OUTPATIENT
Start: 2025-03-06 | End: 2025-03-08 | Stop reason: HOSPADM

## 2025-03-06 RX ORDER — LABETALOL HYDROCHLORIDE 5 MG/ML
10 INJECTION, SOLUTION INTRAVENOUS EVERY 4 HOURS PRN
Status: DISCONTINUED | OUTPATIENT
Start: 2025-03-06 | End: 2025-03-08 | Stop reason: HOSPADM

## 2025-03-06 RX ORDER — POTASSIUM CHLORIDE 1500 MG/1
40 TABLET, EXTENDED RELEASE ORAL PRN
Status: DISCONTINUED | OUTPATIENT
Start: 2025-03-06 | End: 2025-03-08 | Stop reason: HOSPADM

## 2025-03-06 RX ORDER — NITROGLYCERIN 0.4 MG/1
0.4 TABLET SUBLINGUAL PRN
Status: DISCONTINUED | OUTPATIENT
Start: 2025-03-06 | End: 2025-03-06

## 2025-03-06 RX ORDER — MAGNESIUM SULFATE IN WATER 40 MG/ML
2000 INJECTION, SOLUTION INTRAVENOUS ONCE
Status: COMPLETED | OUTPATIENT
Start: 2025-03-06 | End: 2025-03-06

## 2025-03-06 RX ORDER — ACETAMINOPHEN 325 MG/1
650 TABLET ORAL EVERY 6 HOURS PRN
Status: DISCONTINUED | OUTPATIENT
Start: 2025-03-06 | End: 2025-03-07 | Stop reason: SDUPTHER

## 2025-03-06 RX ORDER — DOCUSATE SODIUM 100 MG/1
100 CAPSULE, LIQUID FILLED ORAL DAILY
Status: DISCONTINUED | OUTPATIENT
Start: 2025-03-06 | End: 2025-03-08 | Stop reason: HOSPADM

## 2025-03-06 RX ORDER — CLONIDINE HYDROCHLORIDE 0.1 MG/1
0.1 TABLET ORAL 3 TIMES DAILY
Status: DISCONTINUED | OUTPATIENT
Start: 2025-03-06 | End: 2025-03-06

## 2025-03-06 RX ORDER — ROPINIROLE 0.25 MG/1
0.25 TABLET, FILM COATED ORAL NIGHTLY
Status: DISCONTINUED | OUTPATIENT
Start: 2025-03-06 | End: 2025-03-08 | Stop reason: HOSPADM

## 2025-03-06 RX ORDER — OXYCODONE AND ACETAMINOPHEN 7.5; 325 MG/1; MG/1
1 TABLET ORAL EVERY 6 HOURS PRN
Status: DISCONTINUED | OUTPATIENT
Start: 2025-03-06 | End: 2025-03-06 | Stop reason: CLARIF

## 2025-03-06 RX ORDER — INSULIN LISPRO 100 [IU]/ML
0-4 INJECTION, SOLUTION INTRAVENOUS; SUBCUTANEOUS
Status: DISCONTINUED | OUTPATIENT
Start: 2025-03-06 | End: 2025-03-08 | Stop reason: HOSPADM

## 2025-03-06 RX ORDER — NITROGLYCERIN 0.4 MG/1
0.8 TABLET SUBLINGUAL PRN
Status: DISCONTINUED | OUTPATIENT
Start: 2025-03-06 | End: 2025-03-06

## 2025-03-06 RX ORDER — POLYETHYLENE GLYCOL 3350 17 G/17G
17 POWDER, FOR SOLUTION ORAL DAILY PRN
Status: DISCONTINUED | OUTPATIENT
Start: 2025-03-06 | End: 2025-03-08 | Stop reason: HOSPADM

## 2025-03-06 RX ORDER — PANTOPRAZOLE SODIUM 40 MG/1
40 TABLET, DELAYED RELEASE ORAL
Status: DISCONTINUED | OUTPATIENT
Start: 2025-03-06 | End: 2025-03-08 | Stop reason: HOSPADM

## 2025-03-06 RX ORDER — LIDOCAINE 4 G/G
1 PATCH TOPICAL DAILY PRN
Status: DISCONTINUED | OUTPATIENT
Start: 2025-03-06 | End: 2025-03-08 | Stop reason: HOSPADM

## 2025-03-06 RX ADMIN — FLUOXETINE HYDROCHLORIDE 40 MG: 20 CAPSULE ORAL at 01:00

## 2025-03-06 RX ADMIN — PREGABALIN 150 MG: 75 CAPSULE ORAL at 08:53

## 2025-03-06 RX ADMIN — SODIUM CHLORIDE, PRESERVATIVE FREE 10 ML: 5 INJECTION INTRAVENOUS at 08:54

## 2025-03-06 RX ADMIN — LOSARTAN POTASSIUM 25 MG: 25 TABLET, FILM COATED ORAL at 14:35

## 2025-03-06 RX ADMIN — ATORVASTATIN CALCIUM 40 MG: 40 TABLET, FILM COATED ORAL at 21:04

## 2025-03-06 RX ADMIN — ROPINIROLE HYDROCHLORIDE 0.25 MG: 0.25 TABLET, FILM COATED ORAL at 00:59

## 2025-03-06 RX ADMIN — FLUOXETINE HYDROCHLORIDE 40 MG: 20 CAPSULE ORAL at 21:04

## 2025-03-06 RX ADMIN — CLONIDINE HYDROCHLORIDE 0.1 MG: 0.1 TABLET ORAL at 21:04

## 2025-03-06 RX ADMIN — PREGABALIN 150 MG: 75 CAPSULE ORAL at 01:12

## 2025-03-06 RX ADMIN — FLUOXETINE HYDROCHLORIDE 40 MG: 20 CAPSULE ORAL at 08:54

## 2025-03-06 RX ADMIN — SODIUM CHLORIDE, PRESERVATIVE FREE 10 ML: 5 INJECTION INTRAVENOUS at 21:04

## 2025-03-06 RX ADMIN — OXYCODONE AND ACETAMINOPHEN 1 TABLET: 325; 5 TABLET ORAL at 16:13

## 2025-03-06 RX ADMIN — OXYCODONE 2.5 MG: 5 TABLET ORAL at 01:01

## 2025-03-06 RX ADMIN — INSULIN GLARGINE 10 UNITS: 100 INJECTION, SOLUTION SUBCUTANEOUS at 21:04

## 2025-03-06 RX ADMIN — DOCUSATE SODIUM 100 MG: 100 CAPSULE, LIQUID FILLED ORAL at 18:31

## 2025-03-06 RX ADMIN — ROPINIROLE HYDROCHLORIDE 0.25 MG: 0.25 TABLET, FILM COATED ORAL at 21:04

## 2025-03-06 RX ADMIN — OXYCODONE 2.5 MG: 5 TABLET ORAL at 16:14

## 2025-03-06 RX ADMIN — OXYCODONE AND ACETAMINOPHEN 1 TABLET: 325; 5 TABLET ORAL at 01:02

## 2025-03-06 RX ADMIN — PREGABALIN 150 MG: 75 CAPSULE ORAL at 21:04

## 2025-03-06 RX ADMIN — OXYCODONE AND ACETAMINOPHEN 1 TABLET: 325; 5 TABLET ORAL at 09:07

## 2025-03-06 RX ADMIN — MAGNESIUM SULFATE HEPTAHYDRATE 2000 MG: 40 INJECTION, SOLUTION INTRAVENOUS at 14:46

## 2025-03-06 RX ADMIN — ASPIRIN 325 MG: 325 TABLET ORAL at 14:41

## 2025-03-06 RX ADMIN — POTASSIUM CHLORIDE 40 MEQ: 1500 TABLET, EXTENDED RELEASE ORAL at 14:35

## 2025-03-06 RX ADMIN — INSULIN LISPRO 1 UNITS: 100 INJECTION, SOLUTION INTRAVENOUS; SUBCUTANEOUS at 16:27

## 2025-03-06 RX ADMIN — OXYCODONE 2.5 MG: 5 TABLET ORAL at 09:08

## 2025-03-06 RX ADMIN — CLONIDINE HYDROCHLORIDE 0.1 MG: 0.1 TABLET ORAL at 08:54

## 2025-03-06 ASSESSMENT — PAIN DESCRIPTION - ORIENTATION
ORIENTATION: MID;LOWER
ORIENTATION: LEFT
ORIENTATION: MID;LOWER

## 2025-03-06 ASSESSMENT — PAIN SCALES - GENERAL
PAINLEVEL_OUTOF10: 5
PAINLEVEL_OUTOF10: 3
PAINLEVEL_OUTOF10: 0
PAINLEVEL_OUTOF10: 3
PAINLEVEL_OUTOF10: 9
PAINLEVEL_OUTOF10: 10
PAINLEVEL_OUTOF10: 3
PAINLEVEL_OUTOF10: 5
PAINLEVEL_OUTOF10: 3
PAINLEVEL_OUTOF10: 9
PAINLEVEL_OUTOF10: 0

## 2025-03-06 ASSESSMENT — PAIN DESCRIPTION - DESCRIPTORS
DESCRIPTORS: CRUSHING;DISCOMFORT;ACHING
DESCRIPTORS: ACHING;THROBBING;GNAWING

## 2025-03-06 ASSESSMENT — PAIN DESCRIPTION - LOCATION
LOCATION: BACK;LEG
LOCATION: BACK;HIP;LEG
LOCATION: LEG

## 2025-03-06 ASSESSMENT — PAIN - FUNCTIONAL ASSESSMENT
PAIN_FUNCTIONAL_ASSESSMENT: PREVENTS OR INTERFERES WITH MANY ACTIVE NOT PASSIVE ACTIVITIES
PAIN_FUNCTIONAL_ASSESSMENT: ACTIVITIES ARE NOT PREVENTED
PAIN_FUNCTIONAL_ASSESSMENT: PREVENTS OR INTERFERES SOME ACTIVE ACTIVITIES AND ADLS

## 2025-03-06 NOTE — CONSULTS
CTS Consult    Patient name: Javier Justin .    Reason for consult: Ascending aortic aneurysm and CP    Primary Care Physician: Raúl Farias MD    Date of service: 3/6/2025    Chief Complaint: CP    HPI: 74 year old with several medical problems presents with CP.  He had a CTA showing an ascending aortic aneurysm.  Cardiology is consulted.  The patient currently denies CP, SOB at rest, new LE edema, N/V, F/C, rigors, chills, orthopnea, PND and syncope.      Allergies:   Allergies   Allergen Reactions    Hydrocodone      Other Reaction(s): Face Swelling, blurred vision, shaking       Home medications:    Current Facility-Administered Medications   Medication Dose Route Frequency Provider Last Rate Last Admin    ALPRAZolam (XANAX) tablet 0.5 mg  0.5 mg Oral Nightly PRN Isac Jackson MD        FLUoxetine (PROZAC) capsule 40 mg  40 mg Oral BID Isac Jackson MD   40 mg at 03/06/25 0854    lidocaine 4 % external patch 1 patch  1 patch Topical Daily PRN Isac Jackson MD        metoprolol succinate (TOPROL XL) extended release tablet 25 mg  25 mg Oral Daily Isac Jackson MD        pregabalin (LYRICA) capsule 150 mg  150 mg Oral BID Isac Jackson MD   150 mg at 03/06/25 0853    rOPINIRole (REQUIP) tablet 0.25 mg  0.25 mg Oral Nightly Isac Jackson MD   0.25 mg at 03/06/25 0059    sodium chloride flush 0.9 % injection 5-40 mL  5-40 mL IntraVENous 2 times per day Isac Jackson MD   10 mL at 03/06/25 0854    sodium chloride flush 0.9 % injection 5-40 mL  5-40 mL IntraVENous PRN Isac Jackson MD        0.9 % sodium chloride infusion   IntraVENous PRN Isac Jackson MD        potassium chloride (KLOR-CON M) extended release tablet 40 mEq  40 mEq Oral PRN Isac Jackson MD        Or    potassium bicarb-citric acid (EFFER-K) effervescent tablet 40 mEq  40 mEq Oral PRN Isac Jackson MD        Or    potassium chloride 10 mEq/100 mL IVPB (Peripheral Line)  10 mEq IntraVENous PRN Isac Jackson MD        magnesium sulfate 2000 mg in 50 mL IVPB premix   effort. He is not in respiratory distress. Breath sounds clear to auscultation. No wheezes.   Heart: Normal rate. Regular rhythm. S1 normal and S2 normal. Negative for murmur.   Chest: Symmetric chest wall expansion.   Extremities: Normal range of motion.     Neurological: Patient is alert and oriented to person, place and time. Patient has normal reflexes.   Skin: Warm and dry.   Abdomen: Abdomen is soft and non-distended. Bowel sounds are normal. There is no abdominal tenderness tenderness. There is no guarding. There is no mass.   Pulses: Distal pulses are intact.  Skin: Warm and dry without lesions.        Assessment: Ascending aortic aneurysm        Plan: His pain is not from this aneurysm.  I measure it at 4.0cm.  He needs an echo to R/O BAV, and follow up in 1 year with a CT.  Defer further work up to cards.  Thank you.  Stone Croft MD        Electronically signed by Stone Croft MD on 3/6/2025 at 4:54 PM

## 2025-03-06 NOTE — PROGRESS NOTES
Spiritual Health History and Assessment/Progress Note  REBECCA  Ofelia Novi    (P) Spiritual/Emotional Needs,  ,  ,      Name: Javier Justin Sr. MRN: [id number]    Age: 74 y.o.     Sex: male   Language: English   Mormon: Restorationist   Chest pain     Date: 3/6/2025                           Spiritual Assessment began in [facility]        Referral/Consult From: (P) Rounding   Encounter Overview/Reason: (P) Spiritual/Emotional Needs  Service Provided For: (P) Patient    Carine, Belief, Meaning:   Patient is connected with a carine tradition or spiritual practice  Family/Friends No family/friends present      Importance and Influence:  Patient has no beliefs influential to healthcare decision-making identified during this visit  Family/Friends No family/friends present    Community:  Patient feels well-supported. Support system includes: Spouse/Partner and Children  Family/Friends No family/friends present    Assessment and Plan of Care:     Patient Interventions include: Facilitated expression of thoughts and feelings  Family/Friends Interventions include: No family/friends present    Patient Plan of Care: Spiritual Care available upon further referral  Family/Friends Plan of Care: No family/friends present    Electronically signed by Chaplain Amparo on 3/6/2025 at 10:06 AM

## 2025-03-06 NOTE — ED PROVIDER NOTES
Department of Emergency Medicine   ED  Provider Note  Admit Date/RoomTime: 3/5/2025  6:51 PM  ED Room: 24/24          History of Present Illness:  3/5/25, Time: 9:03 PM EST  Chief Complaint   Patient presents with    Chest Pain     Mid sternal cp/sob/chronic back pain, states he signed out AMA from [facility] yesterday                 Javier Justin Sr. is a 74 y.o. male presenting to the ED for chest pain.  Patient states he has been having chest pain and shortness of breath for the past couple of days.  Nothing makes it better or worse, also states the left lower extremity pain as well.  No fevers or chills.  No change in bowel or bladder.  No paresthesias.  Does have a history of diabetes, hypertension and hyperlipidemia.  He was post admitted yesterday, but signed out AMA.  He said he had to make arrangements, he would like to be admitted now.  No other symptoms or complaints.    Review of Systems:   Pertinent positives and negatives are stated within HPI, all other systems reviewed and are negative.        --------------------------------------------- PAST HISTORY ---------------------------------------------  Past Medical History:  has a past medical history of Anxiety, Back pain, Diabetes mellitus (HCC), Hyperlipidemia, Hypertension, and Sciatica.    Past Surgical History:  has a past surgical history that includes back surgery; knee surgery; other surgical history (5/8/2014); Carpal tunnel release; hip surgery (Right); Vena Cava Filter Placement (N/A, 11/7/2024); and invasive vascular (N/A, 1/16/2025).    Social History:  reports that he quit smoking about 11 years ago. His smoking use included cigarettes. He started smoking about 31 years ago. He has a 20 pack-year smoking history. He has never used smokeless tobacco. He reports that he does not drink alcohol and does not use drugs.    Family History: family history is not on file.. Unless otherwise noted, family history is non contributory    The patient’s home  DISPOSITION ---------------------------------    IMPRESSION  1. Chest pain, unspecified type    2. Angina pectoris        DISPOSITION  Disposition: Admit to telemetry  Patient condition is stable        NOTE: This report was transcribed using voice recognition software. Every effort was made to ensure accuracy; however, inadvertent computerized transcription errors may be present        Samuel Maxwell MD  03/06/25 0115

## 2025-03-06 NOTE — CARE COORDINATION
Patient presented to ED with c/o chest pain. CT showed dilatation of ascending aorta. Cardiology and thoracic cardiology consulted. Stress test and Echo ordered Met with patient to discuss transition of care. Patient lives with his wife and son who has special needs. They live in a two story home but stay on the first floor. He states that he owns a walker, cane, wc and scooter. He has not been able to drive since October due to numbness in left leg. Xrays of left femur and tibia ordered. Patient has h/o   [facility] and would prefer to use again if needed. Dr Farias is patient's PCP  and he uses [facility] on [address]. At this time patient plans to return home with HHC if needed and his wife will transport. CM/SW will continue to follow.  Case Management Assessment  Initial Evaluation    Date/Time of Evaluation: 3/6/2025 11:25 AM  Assessment Completed by: Gabrielle Davenport RN    If patient is discharged prior to next notation, then this note serves as note for discharge by case management.    Patient Name: Javier Justin Sr.                   YOB: 1950  Diagnosis: Angina pectoris [I20.9]  Chest pain [R07.9]  Chest pain, unspecified type [R07.9]                   Date / Time: 3/5/2025  6:51 PM    Patient Admission Status: Observation   Readmission Risk (Low < 19, Mod (19-27), High > 27): Readmission Risk Score: 16    Current PCP: Raúl Farias MD  PCP verified by CM? Yes    Chart Reviewed: Yes      History Provided by: Patient  Patient Orientation: Alert and Oriented    Patient Cognition: Alert    Hospitalization in the last 30 days (Readmission):  No    If yes, Readmission Assessment in  Navigator will be completed.    Advance Directives:      Code Status: Full Code   Patient's Primary Decision Maker is: Legal Next of Kin    Primary Decision Maker: MargothMonaeSunita - Spouse - [phone]    Discharge Planning:    Patient lives with: Spouse/Significant Other Type of Home: House  Primary

## 2025-03-06 NOTE — ED NOTES
Name: Javier Justin Sr.  : 1950  MRN: [id number]    Date: 3/5/2025    Benefits of immediately proceeding with Radiology exam outweigh the risks and therefore the following is being waived:      [] Pregnancy test    [] Protocol for Iodine allergy    [] MRI questionnaire    [x] BUN/Creatinine        MD Alissa Navarro, Samuel ROSADO MD  [phone]

## 2025-03-06 NOTE — PROGRESS NOTES
4 Eyes Skin Assessment     NAME:  Javier Justin .  YOB: 1950  MEDICAL RECORD NUMBER:  [id number]    The patient is being assessed for  Admission    I agree that at least one RN has performed a thorough Head to Toe Skin Assessment on the patient. ALL assessment sites listed below have been assessed.      Areas assessed by both nurses:    Head, Face, Ears, Shoulders, Back, Chest, Arms, Elbows, Hands, Sacrum. Buttock, Coccyx, Ischium, Legs. Feet and Heels, and Under Medical Devices         Does the Patient have a Wound? No noted wound(s)       Randell Prevention initiated by RN: No  Wound Care Orders initiated by RN: No    Pressure Injury (Stage 3,4, Unstageable, DTI, NWPT, and Complex wounds) if present, place Wound referral order by RN under : No    New Ostomies, if present place, Ostomy referral order under : No     Nurse 1 eSignature: Electronically signed by Jacquie Maher RN on 3/6/25 at 12:29 AM EST    **SHARE this note so that the co-signing nurse can place an eSignature**    Nurse 2 eSignature: {Esignature:[id number]}

## 2025-03-06 NOTE — PLAN OF CARE
Chart reviewed.  Patient's PCP is Dr. Raúl Farias who is covered by Dr. Mojica. Reached out to Dr. Mojica who stated he will take over the patient's case starting tomorrow morning.    Electronically signed by Teddy Garnett MD on 3/6/2025 at 2:34 PM

## 2025-03-06 NOTE — PROGRESS NOTES
[facility] Hospitalist Progress Note    Admitting Date and Time: 3/5/2025  6:51 PM  Admit Dx: Angina pectoris [I20.9]  Chest pain [R07.9]  Chest pain, unspecified type [R07.9]    Synopsis:    Mr. Javier Justin Sr., a 74 y.o. year old male  with PMH of class 2 obesity, T2DM, HTND, HLD , back pain with sciatica     Pt presented to ED for evaluation of chest pain, located middle sternal, some SOB, pt also has chronic back pain on narcotic.  Pt presented to [facility] on 3/4/2025 and admitted but signed out AMA due to concern on his home arrangement.  Pt presented to [facility] ED today for continued intermittent chest pain associated with SOB, also left lower leg pain.  Denies fever, chills, cough, N/V/abd pain.     3/6: When seen during rounds this morning patient was more concerned about his left lower extremity weakness and pain rather than his chest pain.  States chest pain is 2/10.    Subjective:  Patient is being followed for Angina pectoris [I20.9]  Chest pain [R07.9]  Chest pain, unspecified type [R07.9]     Patient seen and examined at bedside this morning.  He is very concerned about his LLE pain and weakness.  States his chronic gout been going on and has been causing him a lot of unsteadiness at home.  He is worried if he has a blood clot in his left leg as he was diagnosed previously for DVT on his right leg which was treated with anticoagulation and an IVC filter which has been removed.    When asked about chest pain as this seems like it was the chief complaint on the chart, he states it is mild 2/10 with no aggravating factors.    ROS: denies fever, chills, cp, sob, n/v, HA unless stated above.      cloNIDine  0.1 mg Oral TID    FLUoxetine  40 mg Oral BID    metoprolol succinate  25 mg Oral Daily    pregabalin  150 mg Oral BID    rOPINIRole  0.25 mg Oral Nightly    sodium chloride flush  5-40 mL IntraVENous 2 times per day    insulin glargine  10 Units SubCUTAneous Nightly    insulin lispro  0-4  138   K 3.7 4.0 3.6   CL 99 101 101   CO2 25 21* 22   BUN 16 15 12   CREATININE 1.2 1.0 1.0   GLUCOSE 148* 154* 160*   CALCIUM 9.9 9.5 9.6       Recent Labs     03/04/25  2145 03/05/25  1918 03/06/25  0452   WBC 8.1 7.5 7.9   RBC 4.36 4.31 4.40   HGB 11.4* 11.1* 11.2*   HCT 35.6* 34.4* 34.7*   MCV 81.7 79.8* 78.9*   MCH 26.1 25.8* 25.5*   MCHC 32.0 32.3 32.3   RDW 15.9* 16.1* 16.2*    221 240   MPV 11.2 11.1 11.4         Assessment:    Left lower extremity pain/weakness  Atypical chest pain  Ascending aorta dilatation  Chronic back pain with recent back surgery  T2DM on insulin  History of RLE DVT, patient states his vascular surgeon advised him to stop Xarelto and he has had his IVC filter removed  HTN  Anxiety      Plan:  Follow LLE DVT ultrasound, and left leg x-rays  Cardiology consulted: Follow echo and stress test  CTS consulted by overnight team given slowly enlarging dilatation of ascending aorta  Continue Lantus 10 units nightly and low dose sliding scale continue other medications including Lipitor, clonidine, Prozac, Cozaar, Toprol-XL, PPI, Lyrica, Requip, Xanax nightly as needed  PT/OT possibly tomorrow      DC planning: Pending workup as outlined above    NOTE: This report was transcribed using voice recognition software. Every effort was made to ensure accuracy; however, inadvertent computerized transcription errors may be present.    Electronically signed by Teddy Garnett MD on 3/6/2025 at 11:55 AM

## 2025-03-06 NOTE — PLAN OF CARE
Problem: Chronic Conditions and Co-morbidities  Goal: Patient's chronic conditions and co-morbidity symptoms are monitored and maintained or improved  Outcome: Progressing     Problem: Safety - Adult  Goal: Free from fall injury  Outcome: Progressing     Problem: Pain  Goal: Verbalizes/displays adequate comfort level or baseline comfort level  Outcome: Progressing     Problem: ABCDS Injury Assessment  Goal: Absence of physical injury  Outcome: Progressing     Problem: Cardiovascular - Adult  Goal: Maintains optimal cardiac output and hemodynamic stability  Outcome: Progressing  Goal: Absence of cardiac dysrhythmias or at baseline  Outcome: Progressing     Problem: Discharge Planning  Goal: Discharge to home or other facility with appropriate resources  Outcome: Progressing

## 2025-03-06 NOTE — PLAN OF CARE
Problem: Chronic Conditions and Co-morbidities  Goal: Patient's chronic conditions and co-morbidity symptoms are monitored and maintained or improved  3/6/2025 1025 by Pauline Stauffer RN  Outcome: Progressing     Problem: Safety - Adult  Goal: Free from fall injury  3/6/2025 1025 by Pauline Stauffer RN  Outcome: Progressing  Flowsheets (Taken 3/6/2025 1024)  Free From Fall Injury: Instruct family/caregiver on patient safety     Problem: Pain  Goal: Verbalizes/displays adequate comfort level or baseline comfort level  3/6/2025 1025 by Pauline Stauffer RN  Outcome: Progressing     Problem: ABCDS Injury Assessment  Goal: Absence of physical injury  3/6/2025 1025 by Pauline Stauffer RN  Outcome: Progressing  Flowsheets (Taken 3/6/2025 1024)  Absence of Physical Injury: Implement safety measures based on patient assessment     Problem: Cardiovascular - Adult  Goal: Maintains optimal cardiac output and hemodynamic stability  3/6/2025 1025 by Pauline Stauffer RN  Outcome: Progressing     Problem: Cardiovascular - Adult  Goal: Absence of cardiac dysrhythmias or at baseline  3/6/2025 1025 by Pauline Stauffer RN  Outcome: Progressing     Problem: Discharge Planning  Goal: Discharge to home or other facility with appropriate resources  3/6/2025 1025 by Pauline Stauffer RN  Outcome: Progressing

## 2025-03-06 NOTE — H&P
Hospital Medicine History & Physical      PCP: Raúl Farias MD    Date of Admission: 3/5/2025    Date of Service: Pt seen/examined on 3/5/2025 and is Placed in Observation.    Chief Complaint:  had concerns including Chest Pain (Mid sternal cp/sob/chronic back pain, states he signed out AMA from [facility] yesterday ).    History Of Present Illness:    Mr. Javier Justin Sr., a 74 y.o. year old male  with PMH of class 2 obesity, T2DM, HTND, HLD , back pain with sciatica    Pt presented to ED for evaluation of chest pain, located middle sternal, some SOB, pt also has chronic back pain on narcotic.  Pt presented to [facility] on 3/4/2025 and admitted but signed out AMA due to concern on his home arrangement.  Pt presented to [facility] ED today for continued intermittent chest pain associated with SOB, also left lower leg pain.  Denies fever, chills, cough, N/V/abd pain.      Pt was seen again around 3am, was given home dose percocet, in sleep, no acute distress, snores.         I have personally reviewed and interpreted the Initial workups as summarized below:     WBC normal, H/H stable chronic anemia, platelet stable normal range  Renal function stable in normal range  Glucose 154, last hemoglobin A1c was 7.6% on 9/30/2024  Hepatic function   stable at baseline    CT head showed no acute intracranial process  CXR  reviewed,with no acute cardiopulmonary process.   CTA pulmonary no evidence of PE, unchanged dilatation of the ascending aorta to 4.6CM    Troponin has been stable <30    2 EKGs from 3/4/2025 reviewed, normal sinus rhythm, no acute ST/T wave changes, patient had development of first-degree AV block by 6 PM compared with 5 AM.  With OR interval 238ms    Last nuclear medicine cardiac stress test on 5/28/2024 normal.    Past Medical History:        Diagnosis Date    Anxiety     Back pain     Diabetes mellitus (HCC)     Hyperlipidemia     Hypertension     Sciatica        Past Surgical History:        Procedure

## 2025-03-06 NOTE — CONSULTS
Inpatient Cardiology Consultation      Reason for Consult: Refractory chest pain    Consulting Physician: Dr. Ramirez    Requesting Physician: Dr. Garnett    Date of Consultation: 3/6/2025    HISTORY OF PRESENT ILLNESS:   Javier Justin Sr.  is a 74 y.o.  male not previously known to [facility] cardiology.  He is followed by [facility] cardiology.  An inpatient consultation from Dr. French is visible from 5/28/2024 at that time for chest pain.  TROP 24-25 CR 1.5  Lexiscan nuclear stress test no inducible ischemia EF 74%    Patient is a retired paramedic   Frequent ED visits for uncontrolled back pain    [place] ED 3/4/2025: Chest pain, dizziness, off-balance after taking Percocet at 3 AM.  D-dimer 1053 chest pain was felt to be pleurisy patient was advised transfer to hospital for admit.  Orthostatic negative He left AMA.    ED 3/5/2025 1821 via EMS for chest pain/SOB  Arrival vitals: /110 HR 96 SpO2 93% on room air afebrile  Significant Labs: Troponin 09-67-69-29-27-29-26.  CK total 269.  NT proBNP 47-81-70.  D-dimer 1053 lactic acid 1.8 mag 1.3 (2g mag IV given 3/4/25 repeat pending) K3.6 BUN 12 Cr 1.0 albumin 4.3 alk phos 131 AST 54 ALT 28 A1c 8.4 WBC 7.9 Hgb 12.2-11.2   Urine drug screen positive for benzodiazepines and oxycodone patient has prescription for both  UA unremarkable  RAD: CTA of the pulmonary with contrast: No PE.  Ascending aorta dilated 4.6 cm.  Unchanged.  CT of the head without contrast: No acute finding.  Chronic microvascular disease.  Portable chest x-ray today no acute process  X-rays of left femur and left fibula are pending  Ultrasound bilateral lower extremities pending  ED treatment: Fentanyl  Echocardiogram and Lexiscan nuclear stress test have been ordered by attending  Cardiothoracic surgery has been consulted for ascending aortic dilatation of 4.6 cm on CT of the pulmonary with 0.3 cm progression and less than 6 months  EKG -    Patient seen    CALCIUM 9.5 9.6     Mag:   Recent Labs     03/04/25  0535   MG 1.3*     Phos: No results for input(s): \"PHOS\" in the last 72 hours.  TSH:   Lab Results   Component Value Date    TSH 3.630 01/09/2017       HgA1c:   Lab Results   Component Value Date    LABA1C 8.4 (H) 03/06/2025       PRO-BNP:   Lab Results   Component Value Date    PROBNP 70 03/05/2025    PROBNP 81 03/04/2025    PROBNP 47 03/04/2025    PROBNP 673 (H) 05/27/2024    PROBNP 23 04/10/2015     PT/INR:   Recent Labs     03/04/25  0535   PROTIME 14.1*   INR 1.3     HS TROP:  Lab Results   Component Value Date/Time    TROPHS 26 03/06/2025 04:52 AM    TROPHS 29 03/05/2025 09:49 PM    TROPHS 27 03/05/2025 07:18 PM    TROPHS 29 03/04/2025 11:17 PM    TROPHS 27 03/04/2025 09:45 PM    TROPHS 26 03/04/2025 06:15 AM    TROPHS 28 03/04/2025 05:35 AM    TROPHS 27 02/28/2025 08:00 PM    TROPHS 25 02/28/2025 05:45 PM    TROPHS 38 11/19/2024 06:18 PM     LIPID PANEL:  Lab Results   Component Value Date    CHOL 178 05/28/2024    TRIG 152 (H) 05/28/2024    HDL 48 05/28/2024     (H) 05/28/2024    VLDL 30 05/28/2024    CHOLHDLRATIO 2.6 07/08/2020       LIVER PROFILE:  Recent Labs     03/04/25  2145 03/05/25  1918   AST 38 54*   ALT 25 28         Assessment / Plan: as per Dr Ramirez - addendum to follow   Cancel stress test.   Dr Ramirez reviewed CT chest - high calcium for [facility] [place] 3/7/2025  AUC Indication 3 Score 8   Carli CONLEY-[facility] Cardiology   Electronically signed by SHIRA Kim on 3/6/2025 at 10:21 AM

## 2025-03-07 LAB
ACTIVATED CLOTTING TIME, LOW RANGE: 276 SEC
ACTIVATED CLOTTING TIME, LOW RANGE: 309 SEC
ANION GAP SERPL CALCULATED.3IONS-SCNC: 16 MMOL/L (ref 7–16)
BASOPHILS # BLD: 0.04 K/UL (ref 0–0.2)
BASOPHILS NFR BLD: 1 % (ref 0–2)
BUN SERPL-MCNC: 13 MG/DL (ref 6–23)
CALCIUM SERPL-MCNC: 9.4 MG/DL (ref 8.6–10.2)
CHLORIDE SERPL-SCNC: 103 MMOL/L (ref 98–107)
CO2 SERPL-SCNC: 21 MMOL/L (ref 22–29)
CREAT SERPL-MCNC: 0.9 MG/DL (ref 0.7–1.2)
ECHO BSA: 2.35 M2
EOSINOPHIL # BLD: 0.18 K/UL (ref 0.05–0.5)
EOSINOPHILS RELATIVE PERCENT: 3 % (ref 0–6)
ERYTHROCYTE [DISTWIDTH] IN BLOOD BY AUTOMATED COUNT: 16.4 % (ref 11.5–15)
GFR, ESTIMATED: 87 ML/MIN/1.73M2
GLUCOSE BLD-MCNC: 165 MG/DL (ref 74–99)
GLUCOSE BLD-MCNC: 165 MG/DL (ref 74–99)
GLUCOSE BLD-MCNC: 171 MG/DL (ref 74–99)
GLUCOSE BLD-MCNC: 173 MG/DL (ref 74–99)
GLUCOSE SERPL-MCNC: 178 MG/DL (ref 74–99)
HCT VFR BLD AUTO: 34.8 % (ref 37–54)
HGB BLD-MCNC: 11.2 G/DL (ref 12.5–16.5)
IMM GRANULOCYTES # BLD AUTO: <0.03 K/UL (ref 0–0.58)
IMM GRANULOCYTES NFR BLD: 0 % (ref 0–5)
LYMPHOCYTES NFR BLD: 1.53 K/UL (ref 1.5–4)
LYMPHOCYTES RELATIVE PERCENT: 22 % (ref 20–42)
MAGNESIUM SERPL-MCNC: 1.6 MG/DL (ref 1.6–2.6)
MCH RBC QN AUTO: 25.6 PG (ref 26–35)
MCHC RBC AUTO-ENTMCNC: 32.2 G/DL (ref 32–34.5)
MCV RBC AUTO: 79.6 FL (ref 80–99.9)
MONOCYTES NFR BLD: 0.79 K/UL (ref 0.1–0.95)
MONOCYTES NFR BLD: 11 % (ref 2–12)
NEUTROPHILS NFR BLD: 64 % (ref 43–80)
NEUTS SEG NFR BLD: 4.48 K/UL (ref 1.8–7.3)
PLATELET # BLD AUTO: 211 K/UL (ref 130–450)
PMV BLD AUTO: 10.7 FL (ref 7–12)
POTASSIUM SERPL-SCNC: 3.9 MMOL/L (ref 3.5–5)
RBC # BLD AUTO: 4.37 M/UL (ref 3.8–5.8)
SODIUM SERPL-SCNC: 140 MMOL/L (ref 132–146)
WBC OTHER # BLD: 7 K/UL (ref 4.5–11.5)

## 2025-03-07 PROCEDURE — 82962 GLUCOSE BLOOD TEST: CPT

## 2025-03-07 PROCEDURE — C1725 CATH, TRANSLUMIN NON-LASER: HCPCS | Performed by: INTERNAL MEDICINE

## 2025-03-07 PROCEDURE — 6360000004 HC RX CONTRAST MEDICATION: Performed by: INTERNAL MEDICINE

## 2025-03-07 PROCEDURE — 93571 IV DOP VEL&/PRESS C FLO 1ST: CPT | Performed by: INTERNAL MEDICINE

## 2025-03-07 PROCEDURE — C1874 STENT, COATED/COV W/DEL SYS: HCPCS | Performed by: INTERNAL MEDICINE

## 2025-03-07 PROCEDURE — C1887 CATHETER, GUIDING: HCPCS | Performed by: INTERNAL MEDICINE

## 2025-03-07 PROCEDURE — 83735 ASSAY OF MAGNESIUM: CPT

## 2025-03-07 PROCEDURE — 2500000003 HC RX 250 WO HCPCS: Performed by: HOSPITALIST

## 2025-03-07 PROCEDURE — C9600 PERC DRUG-EL COR STENT SING: HCPCS | Performed by: INTERNAL MEDICINE

## 2025-03-07 PROCEDURE — 36415 COLL VENOUS BLD VENIPUNCTURE: CPT

## 2025-03-07 PROCEDURE — 6370000000 HC RX 637 (ALT 250 FOR IP): Performed by: INTERNAL MEDICINE

## 2025-03-07 PROCEDURE — 85347 COAGULATION TIME ACTIVATED: CPT

## 2025-03-07 PROCEDURE — 6360000002 HC RX W HCPCS: Performed by: INTERNAL MEDICINE

## 2025-03-07 PROCEDURE — G0378 HOSPITAL OBSERVATION PER HR: HCPCS

## 2025-03-07 PROCEDURE — 92928 PRQ TCAT PLMT NTRAC ST 1 LES: CPT | Performed by: INTERNAL MEDICINE

## 2025-03-07 PROCEDURE — C1769 GUIDE WIRE: HCPCS | Performed by: INTERNAL MEDICINE

## 2025-03-07 PROCEDURE — 6370000000 HC RX 637 (ALT 250 FOR IP): Performed by: HOSPITALIST

## 2025-03-07 PROCEDURE — 2500000003 HC RX 250 WO HCPCS: Performed by: CLINICAL NURSE SPECIALIST

## 2025-03-07 PROCEDURE — C1894 INTRO/SHEATH, NON-LASER: HCPCS | Performed by: INTERNAL MEDICINE

## 2025-03-07 PROCEDURE — 2709999900 HC NON-CHARGEABLE SUPPLY: Performed by: INTERNAL MEDICINE

## 2025-03-07 PROCEDURE — 80048 BASIC METABOLIC PNL TOTAL CA: CPT

## 2025-03-07 PROCEDURE — 99233 SBSQ HOSP IP/OBS HIGH 50: CPT | Performed by: INTERNAL MEDICINE

## 2025-03-07 PROCEDURE — 2500000003 HC RX 250 WO HCPCS: Performed by: INTERNAL MEDICINE

## 2025-03-07 PROCEDURE — 93458 L HRT ARTERY/VENTRICLE ANGIO: CPT | Performed by: INTERNAL MEDICINE

## 2025-03-07 PROCEDURE — 6370000000 HC RX 637 (ALT 250 FOR IP): Performed by: CLINICAL NURSE SPECIALIST

## 2025-03-07 PROCEDURE — 2580000003 HC RX 258: Performed by: INTERNAL MEDICINE

## 2025-03-07 PROCEDURE — 85025 COMPLETE CBC W/AUTO DIFF WBC: CPT

## 2025-03-07 DEVICE — STENT CORONARY ONYX FRONTIER RX 2.25X38 MM ZOTAROLIMUS ELUT: Type: IMPLANTABLE DEVICE | Status: FUNCTIONAL

## 2025-03-07 RX ORDER — IOPAMIDOL 755 MG/ML
INJECTION, SOLUTION INTRAVASCULAR PRN
Status: DISCONTINUED | OUTPATIENT
Start: 2025-03-07 | End: 2025-03-07 | Stop reason: HOSPADM

## 2025-03-07 RX ORDER — NITROGLYCERIN 20 MG/100ML
INJECTION INTRAVENOUS CONTINUOUS PRN
Status: COMPLETED | OUTPATIENT
Start: 2025-03-07 | End: 2025-03-07

## 2025-03-07 RX ORDER — MIDAZOLAM HYDROCHLORIDE 1 MG/ML
INJECTION, SOLUTION INTRAMUSCULAR; INTRAVENOUS PRN
Status: DISCONTINUED | OUTPATIENT
Start: 2025-03-07 | End: 2025-03-07 | Stop reason: HOSPADM

## 2025-03-07 RX ORDER — ASPIRIN 81 MG/1
81 TABLET ORAL DAILY
Status: DISCONTINUED | OUTPATIENT
Start: 2025-03-07 | End: 2025-03-08 | Stop reason: HOSPADM

## 2025-03-07 RX ORDER — VERAPAMIL HYDROCHLORIDE 2.5 MG/ML
INJECTION, SOLUTION INTRAVENOUS PRN
Status: DISCONTINUED | OUTPATIENT
Start: 2025-03-07 | End: 2025-03-07 | Stop reason: HOSPADM

## 2025-03-07 RX ORDER — OXYCODONE AND ACETAMINOPHEN 5; 325 MG/1; MG/1
1 TABLET ORAL EVERY 4 HOURS PRN
Status: DISCONTINUED | OUTPATIENT
Start: 2025-03-07 | End: 2025-03-08 | Stop reason: HOSPADM

## 2025-03-07 RX ORDER — ACETAMINOPHEN 325 MG/1
650 TABLET ORAL EVERY 4 HOURS PRN
Status: DISCONTINUED | OUTPATIENT
Start: 2025-03-07 | End: 2025-03-07

## 2025-03-07 RX ORDER — SODIUM CHLORIDE 9 MG/ML
INJECTION, SOLUTION INTRAVENOUS CONTINUOUS
Status: ACTIVE | OUTPATIENT
Start: 2025-03-07 | End: 2025-03-07

## 2025-03-07 RX ORDER — FENTANYL CITRATE 50 UG/ML
INJECTION, SOLUTION INTRAMUSCULAR; INTRAVENOUS PRN
Status: DISCONTINUED | OUTPATIENT
Start: 2025-03-07 | End: 2025-03-07 | Stop reason: HOSPADM

## 2025-03-07 RX ORDER — HEPARIN SODIUM 10000 [USP'U]/ML
INJECTION, SOLUTION INTRAVENOUS; SUBCUTANEOUS PRN
Status: DISCONTINUED | OUTPATIENT
Start: 2025-03-07 | End: 2025-03-07 | Stop reason: HOSPADM

## 2025-03-07 RX ORDER — ACETAMINOPHEN 325 MG/1
650 TABLET ORAL EVERY 4 HOURS PRN
Status: DISCONTINUED | OUTPATIENT
Start: 2025-03-07 | End: 2025-03-08 | Stop reason: HOSPADM

## 2025-03-07 RX ORDER — HEPARIN SODIUM 1000 [USP'U]/ML
INJECTION, SOLUTION INTRAVENOUS; SUBCUTANEOUS PRN
Status: DISCONTINUED | OUTPATIENT
Start: 2025-03-07 | End: 2025-03-07 | Stop reason: HOSPADM

## 2025-03-07 RX ADMIN — DOCUSATE SODIUM 100 MG: 100 CAPSULE, LIQUID FILLED ORAL at 11:14

## 2025-03-07 RX ADMIN — PREGABALIN 150 MG: 75 CAPSULE ORAL at 19:44

## 2025-03-07 RX ADMIN — CLONIDINE HYDROCHLORIDE 0.1 MG: 0.1 TABLET ORAL at 19:44

## 2025-03-07 RX ADMIN — METOPROLOL SUCCINATE 25 MG: 25 TABLET, EXTENDED RELEASE ORAL at 07:20

## 2025-03-07 RX ADMIN — OXYCODONE HYDROCHLORIDE AND ACETAMINOPHEN 1 TABLET: 5; 325 TABLET ORAL at 16:30

## 2025-03-07 RX ADMIN — OXYCODONE AND ACETAMINOPHEN 1 TABLET: 325; 5 TABLET ORAL at 03:52

## 2025-03-07 RX ADMIN — SODIUM CHLORIDE, PRESERVATIVE FREE 10 ML: 5 INJECTION INTRAVENOUS at 19:45

## 2025-03-07 RX ADMIN — PANTOPRAZOLE SODIUM 40 MG: 40 TABLET, DELAYED RELEASE ORAL at 06:55

## 2025-03-07 RX ADMIN — TICAGRELOR 90 MG: 90 TABLET ORAL at 19:45

## 2025-03-07 RX ADMIN — SODIUM CHLORIDE, PRESERVATIVE FREE 10 ML: 5 INJECTION INTRAVENOUS at 11:16

## 2025-03-07 RX ADMIN — OXYCODONE HYDROCHLORIDE AND ACETAMINOPHEN 1 TABLET: 5; 325 TABLET ORAL at 20:58

## 2025-03-07 RX ADMIN — FLUOXETINE HYDROCHLORIDE 40 MG: 20 CAPSULE ORAL at 11:22

## 2025-03-07 RX ADMIN — INSULIN GLARGINE 10 UNITS: 100 INJECTION, SOLUTION SUBCUTANEOUS at 21:17

## 2025-03-07 RX ADMIN — OXYCODONE HYDROCHLORIDE AND ACETAMINOPHEN 1 TABLET: 5; 325 TABLET ORAL at 11:15

## 2025-03-07 RX ADMIN — FLUOXETINE HYDROCHLORIDE 40 MG: 20 CAPSULE ORAL at 19:44

## 2025-03-07 RX ADMIN — SODIUM CHLORIDE: 9 INJECTION, SOLUTION INTRAVENOUS at 11:26

## 2025-03-07 RX ADMIN — PREGABALIN 150 MG: 75 CAPSULE ORAL at 11:14

## 2025-03-07 RX ADMIN — ALPRAZOLAM 0.5 MG: 0.25 TABLET ORAL at 19:53

## 2025-03-07 RX ADMIN — ROPINIROLE HYDROCHLORIDE 0.25 MG: 0.25 TABLET, FILM COATED ORAL at 19:44

## 2025-03-07 RX ADMIN — CLONIDINE HYDROCHLORIDE 0.1 MG: 0.1 TABLET ORAL at 07:20

## 2025-03-07 RX ADMIN — ASPIRIN 81 MG: 81 TABLET, COATED ORAL at 07:20

## 2025-03-07 RX ADMIN — LOSARTAN POTASSIUM 25 MG: 25 TABLET, FILM COATED ORAL at 07:20

## 2025-03-07 RX ADMIN — ATORVASTATIN CALCIUM 40 MG: 40 TABLET, FILM COATED ORAL at 19:44

## 2025-03-07 ASSESSMENT — PAIN DESCRIPTION - LOCATION
LOCATION: BACK

## 2025-03-07 ASSESSMENT — PAIN DESCRIPTION - ONSET
ONSET: ON-GOING
ONSET: ON-GOING

## 2025-03-07 ASSESSMENT — PAIN - FUNCTIONAL ASSESSMENT
PAIN_FUNCTIONAL_ASSESSMENT: ACTIVITIES ARE NOT PREVENTED
PAIN_FUNCTIONAL_ASSESSMENT: PREVENTS OR INTERFERES SOME ACTIVE ACTIVITIES AND ADLS

## 2025-03-07 ASSESSMENT — PAIN DESCRIPTION - DESCRIPTORS
DESCRIPTORS: ACHING;DISCOMFORT;SORE
DESCRIPTORS: ACHING;PRESSURE;SORE
DESCRIPTORS: ACHING

## 2025-03-07 ASSESSMENT — PAIN DESCRIPTION - PAIN TYPE
TYPE: CHRONIC PAIN
TYPE: CHRONIC PAIN

## 2025-03-07 ASSESSMENT — PAIN SCALES - GENERAL
PAINLEVEL_OUTOF10: 10
PAINLEVEL_OUTOF10: 2
PAINLEVEL_OUTOF10: 8
PAINLEVEL_OUTOF10: 8
PAINLEVEL_OUTOF10: 0
PAINLEVEL_OUTOF10: 1

## 2025-03-07 ASSESSMENT — PAIN DESCRIPTION - FREQUENCY
FREQUENCY: CONTINUOUS
FREQUENCY: CONTINUOUS

## 2025-03-07 ASSESSMENT — PAIN DESCRIPTION - ORIENTATION
ORIENTATION: RIGHT;LEFT
ORIENTATION: LEFT;RIGHT

## 2025-03-07 NOTE — PROGRESS NOTES
INPATIENT CARDIOLOGY FOLLOW-UP    Name: Javier Justin Sr.    Age: 74 y.o.    Date of Admission: 3/5/2025  6:51 PM    Date of Service: 3/7/2025    Chief Complaint: Follow-up for CP    Interim History:  No new overnight cardiac complaints. Currently with no complaints of CP, SOB, palpitations, dizziness, or lightheadedness. SR on telemetry.    Review of Systems:   Cardiac: As per HPI  General: No fever, chills  Pulmonary: As per HPI  HEENT: No visual disturbances, difficult swallowing  GI: No nausea, vomiting  Endocrine: No thyroid disease or DM  Musculoskeletal: JUAREZ x 4, no focal motor deficits  Skin: Intact, no rashes  Neuro/Psych: No headache or seizures    Problem List:  Patient Active Problem List   Diagnosis    Low back pain with right-sided sciatica    Type 2 diabetes mellitus with hyperglycemia, with long-term current use of insulin ([facility])    Essential hypertension    Pure hypercholesterolemia    Depression    Chronic low back pain    Sciatica    Acute pain of right knee    Chest pain    Class 2 severe obesity with serious comorbidity and body mass index (BMI) of 38.0 to 38.9 in adult    Acute kidney injury    Low back pain    Acute deep vein thrombosis (DVT) of popliteal vein of right lower extremity (HCC)    S/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    Pleurisy    Dizziness    Hypomagnesemia    Ascending aorta dilatation 4.6cm 3/2025    Microcytic anemia    Gastroesophageal reflux disease with esophagitis without hemorrhage       Allergies:  Allergies   Allergen Reactions    Hydrocodone      Other Reaction(s): Face Swelling, blurred vision, shaking       Current Medications:  Current Facility-Administered Medications   Medication Dose Route Frequency Provider Last Rate Last Admin    ALPRAZolam (XANAX) tablet 0.5 mg  0.5 mg Oral Nightly PRN Isac Jackson MD        FLUoxetine (PROZAC) capsule 40 mg  40 mg Oral BID Isac Jackson MD   40 mg at 03/06/25 2104    lidocaine 4 % external patch 1 patch  1 patch

## 2025-03-07 NOTE — PROGRESS NOTES
Subjective:    Chief complaint:    Still having left-sided chest pain in the left lower rib area  Patient has been constant  Not really related with activity  There is some positional component        Objective:    /86   Pulse 76   Temp 97.7 °F (36.5 °C) (Axillary)   Resp 18   Ht 1.753 m (5' 9\")   Wt 113.4 kg (250 lb)   SpO2 95%   BMI 36.92 kg/m²   General : Awake ,alert,no distress.  Heart:  RRR, no murmurs, gallops, or rubs.  Lungs:  CTA bilaterally, no wheeze, rales or rhonchi  Abd: bowel sounds present, nontender, nondistended, no masses  Extrem:  No clubbing, cyanosis, or edema    CBC:   Lab Results   Component Value Date/Time    WBC 7.0 03/07/2025 05:14 AM    RBC 4.37 03/07/2025 05:14 AM    HGB 11.2 03/07/2025 05:14 AM    HCT 34.8 03/07/2025 05:14 AM    MCV 79.6 03/07/2025 05:14 AM    MCH 25.6 03/07/2025 05:14 AM    MCHC 32.2 03/07/2025 05:14 AM    RDW 16.4 03/07/2025 05:14 AM     03/07/2025 05:14 AM    MPV 10.7 03/07/2025 05:14 AM     BMP:    Lab Results   Component Value Date/Time     03/07/2025 05:14 AM    K 3.9 03/07/2025 05:14 AM    K 4.5 02/18/2023 05:50 PM     03/07/2025 05:14 AM    CO2 21 03/07/2025 05:14 AM    BUN 13 03/07/2025 05:14 AM    CREATININE 0.9 03/07/2025 05:14 AM    CALCIUM 9.4 03/07/2025 05:14 AM    GFRAA >60 08/04/2020 01:51 PM    LABGLOM 87 03/07/2025 05:14 AM    LABGLOM 58 04/17/2024 03:07 PM    GLUCOSE 178 03/07/2025 05:14 AM     PT/INR:    Lab Results   Component Value Date/Time    PROTIME 14.1 03/04/2025 05:35 AM    INR 1.3 03/04/2025 05:35 AM     Troponin:    Lab Results   Component Value Date/Time    TROPONINI <0.01 08/04/2020 01:51 PM       No results for input(s): \"LABURIN\" in the last 72 hours.  No results for input(s): \"BC\" in the last 72 hours.  No results for input(s): \"BLOODCULT2\" in the last 72 hours.      Current Facility-Administered Medications:     ALPRAZolam (XANAX) tablet 0.5 mg, 0.5 mg, Oral, Nightly PRN, Isac Jackson MD    FLUoxetine       No acute fractures in the left tibia and fibula.      No acute fractures or dislocations in the left ankle.         XR FEMUR LEFT (MIN 2 VIEWS)   Final Result   1. No acute fracture or dislocation about the left femur.   2. Total left knee prosthesis appears in satisfactory orientation.         Vascular duplex lower extremity venous left   Final Result   1.  No sonographic evidence of DVT in the left lower extremity.         XR CHEST PORTABLE   Final Result   No acute process.         CTA PULMONARY W CONTRAST   Final Result   1.  No evidence of pulmonary embolism or acute pulmonary abnormality.      2.  Unchanged dilation of the ascending aorta to 4.6 cm.         CT HEAD WO CONTRAST   Final Result   1. No acute intracranial abnormality.   2. Periventricular white matter changes consistent with chronic microvascular   disease.   3. Fluid in the right mastoid air cells.             Assessment:    Principal Problem:    Chest pain  Active Problems:    Type 2 diabetes mellitus with hyperglycemia, with long-term current use of insulin ([facility])    Essential hypertension    Class 2 severe obesity with serious comorbidity and body mass index (BMI) of 38.0 to 38.9 in adult    Ascending aorta dilatation 4.6cm 3/2025    Microcytic anemia    Gastroesophageal reflux disease with esophagitis without hemorrhage  Resolved Problems:    * No resolved hospital problems. *      Plan:    Echo results noted  CT of the chest results noted  Cardiothoracic surgery input noted  Discussed with cardiology  For heart cath today        Jeffry Mojica MD  8:05 AM  3/7/2025    NOTE: This report was transcribed using voice recognition software. Every effort was made to ensure accuracy; however, inadvertent transcription errors may be present

## 2025-03-07 NOTE — CARE COORDINATION
Patient had cardiac cath with stent today. Cardio thoracic following for AAA. LLE imaging negative. At this time patient plans to return home and his wife will transport. At this time. Patient declines need for HHC. CM/SW will continue to follow

## 2025-03-08 VITALS
HEART RATE: 67 BPM | SYSTOLIC BLOOD PRESSURE: 142 MMHG | OXYGEN SATURATION: 99 % | WEIGHT: 250.7 LBS | BODY MASS INDEX: 37.13 KG/M2 | HEIGHT: 69 IN | RESPIRATION RATE: 18 BRPM | DIASTOLIC BLOOD PRESSURE: 87 MMHG | TEMPERATURE: 97.9 F

## 2025-03-08 LAB
ANION GAP SERPL CALCULATED.3IONS-SCNC: 15 MMOL/L (ref 7–16)
BASOPHILS # BLD: 0.05 K/UL (ref 0–0.2)
BASOPHILS NFR BLD: 1 % (ref 0–2)
BUN SERPL-MCNC: 12 MG/DL (ref 6–23)
CALCIUM SERPL-MCNC: 9.6 MG/DL (ref 8.6–10.2)
CHLORIDE SERPL-SCNC: 101 MMOL/L (ref 98–107)
CO2 SERPL-SCNC: 21 MMOL/L (ref 22–29)
CREAT SERPL-MCNC: 1 MG/DL (ref 0.7–1.2)
EKG ATRIAL RATE: 70 BPM
EKG P AXIS: 67 DEGREES
EKG P-R INTERVAL: 184 MS
EKG Q-T INTERVAL: 434 MS
EKG QRS DURATION: 94 MS
EKG QTC CALCULATION (BAZETT): 468 MS
EKG R AXIS: -25 DEGREES
EKG T AXIS: 42 DEGREES
EKG VENTRICULAR RATE: 70 BPM
EOSINOPHIL # BLD: 0.19 K/UL (ref 0.05–0.5)
EOSINOPHILS RELATIVE PERCENT: 2 % (ref 0–6)
ERYTHROCYTE [DISTWIDTH] IN BLOOD BY AUTOMATED COUNT: 16.4 % (ref 11.5–15)
GFR, ESTIMATED: 77 ML/MIN/1.73M2
GLUCOSE BLD-MCNC: 165 MG/DL (ref 74–99)
GLUCOSE SERPL-MCNC: 168 MG/DL (ref 74–99)
HCT VFR BLD AUTO: 37.1 % (ref 37–54)
HGB BLD-MCNC: 11.7 G/DL (ref 12.5–16.5)
IMM GRANULOCYTES # BLD AUTO: 0.04 K/UL (ref 0–0.58)
IMM GRANULOCYTES NFR BLD: 1 % (ref 0–5)
LYMPHOCYTES NFR BLD: 1.64 K/UL (ref 1.5–4)
LYMPHOCYTES RELATIVE PERCENT: 20 % (ref 20–42)
MAGNESIUM SERPL-MCNC: 1.4 MG/DL (ref 1.6–2.6)
MCH RBC QN AUTO: 25.4 PG (ref 26–35)
MCHC RBC AUTO-ENTMCNC: 31.5 G/DL (ref 32–34.5)
MCV RBC AUTO: 80.7 FL (ref 80–99.9)
MONOCYTES NFR BLD: 0.81 K/UL (ref 0.1–0.95)
MONOCYTES NFR BLD: 10 % (ref 2–12)
NEUTROPHILS NFR BLD: 67 % (ref 43–80)
NEUTS SEG NFR BLD: 5.44 K/UL (ref 1.8–7.3)
PLATELET # BLD AUTO: 238 K/UL (ref 130–450)
PMV BLD AUTO: 11.6 FL (ref 7–12)
POTASSIUM SERPL-SCNC: 3.6 MMOL/L (ref 3.5–5)
RBC # BLD AUTO: 4.6 M/UL (ref 3.8–5.8)
SODIUM SERPL-SCNC: 137 MMOL/L (ref 132–146)
WBC OTHER # BLD: 8.2 K/UL (ref 4.5–11.5)

## 2025-03-08 PROCEDURE — 82962 GLUCOSE BLOOD TEST: CPT

## 2025-03-08 PROCEDURE — 6370000000 HC RX 637 (ALT 250 FOR IP): Performed by: HOSPITALIST

## 2025-03-08 PROCEDURE — 99233 SBSQ HOSP IP/OBS HIGH 50: CPT | Performed by: INTERNAL MEDICINE

## 2025-03-08 PROCEDURE — G0378 HOSPITAL OBSERVATION PER HR: HCPCS

## 2025-03-08 PROCEDURE — 80048 BASIC METABOLIC PNL TOTAL CA: CPT

## 2025-03-08 PROCEDURE — 85025 COMPLETE CBC W/AUTO DIFF WBC: CPT

## 2025-03-08 PROCEDURE — 36415 COLL VENOUS BLD VENIPUNCTURE: CPT

## 2025-03-08 PROCEDURE — 83735 ASSAY OF MAGNESIUM: CPT

## 2025-03-08 PROCEDURE — 6370000000 HC RX 637 (ALT 250 FOR IP): Performed by: INTERNAL MEDICINE

## 2025-03-08 PROCEDURE — 6370000000 HC RX 637 (ALT 250 FOR IP): Performed by: CLINICAL NURSE SPECIALIST

## 2025-03-08 PROCEDURE — 2500000003 HC RX 250 WO HCPCS: Performed by: HOSPITALIST

## 2025-03-08 PROCEDURE — 2500000003 HC RX 250 WO HCPCS: Performed by: CLINICAL NURSE SPECIALIST

## 2025-03-08 RX ORDER — PANTOPRAZOLE SODIUM 40 MG/1
40 TABLET, DELAYED RELEASE ORAL
Qty: 30 TABLET | Refills: 3 | Status: SHIPPED | OUTPATIENT
Start: 2025-03-09

## 2025-03-08 RX ORDER — ASPIRIN 81 MG/1
81 TABLET ORAL DAILY
COMMUNITY
Start: 2025-03-09

## 2025-03-08 RX ORDER — LOSARTAN POTASSIUM 25 MG/1
25 TABLET ORAL DAILY
Qty: 30 TABLET | Refills: 3 | Status: SHIPPED | OUTPATIENT
Start: 2025-03-09

## 2025-03-08 RX ORDER — ATORVASTATIN CALCIUM 40 MG/1
40 TABLET, FILM COATED ORAL NIGHTLY
Qty: 30 TABLET | Refills: 3 | Status: SHIPPED | OUTPATIENT
Start: 2025-03-08

## 2025-03-08 RX ADMIN — SODIUM CHLORIDE, PRESERVATIVE FREE 10 ML: 5 INJECTION INTRAVENOUS at 09:44

## 2025-03-08 RX ADMIN — TICAGRELOR 90 MG: 90 TABLET ORAL at 09:46

## 2025-03-08 RX ADMIN — LOSARTAN POTASSIUM 25 MG: 25 TABLET, FILM COATED ORAL at 09:45

## 2025-03-08 RX ADMIN — DOCUSATE SODIUM 100 MG: 100 CAPSULE, LIQUID FILLED ORAL at 09:45

## 2025-03-08 RX ADMIN — OXYCODONE HYDROCHLORIDE AND ACETAMINOPHEN 1 TABLET: 5; 325 TABLET ORAL at 12:44

## 2025-03-08 RX ADMIN — OXYCODONE HYDROCHLORIDE AND ACETAMINOPHEN 1 TABLET: 5; 325 TABLET ORAL at 05:48

## 2025-03-08 RX ADMIN — FLUOXETINE HYDROCHLORIDE 40 MG: 20 CAPSULE ORAL at 09:46

## 2025-03-08 RX ADMIN — METOPROLOL SUCCINATE 25 MG: 25 TABLET, EXTENDED RELEASE ORAL at 09:45

## 2025-03-08 RX ADMIN — PANTOPRAZOLE SODIUM 40 MG: 40 TABLET, DELAYED RELEASE ORAL at 05:49

## 2025-03-08 RX ADMIN — PREGABALIN 150 MG: 75 CAPSULE ORAL at 09:45

## 2025-03-08 RX ADMIN — ASPIRIN 81 MG: 81 TABLET, COATED ORAL at 09:45

## 2025-03-08 RX ADMIN — CLONIDINE HYDROCHLORIDE 0.1 MG: 0.1 TABLET ORAL at 09:46

## 2025-03-08 RX ADMIN — OXYCODONE HYDROCHLORIDE AND ACETAMINOPHEN 1 TABLET: 5; 325 TABLET ORAL at 01:57

## 2025-03-08 ASSESSMENT — PAIN SCALES - GENERAL
PAINLEVEL_OUTOF10: 9
PAINLEVEL_OUTOF10: 9
PAINLEVEL_OUTOF10: 0

## 2025-03-08 ASSESSMENT — PAIN DESCRIPTION - DESCRIPTORS
DESCRIPTORS: ACHING;DULL;DISCOMFORT
DESCRIPTORS: ACHING

## 2025-03-08 ASSESSMENT — PAIN DESCRIPTION - LOCATION
LOCATION: BACK
LOCATION: BACK;LEG;BUTTOCKS

## 2025-03-08 ASSESSMENT — PAIN DESCRIPTION - ORIENTATION: ORIENTATION: RIGHT;LEFT;MID

## 2025-03-08 NOTE — PLAN OF CARE
Problem: Chronic Conditions and Co-morbidities  Goal: Patient's chronic conditions and co-morbidity symptoms are monitored and maintained or improved  Outcome: Adequate for Discharge     Problem: Safety - Adult  Goal: Free from fall injury  Outcome: Adequate for Discharge     Problem: Pain  Goal: Verbalizes/displays adequate comfort level or baseline comfort level  Outcome: Adequate for Discharge     Problem: ABCDS Injury Assessment  Goal: Absence of physical injury  3/8/2025 1214 by Melissa Olmos, RN  Outcome: Completed  3/8/2025 1212 by Melissa Olmos RN  Outcome: Adequate for Discharge     Problem: Cardiovascular - Adult  Goal: Maintains optimal cardiac output and hemodynamic stability  3/8/2025 1214 by Melissa Olmos, RN  Outcome: Completed  3/8/2025 1212 by Melissa Olmos RN  Outcome: Adequate for Discharge  Goal: Absence of cardiac dysrhythmias or at baseline  3/8/2025 1214 by Melissa Olmos, RN  Outcome: Completed  3/8/2025 1212 by Melissa Olmos RN  Outcome: Adequate for Discharge     Problem: Discharge Planning  Goal: Discharge to home or other facility with appropriate resources  3/8/2025 1214 by Melissa Olmos, RN  Outcome: Completed  3/8/2025 1212 by Melissa Olmos RN  Outcome: Adequate for Discharge

## 2025-03-08 NOTE — DISCHARGE INSTRUCTIONS
Cardiac Rehabilitation: Discharge instructions        Cardiac rehabilitation is a program for people who have a heart problem, such as a heart attack, coronary stent placed, heart failure, or a heart valve disease. The program includes exercise, lifestyle changes, education, and emotional support. Cardiac rehab can help you improve the quality of your life through better overall health. It can help you lose weight and feel better about yourself.    On your cardiac rehab team, you may have your doctor, a nurse specialist, an exercise physiologist, and a dietitian. They will design your cardiac rehab program specifically for you. You will learn how to reduce your risk for heart problems, how to manage stress, and how to eat a heart-healthy diet. By the end of the program, you will be ready to maintain a healthier lifestyle on your own.    Follow-up care is a key part of your treatment and safety. Be sure to make and go to all appointments, and call your doctor if you are having problems. It's also a good idea to know your test results and keep a list of the medicines you take.    Please call to schedule your first appointment once you have been cleared by your cardiologist to attend Phase II Outpatient Cardiac Rehabilitation.       Cardiac Rehabilitation Options:  [facility] Cardiac Rehab             [facility]  [address]                                                                                          Cardiology Services  [place], [place] [address]                                                                              [address]  P- [phone]                                                                                         [place] [address]  Hours: M/W/F 7am-7pm & T/Th 7am-12pm                                                  [phone]                                                                                                                           [phone]  [facility]  Cardiac Rehab  [address], [place]                                                                                                 [facility]  P- [phone]                                                                                         Cardiac Rehabilitation  Hours: M/W/F 7am-6pm                                                                                [address] [facility] [place] [address]  [facility]                                                          [phone]  Cardiac Rehabilitation                                                                                   [phone]  [address] [place] [address]                                                                                        [facility] [address]  [phone]                                                                                          Cardiac Rehabilitation  [phone]                                                                                          [address] [facility]. [address]                                                                                                                        [place] [address]                                                                                                                        [phone]                                                                                                                        [phone]

## 2025-03-08 NOTE — PLAN OF CARE
Problem: Chronic Conditions and Co-morbidities  Goal: Patient's chronic conditions and co-morbidity symptoms are monitored and maintained or improved  3/8/2025 1214 by Melissa Olmos RN  Outcome: Completed  3/8/2025 1212 by Melissa Olmos RN  Outcome: Adequate for Discharge     Problem: Safety - Adult  Goal: Free from fall injury  3/8/2025 1214 by Melissa Olmos, RN  Outcome: Completed  3/8/2025 1212 by Melissa Olmos RN  Outcome: Adequate for Discharge     Problem: Pain  Goal: Verbalizes/displays adequate comfort level or baseline comfort level  3/8/2025 1214 by Melissa Olmos, RN  Outcome: Completed  3/8/2025 1212 by Melissa Olmos RN  Outcome: Adequate for Discharge     Problem: ABCDS Injury Assessment  Goal: Absence of physical injury  3/8/2025 1214 by Melissa Olmos, RN  Outcome: Completed  3/8/2025 1212 by Melissa Olmos RN  Outcome: Adequate for Discharge     Problem: Cardiovascular - Adult  Goal: Maintains optimal cardiac output and hemodynamic stability  3/8/2025 1214 by Melissa Olmos, RN  Outcome: Completed  3/8/2025 1212 by Melissa Olmos RN  Outcome: Adequate for Discharge  Goal: Absence of cardiac dysrhythmias or at baseline  3/8/2025 1214 by Melissa Olmos, RN  Outcome: Completed  3/8/2025 1212 by Melissa Olmos, RN  Outcome: Adequate for Discharge     Problem: Discharge Planning  Goal: Discharge to home or other facility with appropriate resources  3/8/2025 1214 by Melissa Olmos, RN  Outcome: Completed  3/8/2025 1212 by Melissa Olmos RN  Outcome: Adequate for Discharge

## 2025-03-08 NOTE — PLAN OF CARE
Problem: Chronic Conditions and Co-morbidities  Goal: Patient's chronic conditions and co-morbidity symptoms are monitored and maintained or improved  Outcome: Progressing     Problem: Safety - Adult  Goal: Free from fall injury  Outcome: Progressing     Problem: Pain  Goal: Verbalizes/displays adequate comfort level or baseline comfort level  Outcome: Progressing     Problem: ABCDS Injury Assessment  Goal: Absence of physical injury  Outcome: Progressing     Problem: Cardiovascular - Adult  Goal: Maintains optimal cardiac output and hemodynamic stability  Outcome: Progressing     Problem: Discharge Planning  Goal: Discharge to home or other facility with appropriate resources  Outcome: Progressing

## 2025-03-08 NOTE — PLAN OF CARE
Problem: Chronic Conditions and Co-morbidities  Goal: Patient's chronic conditions and co-morbidity symptoms are monitored and maintained or improved  Outcome: Adequate for Discharge     Problem: Safety - Adult  Goal: Free from fall injury  Outcome: Adequate for Discharge     Problem: Pain  Goal: Verbalizes/displays adequate comfort level or baseline comfort level  Outcome: Adequate for Discharge     Problem: ABCDS Injury Assessment  Goal: Absence of physical injury  Outcome: Adequate for Discharge     Problem: Cardiovascular - Adult  Goal: Maintains optimal cardiac output and hemodynamic stability  Outcome: Adequate for Discharge  Goal: Absence of cardiac dysrhythmias or at baseline  Outcome: Adequate for Discharge     Problem: Discharge Planning  Goal: Discharge to home or other facility with appropriate resources  Outcome: Adequate for Discharge

## 2025-03-08 NOTE — CARE COORDINATION
3/8:  Update CM Note:  Walter sent message to attending that pt meets criteria to switch to iin patient.  Electronically signed by Sonali Berg RN on 3/8/2025 at 8:01 AM

## 2025-03-08 NOTE — PROGRESS NOTES
INPATIENT CARDIOLOGY FOLLOW-UP    Name: Javier Justin .    Age: 74 y.o.    Date of Admission: 3/5/2025  6:51 PM    Date of Service: 3/8/2025    Chief Complaint: Follow-up for CP    Interim History:  No new overnight cardiac complaints. Currently with no complaints of CP, SOB, palpitations, dizziness, or lightheadedness. SR on telemetry.  Status post PCI yesterday.  Feeling a lot better.  Ambulating without issues    Review of Systems:   Cardiac: As per HPI  General: No fever, chills  Pulmonary: As per HPI  HEENT: No visual disturbances, difficult swallowing  GI: No nausea, vomiting  Endocrine: No thyroid disease or DM  Musculoskeletal: JUAREZ x 4, no focal motor deficits  Skin: Intact, no rashes  Neuro/Psych: No headache or seizures    Problem List:  Patient Active Problem List   Diagnosis    Low back pain with right-sided sciatica    Type 2 diabetes mellitus with hyperglycemia, with long-term current use of insulin (HCC)    Essential hypertension    Pure hypercholesterolemia    Depression    Chronic low back pain    Sciatica    Acute pain of right knee    Chest pain    Class 2 severe obesity with serious comorbidity and body mass index (BMI) of 38.0 to 38.9 in adult    Acute kidney injury    Low back pain    Acute deep vein thrombosis (DVT) of popliteal vein of right lower extremity (HCC)    S/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    Pleurisy    Dizziness    Hypomagnesemia    Ascending aorta dilatation 4.6cm 3/2025    Microcytic anemia    Gastroesophageal reflux disease with esophagitis without hemorrhage       Allergies:  Allergies   Allergen Reactions    Hydrocodone      Other Reaction(s): Face Swelling, blurred vision, shaking       Current Medications:  Current Facility-Administered Medications   Medication Dose Route Frequency Provider Last Rate Last Admin    aspirin EC tablet 81 mg  81 mg Oral Daily Raul Noel DO        ticagrelor (BRILINTA) tablet 90 mg  90 mg Oral BID Raul Noel,       glucose chewable tablet 16 g  4 tablet Oral PRN Isac Jackson MD        dextrose bolus 10% 125 mL  125 mL IntraVENous PRN Isac Jackson MD        Or    dextrose bolus 10% 250 mL  250 mL IntraVENous PRN Isac Jackson MD        glucagon injection 1 mg  1 mg SubCUTAneous PRN Isac Jackson MD        dextrose 10 % infusion   IntraVENous Continuous PRN Isac Jackson MD        insulin glargine (LANTUS) injection vial 10 Units  10 Units SubCUTAneous Nightly Isac Jackson MD   10 Units at 03/07/25 2117    insulin lispro (HUMALOG,ADMELOG) injection vial 0-4 Units  0-4 Units SubCUTAneous 4x Daily AC & HS Isac Jackson MD   1 Units at 03/06/25 1627    labetalol (NORMODYNE;TRANDATE) injection 10 mg  10 mg IntraVENous Q4H PRN Isac Jackson MD        pantoprazole (PROTONIX) tablet 40 mg  40 mg Oral QAM AC Isac Jackson MD   40 mg at 03/08/25 0549    atorvastatin (LIPITOR) tablet 40 mg  40 mg Oral Nightly Carli Cain APRN - CNS   40 mg at 03/07/25 1944    losartan (COZAAR) tablet 25 mg  25 mg Oral Daily Carli Cain APRN - CNS   25 mg at 03/07/25 0720    cloNIDine (CATAPRES) tablet 0.1 mg  0.1 mg Oral BID Carli Cain APRN - CNS   0.1 mg at 03/07/25 1944    sodium chloride flush 0.9 % injection 5-40 mL  5-40 mL IntraVENous 2 times per day Carli Cain APRN - CNS   10 mL at 03/07/25 1116    sodium chloride flush 0.9 % injection 5-40 mL  5-40 mL IntraVENous PRN Carli Cain APRN - CNS        0.9 % sodium chloride infusion   IntraVENous PRN Carli Cain APRN - ANKIT        docusate sodium (COLACE) capsule 100 mg  100 mg Oral Daily Teddy John MD   100 mg at 03/07/25 1114      sodium chloride      dextrose      sodium chloride         Physical Exam:  /80   Pulse 60   Temp 97.8 °F (36.6 °C) (Oral)   Resp 18   Ht 1.753 m (5' 9\")   Wt 113.7 kg (250 lb 11.2 oz)   SpO2 98%   BMI 37.02 kg/m²   Wt Readings from Last 3 Encounters:   03/08/25 113.7 kg (250 lb 11.2 oz)   03/04/25 117.9 kg

## 2025-03-08 NOTE — PROGRESS NOTES
Subjective:    Chief complaint:    No new issues  Feels well       Objective:    /80   Pulse 60   Temp 97.8 °F (36.6 °C) (Oral)   Resp 18   Ht 1.753 m (5' 9\")   Wt 113.7 kg (250 lb 11.2 oz)   SpO2 98%   BMI 37.02 kg/m²   General : Awake ,alert,no distress.  Heart:  RRR, no murmurs, gallops, or rubs.  Lungs:  CTA bilaterally, no wheeze, rales or rhonchi  Abd: bowel sounds present, nontender, nondistended, no masses  Extrem:  No clubbing, cyanosis, or edema    CBC:   Lab Results   Component Value Date/Time    WBC 8.2 03/08/2025 06:21 AM    RBC 4.60 03/08/2025 06:21 AM    HGB 11.7 03/08/2025 06:21 AM    HCT 37.1 03/08/2025 06:21 AM    MCV 80.7 03/08/2025 06:21 AM    MCH 25.4 03/08/2025 06:21 AM    MCHC 31.5 03/08/2025 06:21 AM    RDW 16.4 03/08/2025 06:21 AM     03/08/2025 06:21 AM    MPV 11.6 03/08/2025 06:21 AM     BMP:    Lab Results   Component Value Date/Time     03/08/2025 06:21 AM    K 3.6 03/08/2025 06:21 AM    K 4.5 02/18/2023 05:50 PM     03/08/2025 06:21 AM    CO2 21 03/08/2025 06:21 AM    BUN 12 03/08/2025 06:21 AM    CREATININE 1.0 03/08/2025 06:21 AM    CALCIUM 9.6 03/08/2025 06:21 AM    GFRAA >60 08/04/2020 01:51 PM    LABGLOM 77 03/08/2025 06:21 AM    LABGLOM 58 04/17/2024 03:07 PM    GLUCOSE 168 03/08/2025 06:21 AM     PT/INR:    Lab Results   Component Value Date/Time    PROTIME 14.1 03/04/2025 05:35 AM    INR 1.3 03/04/2025 05:35 AM     Troponin:    Lab Results   Component Value Date/Time    TROPONINI <0.01 08/04/2020 01:51 PM       No results for input(s): \"LABURIN\" in the last 72 hours.  No results for input(s): \"BC\" in the last 72 hours.  No results for input(s): \"BLOODCULT2\" in the last 72 hours.      Current Facility-Administered Medications:     aspirin EC tablet 81 mg, 81 mg, Oral, Daily, Raul Noel DO, 81 mg at 03/08/25 0945    ticagrelor (BRILINTA) tablet 90 mg, 90 mg, Oral, BID, Raul Noel DO, 90 mg at 03/08/25 0946    oxyCODONE-acetaminophen

## 2025-03-08 NOTE — CONSULTS
Met with patient and discussed that their physician has ordered a referral to our outpatient Phase II Cardiac Rehabilitation program. Reviewed the benefits of cardiac rehabilitation based on their diagnosis and personal risk factors. Patient demonstrates strong interest in Cardiac Rehabilitation at this time.  Cardiac Rehabilitation brochure provided to patient/family. The Cardiac Rehabilitation Program has been provided the patient's referral information and pertinent patient details and history. The patient may call [facility] at [phone] for additional information or questions. Contact information for [facility] and other choices close to the patient's residence have been provided in the discharge instructions so that the patient may call and schedule an appointment when cleared by their physician. Thank you for the referral.

## 2025-03-08 NOTE — CARE COORDINATION
Social Work Transition of Care Planning:  Discharge order noted. SW spoke with nursing patient has no needs.  Electronically signed by DENNIS Cody on 3/8/2025 at 11:57 AM

## 2025-03-10 ENCOUNTER — TELEPHONE (OUTPATIENT)
Age: 75
End: 2025-03-10

## 2025-03-10 LAB
EKG ATRIAL RATE: 70 BPM
EKG P AXIS: 67 DEGREES
EKG P-R INTERVAL: 184 MS
EKG Q-T INTERVAL: 434 MS
EKG QRS DURATION: 94 MS
EKG QTC CALCULATION (BAZETT): 468 MS
EKG R AXIS: -25 DEGREES
EKG T AXIS: 42 DEGREES
EKG VENTRICULAR RATE: 70 BPM

## 2025-03-10 NOTE — TELEPHONE ENCOUNTER
Called to follow up Post PCI education. Left message.    Patient Education:  Post PCI, Risk Factors, Recovery, Prevention for Future, Lifestyle Changes     Mailed patient Education Packet to review information relating to current diagnosis, lifestyle Modification, and medications.   The packet contains information on the follow topics as they relate to the patient specifically.    1. CAD & Coronary Stents- A&P, cardiac cath, equipment used, heart anatomy  2. HTN- what is blood pressure, normals, how to manage BP/lifestyle changes  3. HLD-cholesterol, types, where it comes from, nutrition counseling  4. Diabetes Education- discussed cardiovascular effects, packet given for Hospital Education classes included if applicable.  5. Smoking cessation- nicotine effects on body and stents, how to quit, tobacco treatment center brochure included if applicable.  6. Active lifestyle suggestions- why activity and exercise are recommended, suggestions to start, Cardiac Rehabilitation benefits  7. Healthy eating- tips to help with Blood pressure and cholesterol management, eating regular meals, alternative food choices.  8. Stress management techniques  9. Post hospital follow up visit with PCP and cardiology encouraged  10. Medication Information- aspirin, P2Y12,, statins- what they are indicated for and importance of compliance.  Patient information card given to patient to keep in wallet or pocket for record keeping on medical history, doctor names and numbers, medication list.      Letter included with explanation of packet and my contact information and encouraged to call with questions they may have about information received.      Glenis Lopez RN  CATH/PCI/STEMI Navigator

## 2025-03-23 ENCOUNTER — APPOINTMENT (OUTPATIENT)
Dept: CT IMAGING | Age: 75
End: 2025-03-23
Payer: MEDICARE

## 2025-03-23 ENCOUNTER — HOSPITAL ENCOUNTER (EMERGENCY)
Age: 75
Discharge: HOME | End: 2025-03-23
Attending: STUDENT IN AN ORGANIZED HEALTH CARE EDUCATION/TRAINING PROGRAM
Payer: MEDICARE

## 2025-03-23 VITALS
DIASTOLIC BLOOD PRESSURE: 72 MMHG | HEART RATE: 65 BPM | WEIGHT: 270 LBS | RESPIRATION RATE: 18 BRPM | BODY MASS INDEX: 39.87 KG/M2 | OXYGEN SATURATION: 98 % | SYSTOLIC BLOOD PRESSURE: 137 MMHG | TEMPERATURE: 97.7 F

## 2025-03-23 DIAGNOSIS — M54.50 ACUTE ON CHRONIC LOW BACK PAIN: Primary | ICD-10-CM

## 2025-03-23 DIAGNOSIS — G89.29 ACUTE ON CHRONIC LOW BACK PAIN: Primary | ICD-10-CM

## 2025-03-23 PROCEDURE — 99284 EMERGENCY DEPT VISIT MOD MDM: CPT

## 2025-03-23 PROCEDURE — 96372 THER/PROPH/DIAG INJ SC/IM: CPT

## 2025-03-23 PROCEDURE — 72131 CT LUMBAR SPINE W/O DYE: CPT

## 2025-03-23 PROCEDURE — 6370000000 HC RX 637 (ALT 250 FOR IP): Performed by: STUDENT IN AN ORGANIZED HEALTH CARE EDUCATION/TRAINING PROGRAM

## 2025-03-23 PROCEDURE — 6360000002 HC RX W HCPCS: Performed by: STUDENT IN AN ORGANIZED HEALTH CARE EDUCATION/TRAINING PROGRAM

## 2025-03-23 RX ORDER — HYDROCODONE BITARTRATE AND ACETAMINOPHEN 5; 325 MG/1; MG/1
1 TABLET ORAL ONCE
Status: COMPLETED | OUTPATIENT
Start: 2025-03-23 | End: 2025-03-23

## 2025-03-23 RX ORDER — LIDOCAINE 4 G/G
1 PATCH TOPICAL DAILY
Status: DISCONTINUED | OUTPATIENT
Start: 2025-03-23 | End: 2025-03-23 | Stop reason: HOSPADM

## 2025-03-23 RX ORDER — CYCLOBENZAPRINE HCL 5 MG
5 TABLET ORAL 2 TIMES DAILY PRN
Qty: 10 TABLET | Refills: 0 | Status: SHIPPED | OUTPATIENT
Start: 2025-03-23 | End: 2025-04-02

## 2025-03-23 RX ORDER — ORPHENADRINE CITRATE 30 MG/ML
60 INJECTION INTRAMUSCULAR; INTRAVENOUS ONCE
Status: COMPLETED | OUTPATIENT
Start: 2025-03-23 | End: 2025-03-23

## 2025-03-23 RX ADMIN — ORPHENADRINE CITRATE 60 MG: 30 INJECTION, SOLUTION INTRAMUSCULAR; INTRAVENOUS at 08:23

## 2025-03-23 RX ADMIN — HYDROCODONE BITARTRATE AND ACETAMINOPHEN 1 TABLET: 5; 325 TABLET ORAL at 08:24

## 2025-03-23 RX ADMIN — HYDROCODONE BITARTRATE AND ACETAMINOPHEN 1 TABLET: 5; 325 TABLET ORAL at 11:30

## 2025-03-23 ASSESSMENT — PAIN DESCRIPTION - LOCATION
LOCATION: BACK

## 2025-03-23 ASSESSMENT — PAIN DESCRIPTION - ORIENTATION: ORIENTATION: LOWER

## 2025-03-23 ASSESSMENT — PAIN - FUNCTIONAL ASSESSMENT: PAIN_FUNCTIONAL_ASSESSMENT: 0-10

## 2025-03-23 ASSESSMENT — PAIN SCALES - GENERAL
PAINLEVEL_OUTOF10: 10
PAINLEVEL_OUTOF10: 9
PAINLEVEL_OUTOF10: 10
PAINLEVEL_OUTOF10: 9

## 2025-03-23 ASSESSMENT — PAIN DESCRIPTION - PAIN TYPE: TYPE: ACUTE PAIN

## 2025-03-23 ASSESSMENT — PAIN DESCRIPTION - DESCRIPTORS: DESCRIPTORS: SHOOTING;SHARP;DISCOMFORT

## 2025-03-23 NOTE — ED PROVIDER NOTES
Department of Emergency Medicine   ED  Provider Note  Admit Date/RoomTime: 3/23/2025  7:41 AM  ED Room: 08/08              [facility]     Javier Justin Sr. is a 74 y.o. male with a PMHx significant for  DM, HTN, HLD, chronic low back pain, GERD, DVT status post IVC filter  who presents for evaluation of back pain, beginning prior to arrival. The complaint has been persistent, moderate in severity, and worsened by nothing.   The patient states that he has longstanding history of back issues and problems.  Follows with Dr. Lugo has had multiple, 5 spinal surgeries.  He also follows with Dr. Turner at the pain clinic.  Notes that he had been on Percocet 7.5 mg he was taking 4 daily-wean him down to 1 a day by doing weekly tapers 4 to 3-2 and 1.  Notes that he was tapered down to 1 pill 2 days prior.  He feels that the pain has been getting much worse since they have done that.  The last 2 days he is getting pain that he cannot control.  Notes that the low back causing sciatica to flareup going down both legs.  Notes little bit of tingling in the bilateral feet, but no saddle paresthesias, urinary or fecal incontinence.  Denies any additional traumas.  He also does have a spine stimulator at [facility] which she notes they recently just worked on as well.  He has been taking Tylenol but has been unable to relieve his pain.  Of note he has hydrocodone listed as an allergy in his chart, states that years ago he had some Vicodin which made him talk nonstop, denies any facial swelling.  Notes that he has been on hydrocodone, medical multiple times.     Review of Systems   Constitutional:  Positive for fatigue. Negative for chills and fever.   HENT:  Negative for ear pain, sinus pressure and sore throat.    Eyes:  Negative for pain, discharge and redness.   Respiratory:  Negative for cough, shortness of breath and wheezing.    Cardiovascular:  Negative for chest pain.   Gastrointestinal:  Negative for abdominal pain, diarrhea,

## 2025-03-23 NOTE — ED NOTES
[facility] notified of consult for Dr Jomar Lugo in regards to CT concerning for possibility of   loosening hardware

## 2025-03-23 NOTE — DISCHARGE INSTRUCTIONS
CT LUMBAR SPINE WO CONTRAST   Final Result   1. There is a persistent zone of lucency along the hardware at L5-S1 in close   clinical correlation is recommended   2. Status post laminectomy of L1 through S1 with decompression and fusion but   no definite signs of hardware failure radiographically.   3. There is a disc osteophyte complex at L2-3 and if there is concern for   underlying spinal stenosis CT with myelography or MRI would be of increased   sensitivity.           Follow-up with your pain management specialist.  Follow-up with your orthopedic surgeon.  If symptoms worsen or concern arises please return for reevaluation peer

## 2025-03-24 ENCOUNTER — OFFICE VISIT (OUTPATIENT)
Dept: ENDOCRINOLOGY | Age: 75
End: 2025-03-24
Payer: MEDICARE

## 2025-03-24 VITALS
DIASTOLIC BLOOD PRESSURE: 66 MMHG | WEIGHT: 268 LBS | HEART RATE: 83 BPM | TEMPERATURE: 98.4 F | OXYGEN SATURATION: 96 % | SYSTOLIC BLOOD PRESSURE: 106 MMHG | RESPIRATION RATE: 18 BRPM | HEIGHT: 69 IN | BODY MASS INDEX: 39.69 KG/M2

## 2025-03-24 DIAGNOSIS — E11.65 TYPE 2 DIABETES MELLITUS WITH HYPERGLYCEMIA, WITH LONG-TERM CURRENT USE OF INSULIN (HCC): Primary | ICD-10-CM

## 2025-03-24 DIAGNOSIS — Z79.4 TYPE 2 DIABETES MELLITUS WITH HYPERGLYCEMIA, WITH LONG-TERM CURRENT USE OF INSULIN (HCC): Primary | ICD-10-CM

## 2025-03-24 PROCEDURE — 2022F DILAT RTA XM EVC RTNOPTHY: CPT | Performed by: INTERNAL MEDICINE

## 2025-03-24 PROCEDURE — 99214 OFFICE O/P EST MOD 30 MIN: CPT | Performed by: INTERNAL MEDICINE

## 2025-03-24 PROCEDURE — 1159F MED LIST DOCD IN RCRD: CPT | Performed by: INTERNAL MEDICINE

## 2025-03-24 PROCEDURE — G2211 COMPLEX E/M VISIT ADD ON: HCPCS | Performed by: INTERNAL MEDICINE

## 2025-03-24 PROCEDURE — 1036F TOBACCO NON-USER: CPT | Performed by: INTERNAL MEDICINE

## 2025-03-24 PROCEDURE — 3074F SYST BP LT 130 MM HG: CPT | Performed by: INTERNAL MEDICINE

## 2025-03-24 PROCEDURE — G8427 DOCREV CUR MEDS BY ELIG CLIN: HCPCS | Performed by: INTERNAL MEDICINE

## 2025-03-24 PROCEDURE — 1123F ACP DISCUSS/DSCN MKR DOCD: CPT | Performed by: INTERNAL MEDICINE

## 2025-03-24 PROCEDURE — 3078F DIAST BP <80 MM HG: CPT | Performed by: INTERNAL MEDICINE

## 2025-03-24 PROCEDURE — G8417 CALC BMI ABV UP PARAM F/U: HCPCS | Performed by: INTERNAL MEDICINE

## 2025-03-24 PROCEDURE — 3017F COLORECTAL CA SCREEN DOC REV: CPT | Performed by: INTERNAL MEDICINE

## 2025-03-24 PROCEDURE — 3052F HG A1C>EQUAL 8.0%<EQUAL 9.0%: CPT | Performed by: INTERNAL MEDICINE

## 2025-03-24 RX ORDER — TIRZEPATIDE 2.5 MG/.5ML
2.5 INJECTION, SOLUTION SUBCUTANEOUS WEEKLY
Qty: 2 ML | Refills: 0 | Status: SHIPPED | OUTPATIENT
Start: 2025-03-24

## 2025-03-24 RX ORDER — TIRZEPATIDE 5 MG/.5ML
5 INJECTION, SOLUTION SUBCUTANEOUS WEEKLY
Qty: 2 ML | Refills: 11 | Status: SHIPPED | OUTPATIENT
Start: 2025-04-24

## 2025-03-24 NOTE — PROGRESS NOTES
[facility]  [address]  [facility] [address]  Dept: [phone]  Loc: [phone]   Date of Service: 3/24/2025  Primary Care Physician: Raúl Farias MD  Referring physician: No ref. provider found  Provider: Adis Ferguson MD    Reason for the visit:  Diabetes type 2    History of Present Illness:  The history is provided by the patient. No  was used. Accuracy of the patient data is excellent.  Javier Justin Sr. is a very pleasant 74 y.o. male seen today for diabetes management     The patient was recently admitted to the hospital because of steroids induced hyperglycemia.  He was discharged on Lantus 26u nightly, Humalog 9u with meals plus the MDSS and currently doing very well on this regimen.  The patient has been checking glucose level AC at bedtime since the hospital discharge and most readings in range.  He denies any hypoglycemic episodes.  Since the recent hospitalization, the patient has been compliant with following diabetic diet as instructed.  He also denies any hypoglycemic episodes.  The patient is due for diabetic eye exam.  Lab Results   Component Value Date/Time    LABA1C 8.4 03/06/2025 04:52 AM    LABA1C 7.6 09/30/2024 12:00 AM    LABA1C 7.5 05/28/2024 04:26 AM       PAST MEDICAL HISTORY   Past Medical History:   Diagnosis Date    Anxiety     Arthritis     Back pain     Diabetes mellitus (HCC)     History of blood transfusion     Hx of blood clots     Hyperlipidemia     Hypertension     Sciatica        PAST SURGICAL HISTORY   Past Surgical History:   Procedure Laterality Date    BACK SURGERY      CARDIAC PROCEDURE N/A 3/7/2025    Left heart cath / coronary angiography performed by Raul Noel DO at [facility]    CARDIAC PROCEDURE N/A 3/7/2025    Fractional flow reserve (FFR) performed by Raul Noel DO at [facility]    CARDIAC PROCEDURE N/A 3/7/2025

## 2025-04-10 ENCOUNTER — OFFICE VISIT (OUTPATIENT)
Dept: CARDIOLOGY CLINIC | Age: 75
End: 2025-04-10

## 2025-04-10 VITALS
HEART RATE: 76 BPM | OXYGEN SATURATION: 97 % | SYSTOLIC BLOOD PRESSURE: 114 MMHG | DIASTOLIC BLOOD PRESSURE: 64 MMHG | RESPIRATION RATE: 18 BRPM | TEMPERATURE: 96.4 F | HEIGHT: 69 IN | WEIGHT: 272.2 LBS | BODY MASS INDEX: 40.32 KG/M2

## 2025-04-10 DIAGNOSIS — I25.10 CORONARY ARTERY DISEASE DUE TO LIPID RICH PLAQUE: ICD-10-CM

## 2025-04-10 DIAGNOSIS — I25.83 CORONARY ARTERY DISEASE DUE TO LIPID RICH PLAQUE: ICD-10-CM

## 2025-04-10 DIAGNOSIS — Z98.61 HISTORY OF PERCUTANEOUS CORONARY INTERVENTION: ICD-10-CM

## 2025-04-10 DIAGNOSIS — I10 ESSENTIAL HYPERTENSION: Primary | ICD-10-CM

## 2025-04-10 DIAGNOSIS — I25.10 CORONARY ARTERY DISEASE INVOLVING NATIVE HEART WITHOUT ANGINA PECTORIS, UNSPECIFIED VESSEL OR LESION TYPE: Primary | ICD-10-CM

## 2025-04-10 RX ORDER — HYDROCODONE BITARTRATE AND ACETAMINOPHEN 10; 325 MG/1; MG/1
1 TABLET ORAL EVERY 6 HOURS PRN
COMMUNITY

## 2025-04-11 NOTE — PROGRESS NOTES
Javier ASHLEE Margoth Lopez.  1950  Date of Service: 4/11/2025    Reason for Consultation:    We were asked to see Javier ASHLEE Margoth Lopez. by Raúl Velasquez MD  regarding coronary artery disease.    History of Chief Complaint:   This is a 74 y.o. male is being seen today for initial hospital follow-up.  With a history of ascending aortic aneurysm, diabetes, hypertension, hypercholesterolemia, obesity, possible sleep apnea, DVT, prolonged QT.  He was seen in the [facility] for chest discomfort and elevated coronary calcium score.  He was transferred downtown and underwent cardiac catheterization with stenting of the LAD on 3/7/2025.  He states that he goes upstairs, does household chores, and go shopping.  He has morbid obesity and is chronically winded but he states that this is not changed for years and remains at his baseline.  He has chronic back spasms with pains between his shoulder blades that is not associated with physical activities.  This has been present for at least 6 to 8 months.  He denies any chest discomfort, orthopnea/PND.  He denies any palpitations, or syncope, or near syncope.    REVIEW OF SYSTEMS:  As above.  See patient questionair for further review of systems.     CURRENT MEDICATIONS:  Current Outpatient Medications   Medication Sig Dispense Refill    HYDROcodone-acetaminophen (NORCO)  MG per tablet Take 1 tablet by mouth every 6 hours as needed for Pain. Max Daily Amount: 4 tablets      Tirzepatide (MOUNJARO) 2.5 MG/0.5ML SOAJ Inject 2.5 mg into the skin once a week 2 mL 0    aspirin 81 MG EC tablet Take 1 tablet by mouth daily      atorvastatin (LIPITOR) 40 MG tablet Take 1 tablet by mouth nightly 30 tablet 3    losartan (COZAAR) 25 MG tablet Take 1 tablet by mouth daily 30 tablet 3    ticagrelor (BRILINTA) 90 MG TABS tablet Take 1 tablet by mouth 2 times daily 60 tablet 3    pantoprazole (PROTONIX) 40 MG tablet Take 1 tablet by mouth every morning (before

## 2025-04-28 ENCOUNTER — HOSPITAL ENCOUNTER (OUTPATIENT)
Dept: CARDIAC REHAB | Age: 75
Setting detail: THERAPIES SERIES
Discharge: HOME | End: 2025-04-28
Attending: INTERNAL MEDICINE
Payer: MEDICARE

## 2025-04-28 PROCEDURE — 93797 PHYS/QHP OP CAR RHAB WO ECG: CPT

## 2025-04-28 PROCEDURE — 93798 PHYS/QHP OP CAR RHAB W/ECG: CPT

## 2025-04-28 ASSESSMENT — PATIENT HEALTH QUESTIONNAIRE - PHQ9
SUM OF ALL RESPONSES TO PHQ QUESTIONS 1-9: 5
5. POOR APPETITE OR OVEREATING: NEARLY EVERY DAY
7. TROUBLE CONCENTRATING ON THINGS, SUCH AS READING THE NEWSPAPER OR WATCHING TELEVISION: NOT AT ALL
8. MOVING OR SPEAKING SO SLOWLY THAT OTHER PEOPLE COULD HAVE NOTICED. OR THE OPPOSITE, BEING SO FIGETY OR RESTLESS THAT YOU HAVE BEEN MOVING AROUND A LOT MORE THAN USUAL: NOT AT ALL
10. IF YOU CHECKED OFF ANY PROBLEMS, HOW DIFFICULT HAVE THESE PROBLEMS MADE IT FOR YOU TO DO YOUR WORK, TAKE CARE OF THINGS AT HOME, OR GET ALONG WITH OTHER PEOPLE: NOT DIFFICULT AT ALL
6. FEELING BAD ABOUT YOURSELF - OR THAT YOU ARE A FAILURE OR HAVE LET YOURSELF OR YOUR FAMILY DOWN: SEVERAL DAYS
SUM OF ALL RESPONSES TO PHQ QUESTIONS 1-9: 5
SUM OF ALL RESPONSES TO PHQ QUESTIONS 1-9: 5
3. TROUBLE FALLING OR STAYING ASLEEP: SEVERAL DAYS
SUM OF ALL RESPONSES TO PHQ QUESTIONS 1-9: 5
1. LITTLE INTEREST OR PLEASURE IN DOING THINGS: NOT AT ALL
4. FEELING TIRED OR HAVING LITTLE ENERGY: NOT AT ALL
9. THOUGHTS THAT YOU WOULD BE BETTER OFF DEAD, OR OF HURTING YOURSELF: NOT AT ALL
2. FEELING DOWN, DEPRESSED OR HOPELESS: NOT AT ALL

## 2025-04-28 NOTE — CONSULTS
Nutrition Assessment & Medical Nutrition Therapy      Date: 4/28/25    Time: 11:00 am   Patient Name: Javier Justin Sr.   PCP - General: Raúl Farias MD   Fax: [phone]   Reason for Visit:  Pt is a 74 y.o. male s/p PCI on 3/7/25 with PMHx of DM, CAD, HTN, HLD, multiple back surgeries with fusion.   Pt seen in cardiac rehab for initial nutrition assessment and diet counseling.      Goals:  Eat a diet rich in a variety of fruits, vegetables, whole grains, lean proteins, and healthy fats with balance and portion control to support heart health - Pt to increase vegetable intake to 3 servings per day.   Reduce sodium (2000 mg/day) and saturated fats (<7% of total calorie intake/day) through increased awareness of foods to limit and applying label reading guidelines for healthier options - Pt to begin limits with salt shaker and incorporate more herbs and spices for flavoring.   Understand methods for portion control to aid in a safe wt loss of 1-2 lb per week   Pt to include controlled consistent carbohydrate intake throughout the day and include an evening snack to aid in glycemic control.    Current Eating Pattern:  Pt includes 3 meals + snacks between breakfast and lunch.  Does not eat after 6pm.   Wakes at 4am - coffee and 3 seasoned rice cakes or egg, ham, pepperjack cheese sandwich on wheat  Snack - nuts, seeds, fruit, PB  Lunch (11am) - Cheese and turkey roll up + large fruit plate  Dinner (5-6 pm) - Baked chicken, beef, pork or fish with red skins with parmesan cheese and cruz bits and yogurt with fruit on bottom.  Includes a vegetable most nights.  Snack (1-2 am) - power bar - eats d/t alert of low BG level via CGM  Adds lots of salt to food  Drinks a gallon of 2% milk a week + 6 bottles of water a day.      Allergies/Food Sensitivities:   Pt reports a sensitivity to basil - states he breaks out when eaten    Based on pt reported diet hx/diet recall and discussion, there are no cultural or

## 2025-04-30 ENCOUNTER — HOSPITAL ENCOUNTER (OUTPATIENT)
Dept: CARDIAC REHAB | Age: 75
Setting detail: THERAPIES SERIES
Discharge: HOME | End: 2025-04-30
Payer: MEDICARE

## 2025-04-30 PROCEDURE — 93798 PHYS/QHP OP CAR RHAB W/ECG: CPT

## 2025-05-02 ENCOUNTER — HOSPITAL ENCOUNTER (OUTPATIENT)
Dept: CARDIAC REHAB | Age: 75
Setting detail: THERAPIES SERIES
Discharge: HOME | End: 2025-05-02
Payer: MEDICARE

## 2025-05-02 PROCEDURE — 93798 PHYS/QHP OP CAR RHAB W/ECG: CPT

## 2025-05-04 ENCOUNTER — HOSPITAL ENCOUNTER (EMERGENCY)
Age: 75
Discharge: HOME | End: 2025-05-04
Payer: MEDICARE

## 2025-05-04 ENCOUNTER — APPOINTMENT (OUTPATIENT)
Dept: CT IMAGING | Age: 75
End: 2025-05-04
Payer: MEDICARE

## 2025-05-04 VITALS
WEIGHT: 272.2 LBS | RESPIRATION RATE: 18 BRPM | BODY MASS INDEX: 40.2 KG/M2 | HEART RATE: 81 BPM | OXYGEN SATURATION: 97 % | DIASTOLIC BLOOD PRESSURE: 80 MMHG | TEMPERATURE: 97.4 F | SYSTOLIC BLOOD PRESSURE: 124 MMHG

## 2025-05-04 DIAGNOSIS — M54.50 ACUTE EXACERBATION OF CHRONIC LOW BACK PAIN: Primary | ICD-10-CM

## 2025-05-04 DIAGNOSIS — M54.17 LUMBOSACRAL RADICULOPATHY: ICD-10-CM

## 2025-05-04 DIAGNOSIS — G89.29 ACUTE EXACERBATION OF CHRONIC LOW BACK PAIN: Primary | ICD-10-CM

## 2025-05-04 PROCEDURE — 96372 THER/PROPH/DIAG INJ SC/IM: CPT

## 2025-05-04 PROCEDURE — 6360000002 HC RX W HCPCS

## 2025-05-04 PROCEDURE — 99284 EMERGENCY DEPT VISIT MOD MDM: CPT

## 2025-05-04 PROCEDURE — 72131 CT LUMBAR SPINE W/O DYE: CPT

## 2025-05-04 PROCEDURE — 6370000000 HC RX 637 (ALT 250 FOR IP)

## 2025-05-04 RX ORDER — ORPHENADRINE CITRATE 30 MG/ML
60 INJECTION INTRAMUSCULAR; INTRAVENOUS ONCE
Status: COMPLETED | OUTPATIENT
Start: 2025-05-04 | End: 2025-05-04

## 2025-05-04 RX ORDER — KETOROLAC TROMETHAMINE 30 MG/ML
30 INJECTION, SOLUTION INTRAMUSCULAR; INTRAVENOUS ONCE
Status: COMPLETED | OUTPATIENT
Start: 2025-05-04 | End: 2025-05-04

## 2025-05-04 RX ORDER — HYDROCODONE BITARTRATE AND ACETAMINOPHEN 5; 325 MG/1; MG/1
1 TABLET ORAL ONCE
Status: COMPLETED | OUTPATIENT
Start: 2025-05-04 | End: 2025-05-04

## 2025-05-04 RX ORDER — CYCLOBENZAPRINE HCL 10 MG
10 TABLET ORAL 3 TIMES DAILY PRN
Qty: 12 TABLET | Refills: 0 | Status: SHIPPED | OUTPATIENT
Start: 2025-05-04 | End: 2025-05-08

## 2025-05-04 RX ADMIN — ORPHENADRINE CITRATE 60 MG: 30 INJECTION, SOLUTION INTRAMUSCULAR; INTRAVENOUS at 14:31

## 2025-05-04 RX ADMIN — KETOROLAC TROMETHAMINE 30 MG: 30 INJECTION, SOLUTION INTRAMUSCULAR at 14:29

## 2025-05-04 RX ADMIN — HYDROCODONE BITARTRATE AND ACETAMINOPHEN 1 TABLET: 5; 325 TABLET ORAL at 14:31

## 2025-05-04 ASSESSMENT — PAIN DESCRIPTION - LOCATION
LOCATION: LEG

## 2025-05-04 ASSESSMENT — PAIN SCALES - GENERAL
PAINLEVEL_OUTOF10: 10
PAINLEVEL_OUTOF10: 4
PAINLEVEL_OUTOF10: 10

## 2025-05-04 ASSESSMENT — PAIN DESCRIPTION - DESCRIPTORS
DESCRIPTORS: ACHING;DISCOMFORT;TENDER
DESCRIPTORS: SHARP;DISCOMFORT
DESCRIPTORS: ACHING;TENDER;DISCOMFORT;SORE

## 2025-05-04 ASSESSMENT — PAIN - FUNCTIONAL ASSESSMENT
PAIN_FUNCTIONAL_ASSESSMENT: PREVENTS OR INTERFERES SOME ACTIVE ACTIVITIES AND ADLS
PAIN_FUNCTIONAL_ASSESSMENT: 0-10
PAIN_FUNCTIONAL_ASSESSMENT: PREVENTS OR INTERFERES WITH ALL ACTIVE AND SOME PASSIVE ACTIVITIES

## 2025-05-04 ASSESSMENT — PAIN DESCRIPTION - PAIN TYPE
TYPE: CHRONIC PAIN
TYPE: ACUTE PAIN

## 2025-05-04 ASSESSMENT — PAIN DESCRIPTION - ORIENTATION
ORIENTATION: LEFT

## 2025-05-04 ASSESSMENT — PAIN DESCRIPTION - ONSET: ONSET: SUDDEN

## 2025-05-04 NOTE — ED PROVIDER NOTES
Independent NAMRATA Visit          [facility] EMERGENCY DEPARTMENT  EMERGENCY DEPARTMENT ENCOUNTER        Pt Name: Javier Justin Sr.  MRN: [id number]  Birthdate 1950  Date of evaluation: 5/4/2025  Provider: SHIRA Garcia - CNP  PCP: Raúl Farias MD  Note Started: 2:54 PM EDT 5/4/25      CHIEF COMPLAINT       Chief Complaint   Patient presents with    Leg Pain     Left leg gave out on him causing him to fall onto the couch.  Pain left leg.        HISTORY OF PRESENT ILLNESS: 1 or more Elements   History from : Patient and chart review.  Limitations to history : None    Javier Justin Sr. is a 74 y.o. male with a history of type 2 diabetes mellitus, hypertension, hyperlipidemia, GERD, DVT post IVC filter, and chronic low back pain who presents to the emergency department for an exacerbation of \"sciatica.\" He arrives with his wife. Has a history of underlying low back pain with sciatica and extensive lumbar and sacral surgeries in the past.  His most recent surgery was in October 2024.  He follows with Dr. Lugo and is in pain management.  He last took Norco at 10 AM without relief.  He reports that he is having radiation of \"electrical\" pain diffusely to his legs and his left leg gave out causing him to fall onto the couch.  He recently started cardiac rehabilitation and has been doing more activity than normal.  He feels this has flared it up.  There is been no new injury or trauma. Since onset the symptoms have been waxing and waning and is moderate-to-severe.  He has associated signs & symptoms of tingling in both legs, which he has had in the past.   He denies any new weakness, tingling or paresthesias, recent back injection, recent spinal surgery, recent spinal/chiropractic manipulation, history of IVDA, fever, abdominal pain, bladder retention, hematuria, dysuria, urinary urgency, urinary frequency, bladder urgency, bowel urgency, saddle paresthesias , sacral numbness, burning, or

## 2025-05-05 ENCOUNTER — TELEPHONE (OUTPATIENT)
Dept: CARDIAC REHAB | Age: 75
End: 2025-05-05

## 2025-05-05 ENCOUNTER — HOSPITAL ENCOUNTER (OUTPATIENT)
Dept: CARDIAC REHAB | Age: 75
Setting detail: THERAPIES SERIES
End: 2025-05-05
Payer: MEDICARE

## 2025-05-05 NOTE — TELEPHONE ENCOUNTER
Javier phoned in to let us know that he was recently in the hospital. He states that his legs weren't working right and that he had a couple injections in them. He hopes to return to cardiac rehab on Wednesday, May 7th, 2025.

## 2025-05-12 ENCOUNTER — HOSPITAL ENCOUNTER (OUTPATIENT)
Dept: CARDIAC REHAB | Age: 75
Setting detail: THERAPIES SERIES
Discharge: HOME | End: 2025-05-12
Payer: MEDICARE

## 2025-05-12 PROCEDURE — 93798 PHYS/QHP OP CAR RHAB W/ECG: CPT

## 2025-05-17 ENCOUNTER — APPOINTMENT (OUTPATIENT)
Dept: CT IMAGING | Age: 75
End: 2025-05-17
Payer: MEDICARE

## 2025-05-17 ENCOUNTER — HOSPITAL ENCOUNTER (EMERGENCY)
Age: 75
Discharge: HOME | End: 2025-05-18
Attending: EMERGENCY MEDICINE
Payer: MEDICARE

## 2025-05-17 DIAGNOSIS — E86.0 DEHYDRATION: ICD-10-CM

## 2025-05-17 DIAGNOSIS — I10 ESSENTIAL HYPERTENSION: ICD-10-CM

## 2025-05-17 DIAGNOSIS — K59.00 CONSTIPATION, UNSPECIFIED CONSTIPATION TYPE: Primary | ICD-10-CM

## 2025-05-17 DIAGNOSIS — E83.42 HYPOMAGNESEMIA: ICD-10-CM

## 2025-05-17 LAB
ERYTHROCYTE [DISTWIDTH] IN BLOOD BY AUTOMATED COUNT: 15.6 % (ref 11.5–15)
HCT VFR BLD AUTO: 32.5 % (ref 37–54)
HGB BLD-MCNC: 10.7 G/DL (ref 12.5–16.5)
MCH RBC QN AUTO: 26.6 PG (ref 26–35)
MCHC RBC AUTO-ENTMCNC: 32.9 G/DL (ref 32–34.5)
MCV RBC AUTO: 80.8 FL (ref 80–99.9)
PLATELET # BLD AUTO: 193 K/UL (ref 130–450)
PMV BLD AUTO: 10.8 FL (ref 7–12)
RBC # BLD AUTO: 4.02 M/UL (ref 3.8–5.8)
WBC OTHER # BLD: 7.3 K/UL (ref 4.5–11.5)

## 2025-05-17 PROCEDURE — 74176 CT ABD & PELVIS W/O CONTRAST: CPT

## 2025-05-17 PROCEDURE — 83735 ASSAY OF MAGNESIUM: CPT

## 2025-05-17 PROCEDURE — 85027 COMPLETE CBC AUTOMATED: CPT

## 2025-05-17 PROCEDURE — 81001 URINALYSIS AUTO W/SCOPE: CPT

## 2025-05-17 PROCEDURE — 82150 ASSAY OF AMYLASE: CPT

## 2025-05-17 PROCEDURE — 83605 ASSAY OF LACTIC ACID: CPT

## 2025-05-17 PROCEDURE — 99284 EMERGENCY DEPT VISIT MOD MDM: CPT

## 2025-05-17 PROCEDURE — 80053 COMPREHEN METABOLIC PANEL: CPT

## 2025-05-17 PROCEDURE — 83690 ASSAY OF LIPASE: CPT

## 2025-05-17 ASSESSMENT — PAIN SCALES - GENERAL: PAINLEVEL_OUTOF10: 8

## 2025-05-17 ASSESSMENT — PAIN DESCRIPTION - DESCRIPTORS: DESCRIPTORS: ACHING;CRAMPING

## 2025-05-17 ASSESSMENT — PAIN DESCRIPTION - PAIN TYPE: TYPE: ACUTE PAIN

## 2025-05-17 ASSESSMENT — PAIN DESCRIPTION - ONSET: ONSET: ON-GOING

## 2025-05-17 ASSESSMENT — PAIN - FUNCTIONAL ASSESSMENT: PAIN_FUNCTIONAL_ASSESSMENT: 0-10

## 2025-05-17 ASSESSMENT — PAIN DESCRIPTION - LOCATION: LOCATION: ABDOMEN

## 2025-05-17 ASSESSMENT — PAIN DESCRIPTION - FREQUENCY: FREQUENCY: CONTINUOUS

## 2025-05-18 VITALS
TEMPERATURE: 97.9 F | DIASTOLIC BLOOD PRESSURE: 67 MMHG | RESPIRATION RATE: 20 BRPM | HEART RATE: 95 BPM | SYSTOLIC BLOOD PRESSURE: 123 MMHG | OXYGEN SATURATION: 95 % | HEIGHT: 69 IN | BODY MASS INDEX: 40.29 KG/M2 | WEIGHT: 272 LBS

## 2025-05-18 PROBLEM — K59.00 CONSTIPATION: Status: ACTIVE | Noted: 2025-05-18

## 2025-05-18 PROBLEM — E86.0 DEHYDRATION: Status: ACTIVE | Noted: 2025-05-18

## 2025-05-18 LAB
ALBUMIN SERPL-MCNC: 3.8 G/DL (ref 3.5–5.2)
ALP SERPL-CCNC: 110 U/L (ref 40–129)
ALT SERPL-CCNC: 25 U/L (ref 0–40)
AMYLASE SERPL-CCNC: 87 U/L (ref 20–100)
ANION GAP SERPL CALCULATED.3IONS-SCNC: 11 MMOL/L (ref 7–16)
AST SERPL-CCNC: 52 U/L (ref 0–39)
BILIRUB SERPL-MCNC: 0.9 MG/DL (ref 0–1.2)
BILIRUB UR QL STRIP: NEGATIVE
BUN SERPL-MCNC: 19 MG/DL (ref 6–23)
CALCIUM SERPL-MCNC: 9.7 MG/DL (ref 8.6–10.2)
CHLORIDE SERPL-SCNC: 101 MMOL/L (ref 98–107)
CLARITY UR: CLEAR
CO2 SERPL-SCNC: 25 MMOL/L (ref 22–29)
COLOR UR: YELLOW
CREAT SERPL-MCNC: 1.1 MG/DL (ref 0.7–1.2)
GFR, ESTIMATED: 75 ML/MIN/1.73M2
GLUCOSE SERPL-MCNC: 198 MG/DL (ref 74–99)
GLUCOSE UR STRIP-MCNC: 250 MG/DL
HGB UR QL STRIP.AUTO: NEGATIVE
KETONES UR STRIP-MCNC: NEGATIVE MG/DL
LACTATE BLDV-SCNC: 2.3 MMOL/L (ref 0.5–2.2)
LEUKOCYTE ESTERASE UR QL STRIP: NEGATIVE
LIPASE SERPL-CCNC: 10 U/L (ref 13–60)
MAGNESIUM SERPL-MCNC: 1.4 MG/DL (ref 1.6–2.6)
NITRITE UR QL STRIP: NEGATIVE
PH UR STRIP: 5.5 [PH] (ref 5–8)
POTASSIUM SERPL-SCNC: 5.1 MMOL/L (ref 3.5–5)
PROT SERPL-MCNC: 7.2 G/DL (ref 6.4–8.3)
PROT UR STRIP-MCNC: NEGATIVE MG/DL
RBC #/AREA URNS HPF: ABNORMAL /HPF
SODIUM SERPL-SCNC: 137 MMOL/L (ref 132–146)
SP GR UR STRIP: 1.02 (ref 1–1.03)
UROBILINOGEN UR STRIP-ACNC: 0.2 EU/DL (ref 0–1)
WBC #/AREA URNS HPF: ABNORMAL /HPF

## 2025-05-18 PROCEDURE — 96372 THER/PROPH/DIAG INJ SC/IM: CPT

## 2025-05-18 PROCEDURE — 6370000000 HC RX 637 (ALT 250 FOR IP): Performed by: EMERGENCY MEDICINE

## 2025-05-18 PROCEDURE — 6360000002 HC RX W HCPCS: Performed by: EMERGENCY MEDICINE

## 2025-05-18 RX ORDER — DOCUSATE SODIUM 100 MG/1
100 CAPSULE, LIQUID FILLED ORAL 2 TIMES DAILY
Qty: 60 CAPSULE | Refills: 0 | Status: SHIPPED | OUTPATIENT
Start: 2025-05-18 | End: 2025-06-17

## 2025-05-18 RX ORDER — MAGNESIUM OXIDE 420 MG/1
1 TABLET ORAL DAILY
Qty: 4 TABLET | Refills: 0 | Status: SHIPPED | OUTPATIENT
Start: 2025-05-18

## 2025-05-18 RX ORDER — LANOLIN ALCOHOL/MO/W.PET/CERES
400 CREAM (GRAM) TOPICAL ONCE
Status: COMPLETED | OUTPATIENT
Start: 2025-05-18 | End: 2025-05-18

## 2025-05-18 RX ORDER — KETOROLAC TROMETHAMINE 30 MG/ML
30 INJECTION, SOLUTION INTRAMUSCULAR; INTRAVENOUS ONCE
Status: COMPLETED | OUTPATIENT
Start: 2025-05-18 | End: 2025-05-18

## 2025-05-18 RX ADMIN — Medication 400 MG: at 01:46

## 2025-05-18 RX ADMIN — MAJOR MAGNESIUM CITRATE ORAL SOLUTION - LEMON 296 ML: 1.75 LIQUID ORAL at 01:46

## 2025-05-18 RX ADMIN — KETOROLAC TROMETHAMINE 30 MG: 30 INJECTION, SOLUTION INTRAMUSCULAR at 01:58

## 2025-05-18 NOTE — DISCHARGE INSTRUCTIONS
Have your magnesium rechecked as soon as possible return if increased abdominal pain fevers vomiting or bleeding have your blood pressure rechecked as soon as possible as well

## 2025-05-18 NOTE — ED PROVIDER NOTES
NEGATIVE    Leukocyte Esterase, Urine NEGATIVE NEGATIVE    WBC, UA 0 TO 5 0 TO 5 /HPF    RBC, UA 0 TO 2 0 TO 2 /HPF   Magnesium   Result Value Ref Range    Magnesium 1.4 (L) 1.6 - 2.6 mg/dL       RADIOLOGY:  Interpreted by Radiologist.  CT ABDOMEN PELVIS WO CONTRAST Additional Contrast? None   Final Result   1. No evidence for diverticulitis.   2. Colonic fecal retention.   3. Prostatomegaly with mild diffuse urinary bladder wall thickening.   4. Stable 1.0 cm low-attenuation lesion involving the left lobe of the liver.   This was present on 11/19/2024 and unchanged.             ------------------------- NURSING NOTES AND VITALS REVIEWED ---------------------------   The nursing notes within the ED encounter and vital signs as below have been reviewed.   BP (!) 202/94   Pulse 95   Temp 97.9 °F (36.6 °C) (Oral)   Resp 20   Ht 1.753 m (5' 9\")   Wt 123.4 kg (272 lb)   SpO2 95%   BMI 40.17 kg/m²   Oxygen Saturation Interpretation: Normal      ---------------------------------------------------PHYSICAL EXAM--------------------------------------      Constitutional/General: Alert and oriented x3, well appearing, non toxic in NAD  Head: NC/AT  Eyes: PERRL, EOMI  Mouth: Oropharynx clear, handling secretions, no trismus  Neck: Supple, full ROM, no meningeal signs  Pulmonary: Lungs clear to auscultation bilaterally, no wheezes, rales, or rhonchi. Not in respiratory distress  Cardiovascular:  Regular rate and rhythm, no murmurs, gallops, or rubs. 2+ distal pulses  Abdomen: Soft, non tender, non distended,   Extremities: Moves all extremities x 4. Warm and well perfused  Skin: warm and dry without rash  Neurologic: GCS 15,  Psych: Normal Affect    He did not want his lactic acid rechecked he was asymptomatic and able to drink fluids he told to return immediately if he has increased abdominal pain fevers vomiting or any new symptoms once again he refused repeat being read stuck for  ------------------------------ ED

## 2025-05-20 ENCOUNTER — TELEPHONE (OUTPATIENT)
Dept: ENDOCRINOLOGY | Age: 75
End: 2025-05-20

## 2025-05-20 DIAGNOSIS — Z79.4 TYPE 2 DIABETES MELLITUS WITH HYPERGLYCEMIA, WITH LONG-TERM CURRENT USE OF INSULIN (HCC): ICD-10-CM

## 2025-05-20 DIAGNOSIS — E11.65 TYPE 2 DIABETES MELLITUS WITH HYPERGLYCEMIA, WITH LONG-TERM CURRENT USE OF INSULIN (HCC): ICD-10-CM

## 2025-05-20 NOTE — TELEPHONE ENCOUNTER
Patient lm on rx line requesting refill of Mounjaro - sent to [facility] on [address].     Lm on patient machine requesting a call back regarding what dose of medication he has been taking.

## 2025-05-23 ENCOUNTER — HOSPITAL ENCOUNTER (OUTPATIENT)
Dept: CARDIAC REHAB | Age: 75
Setting detail: THERAPIES SERIES
Discharge: HOME | End: 2025-05-23
Payer: MEDICARE

## 2025-05-23 ASSESSMENT — PATIENT HEALTH QUESTIONNAIRE - PHQ9
7. TROUBLE CONCENTRATING ON THINGS, SUCH AS READING THE NEWSPAPER OR WATCHING TELEVISION: SEVERAL DAYS
9. THOUGHTS THAT YOU WOULD BE BETTER OFF DEAD, OR OF HURTING YOURSELF: NOT AT ALL
SUM OF ALL RESPONSES TO PHQ QUESTIONS 1-9: 5
SUM OF ALL RESPONSES TO PHQ QUESTIONS 1-9: 5
6. FEELING BAD ABOUT YOURSELF - OR THAT YOU ARE A FAILURE OR HAVE LET YOURSELF OR YOUR FAMILY DOWN: NOT AT ALL
8. MOVING OR SPEAKING SO SLOWLY THAT OTHER PEOPLE COULD HAVE NOTICED. OR THE OPPOSITE, BEING SO FIGETY OR RESTLESS THAT YOU HAVE BEEN MOVING AROUND A LOT MORE THAN USUAL: NOT AT ALL
SUM OF ALL RESPONSES TO PHQ QUESTIONS 1-9: 5
10. IF YOU CHECKED OFF ANY PROBLEMS, HOW DIFFICULT HAVE THESE PROBLEMS MADE IT FOR YOU TO DO YOUR WORK, TAKE CARE OF THINGS AT HOME, OR GET ALONG WITH OTHER PEOPLE: NOT DIFFICULT AT ALL
3. TROUBLE FALLING OR STAYING ASLEEP: SEVERAL DAYS
4. FEELING TIRED OR HAVING LITTLE ENERGY: NOT AT ALL
5. POOR APPETITE OR OVEREATING: NEARLY EVERY DAY
SUM OF ALL RESPONSES TO PHQ QUESTIONS 1-9: 5
2. FEELING DOWN, DEPRESSED OR HOPELESS: NOT AT ALL
1. LITTLE INTEREST OR PLEASURE IN DOING THINGS: NOT AT ALL

## 2025-05-27 RX ORDER — TIRZEPATIDE 2.5 MG/.5ML
2.5 INJECTION, SOLUTION SUBCUTANEOUS WEEKLY
Qty: 2 ML | Refills: 0 | Status: SHIPPED | OUTPATIENT
Start: 2025-05-27

## 2025-05-30 ENCOUNTER — APPOINTMENT (OUTPATIENT)
Dept: CARDIAC REHAB | Age: 75
End: 2025-05-30
Payer: MEDICARE

## 2025-06-02 ENCOUNTER — TELEPHONE (OUTPATIENT)
Dept: ENDOCRINOLOGY | Age: 75
End: 2025-06-02

## 2025-06-02 NOTE — TELEPHONE ENCOUNTER
Pt stopped in office for Dexcom download, stated his sugars have been up and down - high with meals but low in early morning hours.     Pt reported current regimen:   Mounjaro 2.5mg weekly (Saturday)  Humalog 12 untis w/ meals plus high scale  Lantus 26 units at night    Pt stated he will check BS prior to meal time, if sugar is elevated, he does injection prior to eating.     Download attached.

## 2025-06-02 NOTE — TELEPHONE ENCOUNTER
Is he sure that the low blood sugars in the early morning hours are accurate?  Dexcom can give false lows due to compression while sleeping.  I recommend verifying low blood sugars with the fingerstick.  If they are truly low, decrease Lantus to 20 units nightly    Overall, blood sugars are well above goal.  I recommend increasing Mounjaro to 5 mg weekly

## 2025-06-17 PROBLEM — E86.0 DEHYDRATION: Status: RESOLVED | Noted: 2025-05-18 | Resolved: 2025-06-17

## 2025-07-09 ENCOUNTER — HOSPITAL ENCOUNTER (EMERGENCY)
Age: 75
Discharge: HOME | End: 2025-07-09
Attending: EMERGENCY MEDICINE
Payer: MEDICARE

## 2025-07-09 ENCOUNTER — APPOINTMENT (OUTPATIENT)
Dept: CT IMAGING | Age: 75
End: 2025-07-09
Payer: MEDICARE

## 2025-07-09 ENCOUNTER — APPOINTMENT (OUTPATIENT)
Dept: GENERAL RADIOLOGY | Age: 75
End: 2025-07-09
Payer: MEDICARE

## 2025-07-09 VITALS
TEMPERATURE: 97.6 F | OXYGEN SATURATION: 97 % | DIASTOLIC BLOOD PRESSURE: 88 MMHG | RESPIRATION RATE: 16 BRPM | WEIGHT: 260 LBS | SYSTOLIC BLOOD PRESSURE: 157 MMHG | BODY MASS INDEX: 38.4 KG/M2 | HEART RATE: 98 BPM

## 2025-07-09 DIAGNOSIS — G89.29 CHRONIC LEFT-SIDED LOW BACK PAIN WITH LEFT-SIDED SCIATICA: Primary | ICD-10-CM

## 2025-07-09 DIAGNOSIS — M54.42 CHRONIC LEFT-SIDED LOW BACK PAIN WITH LEFT-SIDED SCIATICA: Primary | ICD-10-CM

## 2025-07-09 PROCEDURE — 74176 CT ABD & PELVIS W/O CONTRAST: CPT

## 2025-07-09 PROCEDURE — 99284 EMERGENCY DEPT VISIT MOD MDM: CPT

## 2025-07-09 PROCEDURE — 6370000000 HC RX 637 (ALT 250 FOR IP): Performed by: EMERGENCY MEDICINE

## 2025-07-09 PROCEDURE — 6360000002 HC RX W HCPCS: Performed by: EMERGENCY MEDICINE

## 2025-07-09 PROCEDURE — 96372 THER/PROPH/DIAG INJ SC/IM: CPT

## 2025-07-09 PROCEDURE — 72131 CT LUMBAR SPINE W/O DYE: CPT

## 2025-07-09 PROCEDURE — 73551 X-RAY EXAM OF FEMUR 1: CPT

## 2025-07-09 RX ORDER — OXYCODONE AND ACETAMINOPHEN 5; 325 MG/1; MG/1
1 TABLET ORAL EVERY 6 HOURS PRN
Qty: 12 TABLET | Refills: 0 | Status: SHIPPED | OUTPATIENT
Start: 2025-07-09 | End: 2025-07-16

## 2025-07-09 RX ORDER — KETOROLAC TROMETHAMINE 30 MG/ML
60 INJECTION, SOLUTION INTRAMUSCULAR; INTRAVENOUS ONCE
Status: COMPLETED | OUTPATIENT
Start: 2025-07-09 | End: 2025-07-09

## 2025-07-09 RX ORDER — NAPROXEN 500 MG/1
500 TABLET ORAL 2 TIMES DAILY
Qty: 14 TABLET | Refills: 0 | Status: SHIPPED | OUTPATIENT
Start: 2025-07-09 | End: 2025-07-16

## 2025-07-09 RX ORDER — OXYCODONE AND ACETAMINOPHEN 5; 325 MG/1; MG/1
2 TABLET ORAL ONCE
Refills: 0 | Status: COMPLETED | OUTPATIENT
Start: 2025-07-09 | End: 2025-07-09

## 2025-07-09 RX ADMIN — OXYCODONE HYDROCHLORIDE AND ACETAMINOPHEN 2 TABLET: 5; 325 TABLET ORAL at 23:18

## 2025-07-09 RX ADMIN — KETOROLAC TROMETHAMINE 60 MG: 60 INJECTION, SOLUTION INTRAMUSCULAR at 23:29

## 2025-07-09 ASSESSMENT — PAIN DESCRIPTION - ORIENTATION: ORIENTATION: LEFT

## 2025-07-09 ASSESSMENT — PAIN DESCRIPTION - LOCATION
LOCATION: BACK
LOCATION: HIP;LEG;BACK

## 2025-07-09 ASSESSMENT — PAIN SCALES - GENERAL
PAINLEVEL_OUTOF10: 10
PAINLEVEL_OUTOF10: 10

## 2025-07-09 ASSESSMENT — PAIN DESCRIPTION - DESCRIPTORS
DESCRIPTORS: DISCOMFORT;SHARP
DESCRIPTORS: SHARP;DISCOMFORT

## 2025-07-09 ASSESSMENT — PAIN - FUNCTIONAL ASSESSMENT: PAIN_FUNCTIONAL_ASSESSMENT: 0-10

## 2025-07-10 NOTE — DISCHARGE INSTRUCTIONS
You need to call Dr. Gonzalez first thing in the morning return if any bowel or bladder incontinence or problems with urination or bowel movements any fevers abdominal pain or any new symptoms in your leg

## 2025-07-10 NOTE — ED PROVIDER NOTES
HPI:  7/9/25,   Time: 11:23 PM EDT         Javier Justin Sr. is a 74 y.o. male presenting to the ED for lower back pain going down his left leg this has been chronic he knows he has a loose screw from surgery done by Dr. Lugo the orthopedist he he did not actually fall he did not hit his head he he is on Eliquis he refused a CAT scan despite I wanting to get that because he is on Eliquis but he said there was no trauma to his head and he said he never hit his head he just got stuck when he when he was walking once again he refused the CAT scan acceptable risk since he is on Eliquis of refusing the CAT scan he is competent sober and stable and do not see any trauma and told him he needs to watch for any signs of head injury which she has none at this time no vomiting no neck pain and once again refused a CAT scan of his head AGAINST MEDICAL ADVICE, beginning days ago.  The complaint has been persistent, moderate in severity, and worsened by nothing.      ROS:   Pertinent positives and negatives are stated within HPI, all other systems reviewed and are negative.  --------------------------------------------- PAST HISTORY ---------------------------------------------  Past Medical History:  has a past medical history of Anxiety, Arthritis, Back pain, CAD (coronary artery disease), Diabetes mellitus (HCC), History of blood transfusion, Hx of blood clots, Hyperlipidemia, Hypertension, and Sciatica.    Past Surgical History:  has a past surgical history that includes back surgery; knee surgery; other surgical history (5/8/2014); Carpal tunnel release; hip surgery (Right); Vena Cava Filter Placement (N/A, 11/7/2024); invasive vascular (N/A, 1/16/2025); Cardiac procedure (N/A, 3/7/2025); Cardiac procedure (N/A, 3/7/2025); Cardiac procedure (N/A, 3/7/2025); and Cardiac procedure (N/A, 3/7/2025).    Social History:  reports that he quit smoking about 11 years ago. His smoking use included cigarettes. He started smoking

## 2025-07-22 ENCOUNTER — TELEPHONE (OUTPATIENT)
Dept: CARDIOLOGY CLINIC | Age: 75
End: 2025-07-22

## 2025-07-22 NOTE — TELEPHONE ENCOUNTER
Patient called stating he needs cardiac clearance for lumbar fusion extraction by Dr. Lugo on 8/8/25.     Patient denies any chest pain, shortness of breath or palpitations since last visit in April 2025.  Patient is able to complete all activities of daily living. Patient does climb one flight of stairs and walks one block without cardiac symptoms.  Patient on Aspirin and Brilinta.     Fax: [phone]

## 2025-07-24 NOTE — TELEPHONE ENCOUNTER
He performs more than 4 METS of physical activities and denies any cardiac symptoms.  Therefore, he would be considered low risk for perioperative ischemic cardiac events and no further preop cardiac testing is needed.    However, he just underwent stenting of the LAD 3-25 with a long, relatively narrow stent.  This would be a higher risk stent for stent thrombosis if the aspirin or Brilinta was held.  I would recommend that he not interrupt his dual antiplatelet therapy (Brilinta and aspirin) for at least 1 year after stent placement for an elective procedure if at all possible.  The risk is higher in the first 3 months but still present up to 1 year.  He is more than 3 months but still less than 1 year and therefore would still have some risk of stent thrombosis if this is interrupted.  If this is and urgent/nonelective procedure, and cannot wait for 1 year then he will have no choice but to hold the Brilinta for 5 days prior to procedure knowing that there is still some risk of stent thrombosis.  I would recommend not missing any doses of aspirin perioperatively if at all possible.

## 2025-07-24 NOTE — TELEPHONE ENCOUNTER
Patient notified of Dr. Noel's risk assessment/recommendation.  Note faxed to Dr. Lugo [phone].

## 2025-07-25 DIAGNOSIS — Z79.4 TYPE 2 DIABETES MELLITUS WITH HYPERGLYCEMIA, WITH LONG-TERM CURRENT USE OF INSULIN (HCC): ICD-10-CM

## 2025-07-25 DIAGNOSIS — E11.65 TYPE 2 DIABETES MELLITUS WITH HYPERGLYCEMIA, WITH LONG-TERM CURRENT USE OF INSULIN (HCC): ICD-10-CM

## 2025-07-25 RX ORDER — ACYCLOVIR 400 MG/1
TABLET ORAL
Qty: 9 EACH | Refills: 3 | Status: SHIPPED | OUTPATIENT
Start: 2025-07-25

## 2025-08-01 ENCOUNTER — HOSPITAL ENCOUNTER (EMERGENCY)
Age: 75
Discharge: ANOTHER HOSPITAL | End: 2025-08-02
Attending: STUDENT IN AN ORGANIZED HEALTH CARE EDUCATION/TRAINING PROGRAM
Payer: MEDICARE

## 2025-08-01 ENCOUNTER — APPOINTMENT (OUTPATIENT)
Dept: GENERAL RADIOLOGY | Age: 75
End: 2025-08-01
Payer: MEDICARE

## 2025-08-01 ENCOUNTER — APPOINTMENT (OUTPATIENT)
Dept: CT IMAGING | Age: 75
End: 2025-08-01
Payer: MEDICARE

## 2025-08-01 VITALS
TEMPERATURE: 98.4 F | RESPIRATION RATE: 14 BRPM | DIASTOLIC BLOOD PRESSURE: 81 MMHG | WEIGHT: 260 LBS | HEART RATE: 95 BPM | OXYGEN SATURATION: 97 % | SYSTOLIC BLOOD PRESSURE: 135 MMHG | BODY MASS INDEX: 38.4 KG/M2

## 2025-08-01 DIAGNOSIS — N17.9 AKI (ACUTE KIDNEY INJURY): ICD-10-CM

## 2025-08-01 DIAGNOSIS — R07.9 CHEST PAIN, UNSPECIFIED TYPE: Primary | ICD-10-CM

## 2025-08-01 LAB
ALBUMIN SERPL-MCNC: 4.1 G/DL (ref 3.5–5.2)
ALP SERPL-CCNC: 119 U/L (ref 40–129)
ALT SERPL-CCNC: 31 U/L (ref 0–50)
ANION GAP SERPL CALCULATED.3IONS-SCNC: 16 MMOL/L (ref 7–16)
AST SERPL-CCNC: 52 U/L (ref 0–50)
BASOPHILS # BLD: 0.07 K/UL (ref 0–0.2)
BASOPHILS NFR BLD: 1 % (ref 0–2)
BILIRUB SERPL-MCNC: 0.9 MG/DL (ref 0–1.2)
BNP SERPL-MCNC: <36 PG/ML (ref 0–125)
BUN SERPL-MCNC: 21 MG/DL (ref 8–23)
CALCIUM SERPL-MCNC: 9.4 MG/DL (ref 8.8–10.2)
CHLORIDE SERPL-SCNC: 96 MMOL/L (ref 98–107)
CO2 SERPL-SCNC: 26 MMOL/L (ref 22–29)
CREAT SERPL-MCNC: 1.4 MG/DL (ref 0.7–1.2)
D-DIMER QUANTITATIVE: 496 NG/ML DDU (ref 0–230)
EOSINOPHIL # BLD: 0.22 K/UL (ref 0.05–0.5)
EOSINOPHILS RELATIVE PERCENT: 3 % (ref 0–6)
ERYTHROCYTE [DISTWIDTH] IN BLOOD BY AUTOMATED COUNT: 15.1 % (ref 11.5–15)
GFR, ESTIMATED: 53 ML/MIN/1.73M2
GLUCOSE SERPL-MCNC: 200 MG/DL (ref 74–99)
HCT VFR BLD AUTO: 34.1 % (ref 37–54)
HGB BLD-MCNC: 11.3 G/DL (ref 12.5–16.5)
IMM GRANULOCYTES # BLD AUTO: 0.03 K/UL (ref 0–0.58)
IMM GRANULOCYTES NFR BLD: 0 % (ref 0–5)
LYMPHOCYTES NFR BLD: 1.98 K/UL (ref 1.5–4)
LYMPHOCYTES RELATIVE PERCENT: 22 % (ref 20–42)
MCH RBC QN AUTO: 26.5 PG (ref 26–35)
MCHC RBC AUTO-ENTMCNC: 33.1 G/DL (ref 32–34.5)
MCV RBC AUTO: 80 FL (ref 80–99.9)
MONOCYTES NFR BLD: 1.03 K/UL (ref 0.1–0.95)
MONOCYTES NFR BLD: 12 % (ref 2–12)
NEUTROPHILS NFR BLD: 63 % (ref 43–80)
NEUTS SEG NFR BLD: 5.59 K/UL (ref 1.8–7.3)
PLATELET # BLD AUTO: 246 K/UL (ref 130–450)
PMV BLD AUTO: 10.4 FL (ref 7–12)
POTASSIUM SERPL-SCNC: 4 MMOL/L (ref 3.5–5.1)
PROT SERPL-MCNC: 7.3 G/DL (ref 6.4–8.3)
RBC # BLD AUTO: 4.26 M/UL (ref 3.8–5.8)
SODIUM SERPL-SCNC: 137 MMOL/L (ref 136–145)
TROPONIN I SERPL HS-MCNC: 36 NG/L (ref 0–22)
TROPONIN I SERPL HS-MCNC: 46 NG/L (ref 0–22)
WBC OTHER # BLD: 8.9 K/UL (ref 4.5–11.5)

## 2025-08-01 PROCEDURE — 6360000004 HC RX CONTRAST MEDICATION: Performed by: RADIOLOGY

## 2025-08-01 PROCEDURE — 83880 ASSAY OF NATRIURETIC PEPTIDE: CPT

## 2025-08-01 PROCEDURE — 99285 EMERGENCY DEPT VISIT HI MDM: CPT

## 2025-08-01 PROCEDURE — 71275 CT ANGIOGRAPHY CHEST: CPT

## 2025-08-01 PROCEDURE — 85025 COMPLETE CBC W/AUTO DIFF WBC: CPT

## 2025-08-01 PROCEDURE — 84484 ASSAY OF TROPONIN QUANT: CPT

## 2025-08-01 PROCEDURE — 6370000000 HC RX 637 (ALT 250 FOR IP): Performed by: STUDENT IN AN ORGANIZED HEALTH CARE EDUCATION/TRAINING PROGRAM

## 2025-08-01 PROCEDURE — 2580000003 HC RX 258: Performed by: STUDENT IN AN ORGANIZED HEALTH CARE EDUCATION/TRAINING PROGRAM

## 2025-08-01 PROCEDURE — 93005 ELECTROCARDIOGRAM TRACING: CPT | Performed by: STUDENT IN AN ORGANIZED HEALTH CARE EDUCATION/TRAINING PROGRAM

## 2025-08-01 PROCEDURE — 80053 COMPREHEN METABOLIC PANEL: CPT

## 2025-08-01 PROCEDURE — 6370000000 HC RX 637 (ALT 250 FOR IP): Performed by: EMERGENCY MEDICINE

## 2025-08-01 PROCEDURE — 6360000002 HC RX W HCPCS: Performed by: STUDENT IN AN ORGANIZED HEALTH CARE EDUCATION/TRAINING PROGRAM

## 2025-08-01 PROCEDURE — 96374 THER/PROPH/DIAG INJ IV PUSH: CPT

## 2025-08-01 PROCEDURE — 85379 FIBRIN DEGRADATION QUANT: CPT

## 2025-08-01 PROCEDURE — 71045 X-RAY EXAM CHEST 1 VIEW: CPT

## 2025-08-01 RX ORDER — CLONIDINE HYDROCHLORIDE 0.1 MG/1
0.1 TABLET ORAL 2 TIMES DAILY
Status: CANCELLED | OUTPATIENT
Start: 2025-08-01

## 2025-08-01 RX ORDER — ASPIRIN 81 MG/1
324 TABLET, CHEWABLE ORAL ONCE
Status: COMPLETED | OUTPATIENT
Start: 2025-08-01 | End: 2025-08-01

## 2025-08-01 RX ORDER — ROPINIROLE 0.25 MG/1
0.25 TABLET, FILM COATED ORAL NIGHTLY
Status: CANCELLED | OUTPATIENT
Start: 2025-08-01

## 2025-08-01 RX ORDER — SODIUM CHLORIDE 9 MG/ML
INJECTION, SOLUTION INTRAVENOUS PRN
Status: CANCELLED | OUTPATIENT
Start: 2025-08-01

## 2025-08-01 RX ORDER — ONDANSETRON 2 MG/ML
4 INJECTION INTRAMUSCULAR; INTRAVENOUS EVERY 6 HOURS PRN
Status: CANCELLED | OUTPATIENT
Start: 2025-08-01

## 2025-08-01 RX ORDER — PANTOPRAZOLE SODIUM 40 MG/1
40 TABLET, DELAYED RELEASE ORAL
Status: CANCELLED | OUTPATIENT
Start: 2025-08-02

## 2025-08-01 RX ORDER — HYDROCODONE BITARTRATE AND ACETAMINOPHEN 5; 325 MG/1; MG/1
1 TABLET ORAL ONCE
Status: COMPLETED | OUTPATIENT
Start: 2025-08-01 | End: 2025-08-01

## 2025-08-01 RX ORDER — POTASSIUM CHLORIDE 7.45 MG/ML
10 INJECTION INTRAVENOUS PRN
Status: CANCELLED | OUTPATIENT
Start: 2025-08-01

## 2025-08-01 RX ORDER — ATORVASTATIN CALCIUM 40 MG/1
40 TABLET, FILM COATED ORAL NIGHTLY
Status: CANCELLED | OUTPATIENT
Start: 2025-08-02

## 2025-08-01 RX ORDER — LOSARTAN POTASSIUM 25 MG/1
25 TABLET ORAL DAILY
Status: CANCELLED | OUTPATIENT
Start: 2025-08-02

## 2025-08-01 RX ORDER — TICAGRELOR 90 MG/1
90 TABLET, FILM COATED ORAL 2 TIMES DAILY
Status: CANCELLED | OUTPATIENT
Start: 2025-08-01

## 2025-08-01 RX ORDER — MAGNESIUM SULFATE IN WATER 40 MG/ML
2000 INJECTION, SOLUTION INTRAVENOUS PRN
Status: CANCELLED | OUTPATIENT
Start: 2025-08-01

## 2025-08-01 RX ORDER — METOPROLOL SUCCINATE 25 MG/1
25 TABLET, EXTENDED RELEASE ORAL DAILY
Status: CANCELLED | OUTPATIENT
Start: 2025-08-02

## 2025-08-01 RX ORDER — DEXTROSE MONOHYDRATE 100 MG/ML
INJECTION, SOLUTION INTRAVENOUS CONTINUOUS PRN
Status: CANCELLED | OUTPATIENT
Start: 2025-08-01

## 2025-08-01 RX ORDER — ALPRAZOLAM 0.25 MG
0.5 TABLET ORAL NIGHTLY PRN
Status: CANCELLED | OUTPATIENT
Start: 2025-08-01

## 2025-08-01 RX ORDER — SENNOSIDES 8.6 MG/1
1 TABLET ORAL DAILY PRN
Status: CANCELLED | OUTPATIENT
Start: 2025-08-01

## 2025-08-01 RX ORDER — SODIUM CHLORIDE 0.9 % (FLUSH) 0.9 %
10 SYRINGE (ML) INJECTION PRN
Status: CANCELLED | OUTPATIENT
Start: 2025-08-01

## 2025-08-01 RX ORDER — FENTANYL CITRATE 50 UG/ML
50 INJECTION, SOLUTION INTRAMUSCULAR; INTRAVENOUS ONCE
Status: COMPLETED | OUTPATIENT
Start: 2025-08-01 | End: 2025-08-01

## 2025-08-01 RX ORDER — LORAZEPAM 1 MG/1
1 TABLET ORAL ONCE
Status: COMPLETED | OUTPATIENT
Start: 2025-08-01 | End: 2025-08-01

## 2025-08-01 RX ORDER — LIDOCAINE 4 G/G
1 PATCH TOPICAL DAILY
Status: CANCELLED | OUTPATIENT
Start: 2025-08-02

## 2025-08-01 RX ORDER — ENOXAPARIN SODIUM 100 MG/ML
30 INJECTION SUBCUTANEOUS 2 TIMES DAILY
Status: CANCELLED | OUTPATIENT
Start: 2025-08-01

## 2025-08-01 RX ORDER — SODIUM CHLORIDE 0.9 % (FLUSH) 0.9 %
10 SYRINGE (ML) INJECTION EVERY 12 HOURS SCHEDULED
Status: CANCELLED | OUTPATIENT
Start: 2025-08-01

## 2025-08-01 RX ORDER — 0.9 % SODIUM CHLORIDE 0.9 %
1000 INTRAVENOUS SOLUTION INTRAVENOUS ONCE
Status: COMPLETED | OUTPATIENT
Start: 2025-08-01 | End: 2025-08-02

## 2025-08-01 RX ORDER — INSULIN LISPRO 100 [IU]/ML
9 INJECTION, SOLUTION INTRAVENOUS; SUBCUTANEOUS
Status: CANCELLED | OUTPATIENT
Start: 2025-08-02

## 2025-08-01 RX ORDER — ASPIRIN 81 MG/1
81 TABLET ORAL DAILY
Status: CANCELLED | OUTPATIENT
Start: 2025-08-02

## 2025-08-01 RX ORDER — IOPAMIDOL 755 MG/ML
75 INJECTION, SOLUTION INTRAVASCULAR
Status: COMPLETED | OUTPATIENT
Start: 2025-08-01 | End: 2025-08-01

## 2025-08-01 RX ORDER — MAGNESIUM OXIDE 420 MG/1
1 TABLET ORAL DAILY
Status: CANCELLED | OUTPATIENT
Start: 2025-08-02

## 2025-08-01 RX ORDER — ACETAMINOPHEN 325 MG/1
650 TABLET ORAL EVERY 6 HOURS PRN
Status: CANCELLED | OUTPATIENT
Start: 2025-08-01

## 2025-08-01 RX ORDER — ONDANSETRON 4 MG/1
4 TABLET, ORALLY DISINTEGRATING ORAL EVERY 8 HOURS PRN
Status: CANCELLED | OUTPATIENT
Start: 2025-08-01

## 2025-08-01 RX ORDER — HYDROCODONE BITARTRATE AND ACETAMINOPHEN 10; 325 MG/1; MG/1
1 TABLET ORAL EVERY 6 HOURS PRN
Refills: 0 | Status: CANCELLED | OUTPATIENT
Start: 2025-08-01

## 2025-08-01 RX ORDER — PREGABALIN 75 MG/1
150 CAPSULE ORAL 2 TIMES DAILY
Status: CANCELLED | OUTPATIENT
Start: 2025-08-01

## 2025-08-01 RX ORDER — ACETAMINOPHEN 650 MG/1
650 SUPPOSITORY RECTAL EVERY 6 HOURS PRN
Status: CANCELLED | OUTPATIENT
Start: 2025-08-01

## 2025-08-01 RX ORDER — POTASSIUM CHLORIDE 1500 MG/1
40 TABLET, EXTENDED RELEASE ORAL PRN
Status: CANCELLED | OUTPATIENT
Start: 2025-08-01

## 2025-08-01 RX ORDER — GLUCAGON 1 MG/ML
1 KIT INJECTION PRN
Status: CANCELLED | OUTPATIENT
Start: 2025-08-01

## 2025-08-01 RX ADMIN — SODIUM CHLORIDE 1000 ML: 0.9 INJECTION, SOLUTION INTRAVENOUS at 21:21

## 2025-08-01 RX ADMIN — FENTANYL CITRATE 50 MCG: 50 INJECTION INTRAMUSCULAR; INTRAVENOUS at 22:11

## 2025-08-01 RX ADMIN — HYDROCODONE BITARTRATE AND ACETAMINOPHEN 1 TABLET: 5; 325 TABLET ORAL at 23:04

## 2025-08-01 RX ADMIN — IOPAMIDOL 75 ML: 755 INJECTION, SOLUTION INTRAVENOUS at 23:21

## 2025-08-01 RX ADMIN — ASPIRIN 324 MG: 81 TABLET, CHEWABLE ORAL at 21:21

## 2025-08-01 RX ADMIN — LORAZEPAM 1 MG: 1 TABLET ORAL at 23:26

## 2025-08-01 ASSESSMENT — ENCOUNTER SYMPTOMS
CHEST TIGHTNESS: 1
WHEEZING: 0
ABDOMINAL PAIN: 0
VOMITING: 0
SHORTNESS OF BREATH: 1
EYE DISCHARGE: 0
SINUS PRESSURE: 0
BACK PAIN: 0
EYE REDNESS: 0
COUGH: 0
SORE THROAT: 0
EYE PAIN: 0
DIARRHEA: 0
NAUSEA: 0

## 2025-08-01 ASSESSMENT — PAIN SCALES - GENERAL
PAINLEVEL_OUTOF10: 10
PAINLEVEL_OUTOF10: 10

## 2025-08-01 ASSESSMENT — PAIN DESCRIPTION - LOCATION
LOCATION: CHEST
LOCATION: CHEST

## 2025-08-01 ASSESSMENT — PAIN DESCRIPTION - ORIENTATION: ORIENTATION: MID

## 2025-08-01 ASSESSMENT — PAIN DESCRIPTION - DESCRIPTORS
DESCRIPTORS: PRESSURE
DESCRIPTORS: SHARP;SHOOTING

## 2025-08-02 ENCOUNTER — HOSPITAL ENCOUNTER (OUTPATIENT)
Age: 75
Setting detail: OBSERVATION
Discharge: HOME | End: 2025-08-02
Attending: INTERNAL MEDICINE | Admitting: INTERNAL MEDICINE
Payer: MEDICARE

## 2025-08-02 VITALS
TEMPERATURE: 97.7 F | HEIGHT: 70 IN | BODY MASS INDEX: 37.22 KG/M2 | HEART RATE: 87 BPM | WEIGHT: 260 LBS | RESPIRATION RATE: 18 BRPM | SYSTOLIC BLOOD PRESSURE: 99 MMHG | OXYGEN SATURATION: 98 % | DIASTOLIC BLOOD PRESSURE: 58 MMHG

## 2025-08-02 LAB
ALBUMIN SERPL-MCNC: 3.6 G/DL (ref 3.5–5.2)
ALP SERPL-CCNC: 109 U/L (ref 40–129)
ALT SERPL-CCNC: 28 U/L (ref 0–50)
ANION GAP SERPL CALCULATED.3IONS-SCNC: 16 MMOL/L (ref 7–16)
AST SERPL-CCNC: 41 U/L (ref 0–50)
BASOPHILS # BLD: 0.05 K/UL (ref 0–0.2)
BASOPHILS NFR BLD: 1 % (ref 0–2)
BILIRUB SERPL-MCNC: 0.7 MG/DL (ref 0–1.2)
BUN SERPL-MCNC: 24 MG/DL (ref 8–23)
CALCIUM SERPL-MCNC: 9.4 MG/DL (ref 8.8–10.2)
CHLORIDE SERPL-SCNC: 99 MMOL/L (ref 98–107)
CHOLEST SERPL-MCNC: 120 MG/DL
CO2 SERPL-SCNC: 23 MMOL/L (ref 22–29)
CREAT SERPL-MCNC: 1.2 MG/DL (ref 0.7–1.2)
EOSINOPHIL # BLD: 0.2 K/UL (ref 0.05–0.5)
EOSINOPHILS RELATIVE PERCENT: 3 % (ref 0–6)
ERYTHROCYTE [DISTWIDTH] IN BLOOD BY AUTOMATED COUNT: 15.4 % (ref 11.5–15)
GFR, ESTIMATED: 63 ML/MIN/1.73M2
GLUCOSE BLD-MCNC: 143 MG/DL (ref 74–99)
GLUCOSE BLD-MCNC: 147 MG/DL (ref 74–99)
GLUCOSE BLD-MCNC: 199 MG/DL (ref 74–99)
GLUCOSE SERPL-MCNC: 135 MG/DL (ref 74–99)
HCT VFR BLD AUTO: 34.7 % (ref 37–54)
HDLC SERPL-MCNC: 29 MG/DL
HGB BLD-MCNC: 11.2 G/DL (ref 12.5–16.5)
IMM GRANULOCYTES # BLD AUTO: 0.03 K/UL (ref 0–0.58)
IMM GRANULOCYTES NFR BLD: 0 % (ref 0–5)
LDLC SERPL CALC-MCNC: 58 MG/DL
LYMPHOCYTES NFR BLD: 2.14 K/UL (ref 1.5–4)
LYMPHOCYTES RELATIVE PERCENT: 28 % (ref 20–42)
MAGNESIUM SERPL-MCNC: 1.4 MG/DL (ref 1.6–2.4)
MCH RBC QN AUTO: 25.9 PG (ref 26–35)
MCHC RBC AUTO-ENTMCNC: 32.3 G/DL (ref 32–34.5)
MCV RBC AUTO: 80.1 FL (ref 80–99.9)
MONOCYTES NFR BLD: 0.94 K/UL (ref 0.1–0.95)
MONOCYTES NFR BLD: 12 % (ref 2–12)
NEUTROPHILS NFR BLD: 56 % (ref 43–80)
NEUTS SEG NFR BLD: 4.35 K/UL (ref 1.8–7.3)
PLATELET # BLD AUTO: 225 K/UL (ref 130–450)
PMV BLD AUTO: 11.2 FL (ref 7–12)
POTASSIUM SERPL-SCNC: 3.8 MMOL/L (ref 3.5–5.1)
PROT SERPL-MCNC: 6.8 G/DL (ref 6.4–8.3)
RBC # BLD AUTO: 4.33 M/UL (ref 3.8–5.8)
SODIUM SERPL-SCNC: 138 MMOL/L (ref 136–145)
TRIGL SERPL-MCNC: 164 MG/DL
VLDLC SERPL CALC-MCNC: 33 MG/DL
WBC OTHER # BLD: 7.7 K/UL (ref 4.5–11.5)

## 2025-08-02 PROCEDURE — 99222 1ST HOSP IP/OBS MODERATE 55: CPT | Performed by: INTERNAL MEDICINE

## 2025-08-02 PROCEDURE — 36415 COLL VENOUS BLD VENIPUNCTURE: CPT

## 2025-08-02 PROCEDURE — 96365 THER/PROPH/DIAG IV INF INIT: CPT

## 2025-08-02 PROCEDURE — 6360000002 HC RX W HCPCS: Performed by: NURSE PRACTITIONER

## 2025-08-02 PROCEDURE — APPSS60 APP SPLIT SHARED TIME 46-60 MINUTES

## 2025-08-02 PROCEDURE — 80061 LIPID PANEL: CPT

## 2025-08-02 PROCEDURE — 96372 THER/PROPH/DIAG INJ SC/IM: CPT

## 2025-08-02 PROCEDURE — G0378 HOSPITAL OBSERVATION PER HR: HCPCS

## 2025-08-02 PROCEDURE — 85025 COMPLETE CBC W/AUTO DIFF WBC: CPT

## 2025-08-02 PROCEDURE — G0379 DIRECT REFER HOSPITAL OBSERV: HCPCS

## 2025-08-02 PROCEDURE — 80053 COMPREHEN METABOLIC PANEL: CPT

## 2025-08-02 PROCEDURE — 96366 THER/PROPH/DIAG IV INF ADDON: CPT

## 2025-08-02 PROCEDURE — 6370000000 HC RX 637 (ALT 250 FOR IP): Performed by: NURSE PRACTITIONER

## 2025-08-02 PROCEDURE — 83735 ASSAY OF MAGNESIUM: CPT

## 2025-08-02 PROCEDURE — 82962 GLUCOSE BLOOD TEST: CPT

## 2025-08-02 PROCEDURE — 2500000003 HC RX 250 WO HCPCS: Performed by: NURSE PRACTITIONER

## 2025-08-02 PROCEDURE — 6360000002 HC RX W HCPCS

## 2025-08-02 RX ORDER — ONDANSETRON 2 MG/ML
4 INJECTION INTRAMUSCULAR; INTRAVENOUS EVERY 6 HOURS PRN
Status: DISCONTINUED | OUTPATIENT
Start: 2025-08-02 | End: 2025-08-02 | Stop reason: HOSPADM

## 2025-08-02 RX ORDER — ISOSORBIDE MONONITRATE 30 MG/1
30 TABLET, EXTENDED RELEASE ORAL DAILY
Status: DISCONTINUED | OUTPATIENT
Start: 2025-08-02 | End: 2025-08-02 | Stop reason: HOSPADM

## 2025-08-02 RX ORDER — SENNOSIDES 8.6 MG/1
1 TABLET ORAL DAILY PRN
Status: DISCONTINUED | OUTPATIENT
Start: 2025-08-02 | End: 2025-08-02 | Stop reason: HOSPADM

## 2025-08-02 RX ORDER — HYDROCODONE BITARTRATE AND ACETAMINOPHEN 10; 325 MG/1; MG/1
1 TABLET ORAL EVERY 6 HOURS PRN
Refills: 0 | Status: DISCONTINUED | OUTPATIENT
Start: 2025-08-02 | End: 2025-08-02 | Stop reason: HOSPADM

## 2025-08-02 RX ORDER — ONDANSETRON 4 MG/1
4 TABLET, ORALLY DISINTEGRATING ORAL EVERY 8 HOURS PRN
Status: DISCONTINUED | OUTPATIENT
Start: 2025-08-02 | End: 2025-08-02 | Stop reason: HOSPADM

## 2025-08-02 RX ORDER — LANOLIN ALCOHOL/MO/W.PET/CERES
400 CREAM (GRAM) TOPICAL DAILY
Status: DISCONTINUED | OUTPATIENT
Start: 2025-08-02 | End: 2025-08-02 | Stop reason: HOSPADM

## 2025-08-02 RX ORDER — DEXTROSE MONOHYDRATE 100 MG/ML
INJECTION, SOLUTION INTRAVENOUS CONTINUOUS PRN
Status: DISCONTINUED | OUTPATIENT
Start: 2025-08-02 | End: 2025-08-02 | Stop reason: HOSPADM

## 2025-08-02 RX ORDER — ATORVASTATIN CALCIUM 40 MG/1
40 TABLET, FILM COATED ORAL NIGHTLY
Status: DISCONTINUED | OUTPATIENT
Start: 2025-08-02 | End: 2025-08-02 | Stop reason: HOSPADM

## 2025-08-02 RX ORDER — ISOSORBIDE MONONITRATE 30 MG/1
30 TABLET, EXTENDED RELEASE ORAL DAILY
Qty: 30 TABLET | Refills: 3 | Status: SHIPPED | OUTPATIENT
Start: 2025-08-02

## 2025-08-02 RX ORDER — INSULIN LISPRO 100 [IU]/ML
0-18 INJECTION, SOLUTION INTRAVENOUS; SUBCUTANEOUS
Status: DISCONTINUED | OUTPATIENT
Start: 2025-08-02 | End: 2025-08-02 | Stop reason: HOSPADM

## 2025-08-02 RX ORDER — ROPINIROLE 0.25 MG/1
0.25 TABLET, FILM COATED ORAL NIGHTLY
Status: DISCONTINUED | OUTPATIENT
Start: 2025-08-02 | End: 2025-08-02 | Stop reason: HOSPADM

## 2025-08-02 RX ORDER — METOPROLOL SUCCINATE 25 MG/1
25 TABLET, EXTENDED RELEASE ORAL DAILY
Status: DISCONTINUED | OUTPATIENT
Start: 2025-08-02 | End: 2025-08-02 | Stop reason: HOSPADM

## 2025-08-02 RX ORDER — MAGNESIUM SULFATE IN WATER 40 MG/ML
2000 INJECTION, SOLUTION INTRAVENOUS ONCE
Status: COMPLETED | OUTPATIENT
Start: 2025-08-02 | End: 2025-08-02

## 2025-08-02 RX ORDER — ENOXAPARIN SODIUM 100 MG/ML
30 INJECTION SUBCUTANEOUS 2 TIMES DAILY
Status: DISCONTINUED | OUTPATIENT
Start: 2025-08-02 | End: 2025-08-02 | Stop reason: HOSPADM

## 2025-08-02 RX ORDER — PREGABALIN 75 MG/1
150 CAPSULE ORAL 2 TIMES DAILY
Status: DISCONTINUED | OUTPATIENT
Start: 2025-08-02 | End: 2025-08-02 | Stop reason: HOSPADM

## 2025-08-02 RX ORDER — TICAGRELOR 90 MG/1
90 TABLET, FILM COATED ORAL 2 TIMES DAILY
Status: DISCONTINUED | OUTPATIENT
Start: 2025-08-02 | End: 2025-08-02 | Stop reason: HOSPADM

## 2025-08-02 RX ORDER — LOSARTAN POTASSIUM 25 MG/1
25 TABLET ORAL DAILY
Status: DISCONTINUED | OUTPATIENT
Start: 2025-08-02 | End: 2025-08-02 | Stop reason: HOSPADM

## 2025-08-02 RX ORDER — ACETAMINOPHEN 325 MG/1
650 TABLET ORAL EVERY 6 HOURS PRN
Status: DISCONTINUED | OUTPATIENT
Start: 2025-08-02 | End: 2025-08-02 | Stop reason: HOSPADM

## 2025-08-02 RX ORDER — SODIUM CHLORIDE 0.9 % (FLUSH) 0.9 %
10 SYRINGE (ML) INJECTION PRN
Status: DISCONTINUED | OUTPATIENT
Start: 2025-08-02 | End: 2025-08-02 | Stop reason: HOSPADM

## 2025-08-02 RX ORDER — PANTOPRAZOLE SODIUM 40 MG/1
40 TABLET, DELAYED RELEASE ORAL
Status: DISCONTINUED | OUTPATIENT
Start: 2025-08-02 | End: 2025-08-02 | Stop reason: HOSPADM

## 2025-08-02 RX ORDER — MAGNESIUM SULFATE IN WATER 40 MG/ML
2000 INJECTION, SOLUTION INTRAVENOUS PRN
Status: DISCONTINUED | OUTPATIENT
Start: 2025-08-02 | End: 2025-08-02 | Stop reason: HOSPADM

## 2025-08-02 RX ORDER — GLUCAGON 1 MG/ML
1 KIT INJECTION PRN
Status: DISCONTINUED | OUTPATIENT
Start: 2025-08-02 | End: 2025-08-02 | Stop reason: HOSPADM

## 2025-08-02 RX ORDER — SODIUM CHLORIDE 9 MG/ML
INJECTION, SOLUTION INTRAVENOUS PRN
Status: DISCONTINUED | OUTPATIENT
Start: 2025-08-02 | End: 2025-08-02 | Stop reason: HOSPADM

## 2025-08-02 RX ORDER — ACETAMINOPHEN 650 MG/1
650 SUPPOSITORY RECTAL EVERY 6 HOURS PRN
Status: DISCONTINUED | OUTPATIENT
Start: 2025-08-02 | End: 2025-08-02 | Stop reason: HOSPADM

## 2025-08-02 RX ORDER — ASPIRIN 81 MG/1
81 TABLET ORAL DAILY
Status: DISCONTINUED | OUTPATIENT
Start: 2025-08-02 | End: 2025-08-02 | Stop reason: HOSPADM

## 2025-08-02 RX ORDER — CLONIDINE HYDROCHLORIDE 0.1 MG/1
0.1 TABLET ORAL
Status: DISCONTINUED | OUTPATIENT
Start: 2025-08-03 | End: 2025-08-02 | Stop reason: HOSPADM

## 2025-08-02 RX ORDER — INSULIN LISPRO 100 [IU]/ML
9 INJECTION, SOLUTION INTRAVENOUS; SUBCUTANEOUS
Status: DISCONTINUED | OUTPATIENT
Start: 2025-08-02 | End: 2025-08-02 | Stop reason: HOSPADM

## 2025-08-02 RX ORDER — CLONIDINE HYDROCHLORIDE 0.1 MG/1
0.1 TABLET ORAL 2 TIMES DAILY
Status: DISCONTINUED | OUTPATIENT
Start: 2025-08-02 | End: 2025-08-02

## 2025-08-02 RX ORDER — LIDOCAINE 4 G/G
1 PATCH TOPICAL DAILY
Status: DISCONTINUED | OUTPATIENT
Start: 2025-08-02 | End: 2025-08-02 | Stop reason: HOSPADM

## 2025-08-02 RX ORDER — POTASSIUM CHLORIDE 1500 MG/1
40 TABLET, EXTENDED RELEASE ORAL PRN
Status: DISCONTINUED | OUTPATIENT
Start: 2025-08-02 | End: 2025-08-02 | Stop reason: HOSPADM

## 2025-08-02 RX ORDER — POTASSIUM CHLORIDE 7.45 MG/ML
10 INJECTION INTRAVENOUS PRN
Status: DISCONTINUED | OUTPATIENT
Start: 2025-08-02 | End: 2025-08-02 | Stop reason: HOSPADM

## 2025-08-02 RX ORDER — ALPRAZOLAM 0.25 MG
0.5 TABLET ORAL NIGHTLY PRN
Status: DISCONTINUED | OUTPATIENT
Start: 2025-08-02 | End: 2025-08-02 | Stop reason: HOSPADM

## 2025-08-02 RX ORDER — SODIUM CHLORIDE 0.9 % (FLUSH) 0.9 %
10 SYRINGE (ML) INJECTION EVERY 12 HOURS SCHEDULED
Status: DISCONTINUED | OUTPATIENT
Start: 2025-08-02 | End: 2025-08-02 | Stop reason: HOSPADM

## 2025-08-02 RX ORDER — INSULIN GLARGINE 100 [IU]/ML
26 INJECTION, SOLUTION SUBCUTANEOUS NIGHTLY
Status: DISCONTINUED | OUTPATIENT
Start: 2025-08-02 | End: 2025-08-02 | Stop reason: HOSPADM

## 2025-08-02 RX ADMIN — FLUOXETINE HYDROCHLORIDE 40 MG: 20 CAPSULE ORAL at 09:36

## 2025-08-02 RX ADMIN — ASPIRIN 81 MG: 81 TABLET, COATED ORAL at 12:03

## 2025-08-02 RX ADMIN — INSULIN LISPRO 3 UNITS: 100 INJECTION, SOLUTION INTRAVENOUS; SUBCUTANEOUS at 16:23

## 2025-08-02 RX ADMIN — HYDROCODONE BITARTRATE AND ACETAMINOPHEN 1 TABLET: 10; 325 TABLET ORAL at 12:12

## 2025-08-02 RX ADMIN — HYDROCODONE BITARTRATE AND ACETAMINOPHEN 1 TABLET: 10; 325 TABLET ORAL at 18:57

## 2025-08-02 RX ADMIN — ALPRAZOLAM 0.5 MG: 0.25 TABLET ORAL at 12:59

## 2025-08-02 RX ADMIN — ENOXAPARIN SODIUM 30 MG: 30 INJECTION SUBCUTANEOUS at 12:04

## 2025-08-02 RX ADMIN — HYDROCODONE BITARTRATE AND ACETAMINOPHEN 1 TABLET: 10; 325 TABLET ORAL at 05:44

## 2025-08-02 RX ADMIN — METOPROLOL SUCCINATE 25 MG: 25 TABLET, FILM COATED, EXTENDED RELEASE ORAL at 12:09

## 2025-08-02 RX ADMIN — INSULIN LISPRO 9 UNITS: 100 INJECTION, SOLUTION INTRAVENOUS; SUBCUTANEOUS at 16:24

## 2025-08-02 RX ADMIN — PANTOPRAZOLE SODIUM 40 MG: 40 TABLET, DELAYED RELEASE ORAL at 05:41

## 2025-08-02 RX ADMIN — CLONIDINE HYDROCHLORIDE 0.1 MG: 0.1 TABLET ORAL at 12:04

## 2025-08-02 RX ADMIN — SODIUM CHLORIDE, PRESERVATIVE FREE 10 ML: 5 INJECTION INTRAVENOUS at 12:12

## 2025-08-02 RX ADMIN — PREGABALIN 150 MG: 75 CAPSULE ORAL at 09:38

## 2025-08-02 RX ADMIN — MAGNESIUM SULFATE HEPTAHYDRATE 2000 MG: 40 INJECTION, SOLUTION INTRAVENOUS at 13:00

## 2025-08-02 RX ADMIN — TICAGRELOR 90 MG: 90 TABLET ORAL at 09:38

## 2025-08-02 RX ADMIN — Medication 400 MG: at 12:08

## 2025-08-02 ASSESSMENT — ENCOUNTER SYMPTOMS
PHOTOPHOBIA: 0
DIARRHEA: 0
STRIDOR: 0
NAUSEA: 0
CHOKING: 0
COLOR CHANGE: 0
VOMITING: 0
CONSTIPATION: 0
SHORTNESS OF BREATH: 1
ABDOMINAL PAIN: 0

## 2025-08-02 ASSESSMENT — PAIN SCALES - GENERAL
PAINLEVEL_OUTOF10: 10
PAINLEVEL_OUTOF10: 8
PAINLEVEL_OUTOF10: 6

## 2025-08-02 ASSESSMENT — PAIN DESCRIPTION - LOCATION
LOCATION: BACK
LOCATION: HIP

## 2025-08-02 NOTE — H&P
History & Physicial  Javier Justin Sr.  [id number]  1950 08/02/25  Primary Care:  Raúl Farias MD  [address] [facility] [address] [facility] [id number]        CC: Chest pressure.     HPI:  Patient is a 75 year old male who presented to [facility] ER due to chest pressure. He states that yesterday he was brushing his son's dog and then lifted 2 50 lbs bags of corn to feed the deer. He states that when he sat down he felt very sweaty and had chest pressure. He went inside and told his wife how hot he was. He went into the car to turn on the A/C and to ease his anxiety. The chest pressure did not improve. He had shortness of breath and nausea with the diaphoresis. He went back home and his wife stated he did not look good. He drove to ER. Patient denies any radiation of the pain into his arm or jaw. In ER patient was found to have initial troponin at 46. Repeat at 36 with mild VALE. He was given aspirin and was transferred to [place] for further evaluation. Patient this am laying in bed. He denies any further episodes of chest pain. He states that he has noticed that when he goes to do anything physical it takes him more time. He has two loose screws from hardware in his back.     Prior to Visit Medications    Medication Sig Taking? Authorizing Provider   Continuous Glucose Sensor (DEXCOM G7 SENSOR) MISC Change sensor every 10 days  Jessee Gallego APRN - CNS   naproxen (NAPROSYN) 500 MG tablet Take 1 tablet by mouth 2 times daily for 7 days  Teddy Gilbert MD   Tirzepatide (MOUNJARO) 5 MG/0.5ML SOAJ pen Inject 5 mg into the skin every 7 days  Neida Jacobsen APRN - CNP   Magnesium Oxide 420 MG TABS Take 1 tablet by mouth daily  Teddy Gilbert MD   HYDROcodone-acetaminophen (NORCO)  MG per tablet Take 1 tablet by mouth every 6 hours as needed for Pain.  Provider, MD Marquita   aspirin 81 MG EC tablet Take 1 tablet by mouth daily  Jeffry Mojica MD

## 2025-08-02 NOTE — CONSULTS
Inpatient Cardiology Consultation      Reason for Consult: Chest pain    Consulting Physician: Dr Paul    Requesting Physician:  Kelsey Velásquez, SHIRA - CNP    Date of Consultation: 8/2/2025    HISTORY OF PRESENT ILLNESS:   Patient is a 75-year-old male who is prior known to [facility] cardiology and to follow-up with Dr. Noel.  He was last seen outpatient 4/10/2025 after recent cardiac stenting.  No complaints at that time and patient was referred to cardiac rehab.    HPI:  Patient presented to [place] ER 8/1/2025 for chest pain and shortness of breath prior to arrival for 1.5 hours while he was moving deer feed he developed chest pain.  Reported it was pressure-like sensation like someone standing on the center of his chest.  Patient reported he was diaphoretic.  Labs revealed mild VALE of 1.4, mildly elevated downtrending troponin.  Chest x-ray and CTA negative.  Patient was given aspirin, fentanyl, hydrocodone, and lorazepam at [facility] prior to transfer.  VS upon arrival to [place] 97.6, 18 respirations, 101 pulse, 130/72, 98% room air.  Labs: Potassium 3.8, BUN 24, creatinine 1.4 > 1.2, GFR 63, glucose 147, calcium 9.4, troponin 46 > 36 (3/6: 26), cholesterol 120, HDL 29, LDL 58, triglycerides 164, BNP <36, albumin 3.6, WBC 7.7, H&H 11.2/34.7, platelet 225, D-dimer 496  CXR: No acute process  CTA pulmonary: No PE.  Mild bronchial wall thickening potentially due to pulmonary vascular congestion reactive airway or bronchitis.  Minimal paraseptal emphysema.  Possible circumferential wall thickening of distal thoracic esophagus potential esophagitis.    Upon assessment today 8/2/2025 patient is lying [place] in hospital bed on room air.  Family at bedside.  Patient tells me yesterday he was working outside brushing his dog off and while bending over to  the hair became lightheaded.  He was then moving deer feet and became diaphoretic and shortness of breath and went inside to rest.   of 1.4 creatinine on arrival.  No acute ischemic EKG changes.  Currently asymptomatic.  Mild VALE creatinine 1.4 on arrival, resolving  Hypomagnesemia  CAD: 3/7/2025 successful stenting of 2 sequential LAD lesions.  Chronically elevated trop 20-30s  Ascending aorta dilated 4.6 cm per CTA of the pulmonary 3/2025.  Read at 4.0 cm per Dr. Croft with follow-up outpatient in 1 year recommended  HTN  DM type II, insulin-requiring and on Glucophage; neuropathy  Mixed HLD; not on statin   Former tobacco abuse 1 pack a day for 20 years quit in 2014  Morbid Obesity BMI 37  ? PHYLICIA (test \"years ago negative\"); snoring & PND  DVT RLE popliteal and calf veins 11/2024 (no PE); provoked 9 days after back OR.  Vena cava filter removed 1/16/2025  Lung nodule noncalcified 6 mm in right minor fissure noted on CTA pulmonary 11/2024  CKD baseline CR 1.0-1.4  Chronic anemia (baseline 8.6 - 11.8)   Mildly elevated LFTs 5/2024 ALT AST 51; hepatomegaly with hepatic steatosis/fatty liver infiltration in addition right hepatic lobe lesion questionable cyst or hemangioma on CTA of the pulmonary 11/2024  Sciatica/chronic back pain; with prescription for Norco, Lyrica, naproxen  Carpal tunnel - bilateral s/p OR  OA status post left total knee replacement 2018; R total knee 2019  Poor dentition   Poor functional capacity   Anxiety/depression    Plan:  Recommend tapering clonidine.  Will decrease to 0.1 daily and recommend discontinuation outpatient.  Start Imdur 30 mg daily.  Give 2 g IV magnesium.  Continue other medications as prescribed.  No advanced cardiac testing at this time unless recurrent anginal-like chest pain.  Continue telemetry monitoring.  Monitor electrolytes: Keep potassium >4.0 and magnesium >2.0.  Rest per primary service.  Case discussed with Dr Paul.  Further recommendations to follow as per above and per Dr Paul.    Electronically signed by SHIRA Casper CNP on 8/2/2025 at 5:29 PM

## 2025-08-02 NOTE — PROGRESS NOTES
4 Eyes Skin Assessment     NAME:  Javier Justin .  YOB: 1950  MEDICAL RECORD NUMBER:  [id number]    The patient is being assessed for  Admission    I agree that at least one RN has performed a thorough Head to Toe Skin Assessment on the patient. ALL assessment sites listed below have been assessed.      Areas assessed by both nurses:    Head, Face, Ears, Shoulders, Back, Chest, Arms, Elbows, Hands, Sacrum. Buttock, Coccyx, Ischium, Legs. Feet and Heels, and Under Medical Devices         Does the Patient have a Wound? No noted wound(s)       Randell Prevention initiated by RN: No  Wound Care Orders initiated by RN: No    For hospital-acquired stage 1 & 2 and ALL Stage 3,4, Unstageable, DTI, NWPT, and Complex wounds: place order “IP Wound Care/Ostomy Nurse Eval and Treat” by RN under : No    New Ostomies, if present place, Ostomy referral order under : No     Nurse 1 eSignature: Electronically signed by FITZ ROBB RN on 8/2/25 at 1:13 AM EDT    **SHARE this note so that the co-signing nurse can place an eSignature**    Nurse 2 eSignature: Electronically signed by Shanna Copeland RN on 8/2/25 at 1:15 AM EDT

## 2025-08-02 NOTE — DISCHARGE SUMMARY
Discharge Summary  Patient ID:  Javier Justin  [id number]  75 y.o. 1950 male  Raúl Farias MD        Admit date: 8/2/2025    Discharge date and time:  8/2/2025  7:35 PM      Activity level: As tolerated  Diet: Diabetic  Labs: None   Disposition:Home  Condition on Discharge:Stable  DME: None     Admit Diagnoses:   Patient Active Problem List   Diagnosis    Low back pain with right-sided sciatica    Type 2 diabetes mellitus with hyperglycemia, with long-term current use of insulin ([facility])    Essential hypertension    Pure hypercholesterolemia    Depression    Chronic low back pain    Sciatica    Acute pain of right knee    Chest pain    Class 2 severe obesity with serious comorbidity and body mass index (BMI) of 38.0 to 38.9 in adult ([facility])    Acute kidney injury    Low back pain    Acute deep vein thrombosis (DVT) of popliteal vein of right lower extremity ([facility])    S/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    Pleurisy    Dizziness    Hypomagnesemia    Ascending aorta dilatation 4.6cm 3/2025    Microcytic anemia    Gastroesophageal reflux disease with esophagitis without hemorrhage    CAD (coronary artery disease)    Constipation       Discharge Diagnoses: Principal Problem:    Chest pain  Resolved Problems:    * No resolved hospital problems. *      Consults:  IP CONSULT TO CARDIOLOGY    Procedures:   None     Hospital Course:   Patient is a 75 year old male who presented to [facility] ER due to chest pressure. He states that yesterday he was brushing his son's dog and then lifted 2 50 lbs bags of corn to feed the deer. He states that when he sat down he felt very sweaty and had chest pressure. He went inside and told his wife how hot he was. He went into the car to turn on the A/C and to ease his anxiety. The chest pressure did not improve. He had shortness of breath and nausea with the diaphoresis. He went back home and his wife stated he did not look good. He drove to ER. Patient

## 2025-08-02 NOTE — ED PROVIDER NOTES
Department of Emergency Medicine   ED  Provider Note  Admit Date/RoomTime: 8/1/2025  8:37 PM  ED Room: 04/04              hospitals     Javier Justin Sr. is a 75 y.o. male with a PMHx significant for CAD, HLD, HTN, Obesity, GERD who presents for evaluation of chest pain, shortness of breath, beginning prior to arrival. The complaint has been persistent, moderate in severity, and worsened by nothing.   The patient states that about 1.5 hours prior to arrival he was moving deer feed about when he started to develop discomfort in the chest. Notes it was a pressure like someone was standing on the center of his chest.  There was no radiation of the pain, he was not nauseated, but he was diaphoretic.   States now that is just a dull ache.     Review of Systems   Constitutional:  Negative for chills and fever.   HENT:  Negative for ear pain, sinus pressure and sore throat.    Eyes:  Negative for pain, discharge and redness.   Respiratory:  Positive for chest tightness and shortness of breath. Negative for cough and wheezing.    Cardiovascular:  Positive for chest pain.   Gastrointestinal:  Negative for abdominal pain, diarrhea, nausea and vomiting.   Genitourinary:  Negative for dysuria and frequency.   Musculoskeletal:  Negative for arthralgias and back pain.   Skin:  Negative for rash and wound.   Neurological:  Negative for weakness and headaches.   Hematological:  Negative for adenopathy.   All other systems reviewed and are negative.       Physical Exam  Vitals and nursing note reviewed.   Constitutional:       General: He is not in acute distress.     Appearance: Normal appearance. He is well-developed. He is not ill-appearing.   HENT:      Head: Normocephalic and atraumatic.      Right Ear: External ear normal.      Left Ear: External ear normal.   Eyes:      General:         Right eye: No discharge.         Left eye: No discharge.      Extraocular Movements: Extraocular movements intact.      Conjunctiva/sclera:

## 2025-08-02 NOTE — DISCHARGE INSTR - COC
Continuity of Care Form    Patient Name: Javier Justin Sr.   :  1950  MRN:  [id number]    Admit date:  2025  Discharge date:  ***    Code Status Order: Full Code   Advance Directives:     Admitting Physician:  Ras Rinaldi DO  PCP: Raúl Farias MD    Discharging Nurse: ***  Discharging Hospital Unit/Room#: 0426/0426-A  Discharging Unit Phone Number: ***    Emergency Contact:   Extended Emergency Contact Information  Primary Emergency Contact: Sunita Justin  Address: [facility] [address]  Home Phone: [phone]  Mobile Phone: [phone]  Relation: Spouse  Preferred language: English   needed? No    Past Surgical History:  Past Surgical History:   Procedure Laterality Date    BACK SURGERY      CARDIAC PROCEDURE N/A 3/7/2025    Left heart cath / coronary angiography performed by Raul Noel DO at [facility]    CARDIAC PROCEDURE N/A 3/7/2025    Fractional flow reserve (FFR) performed by Raul Noel DO at [facility]    CARDIAC PROCEDURE N/A 3/7/2025    Angioplasty coronary performed by Raul Noel DO at [facility]    CARDIAC PROCEDURE N/A 3/7/2025    Insert stent julia coronary performed by Raul Noel DO at [facility]    CARPAL TUNNEL RELEASE      HIP SURGERY Right     INVASIVE VASCULAR N/A 2025    Remove vena cava filter performed by Allen Samson MD at [facility]    KNEE SURGERY      OTHER SURGICAL HISTORY  2014    lumbar myelogram    VENA CAVA FILTER PLACEMENT N/A 2024    VENA CAVA FILTER INSERTION performed by Allen Samson MD at [facility] OR       Immunization History:   Immunization History   Administered Date(s) Administered    COVID-19, PFIZER Bivalent, DO NOT Dilute, (age 12y+), IM, 30 mcg/0.3 mL 2022    COVID-19, PFIZER PURPLE top, DILUTE for use, (age 12 y+), 30mcg/0.3mL 2021, 2021, 2021    COVID-19, PFIZER,

## 2025-08-02 NOTE — PLAN OF CARE
Problem: Chronic Conditions and Co-morbidities  Goal: Patient's chronic conditions and co-morbidity symptoms are monitored and maintained or improved  8/2/2025 0923 by Lu Briseno, RN  Outcome: Progressing     Problem: Safety - Adult  Goal: Free from fall injury  8/2/2025 0923 by Lu Briseno, RN  Outcome: Progressing

## 2025-08-02 NOTE — DISCHARGE INSTR - DIET

## 2025-08-02 NOTE — CARE COORDINATION
Internal Medicine On-call Care Coordination Note    I was called by the ED physician because they recommended admission for this patient and we cover their PCP.  The history as I understand it after discussion with the ED physician is as follows:    Chest heaviness with exertion  EKG negative acute  CXR negative    I placed admission orders.  Including:    General admission orders  Reconciled home meds  Cardiology consult  NPO  Lipid panel    Dr. Rinaldi, or our coverage will see the patient tomorrow for H&P.    Electronically signed by SHIRA Cheema CNP on 8/1/2025 at 11:02 PM

## 2025-08-03 LAB
EKG ATRIAL RATE: 104 BPM
EKG P AXIS: 34 DEGREES
EKG P-R INTERVAL: 212 MS
EKG Q-T INTERVAL: 358 MS
EKG QRS DURATION: 82 MS
EKG QTC CALCULATION (BAZETT): 470 MS
EKG R AXIS: -47 DEGREES
EKG T AXIS: 47 DEGREES
EKG VENTRICULAR RATE: 104 BPM

## 2025-08-04 ENCOUNTER — TELEPHONE (OUTPATIENT)
Dept: CARDIOLOGY CLINIC | Age: 75
End: 2025-08-04

## 2025-08-04 PROCEDURE — 93010 ELECTROCARDIOGRAM REPORT: CPT | Performed by: INTERNAL MEDICINE

## 2025-08-05 DIAGNOSIS — I25.10 CORONARY ARTERY DISEASE DUE TO LIPID RICH PLAQUE: ICD-10-CM

## 2025-08-05 DIAGNOSIS — E11.65 TYPE 2 DIABETES MELLITUS WITH HYPERGLYCEMIA, WITH LONG-TERM CURRENT USE OF INSULIN (HCC): ICD-10-CM

## 2025-08-05 DIAGNOSIS — I25.83 CORONARY ARTERY DISEASE DUE TO LIPID RICH PLAQUE: ICD-10-CM

## 2025-08-05 DIAGNOSIS — Z79.4 TYPE 2 DIABETES MELLITUS WITH HYPERGLYCEMIA, WITH LONG-TERM CURRENT USE OF INSULIN (HCC): ICD-10-CM

## 2025-08-05 DIAGNOSIS — R07.9 CHEST PAIN, UNSPECIFIED TYPE: Primary | ICD-10-CM

## 2025-08-05 RX ORDER — REGADENOSON 0.08 MG/ML
0.4 INJECTION, SOLUTION INTRAVENOUS
OUTPATIENT
Start: 2025-08-05

## 2025-08-06 RX ORDER — INSULIN LISPRO 100 [IU]/ML
INJECTION, SOLUTION INTRAVENOUS; SUBCUTANEOUS
Qty: 75 ML | Refills: 1 | Status: SHIPPED | OUTPATIENT
Start: 2025-08-06

## 2025-08-08 ENCOUNTER — TELEPHONE (OUTPATIENT)
Dept: CARDIOLOGY | Age: 75
End: 2025-08-08

## 2025-08-11 ENCOUNTER — OFFICE VISIT (OUTPATIENT)
Dept: ENDOCRINOLOGY | Age: 75
End: 2025-08-11
Payer: MEDICARE

## 2025-08-11 VITALS
HEIGHT: 69 IN | RESPIRATION RATE: 18 BRPM | WEIGHT: 260 LBS | SYSTOLIC BLOOD PRESSURE: 100 MMHG | OXYGEN SATURATION: 98 % | DIASTOLIC BLOOD PRESSURE: 63 MMHG | BODY MASS INDEX: 38.51 KG/M2 | HEART RATE: 92 BPM | TEMPERATURE: 96.5 F

## 2025-08-11 DIAGNOSIS — E11.65 TYPE 2 DIABETES MELLITUS WITH HYPERGLYCEMIA, WITH LONG-TERM CURRENT USE OF INSULIN (HCC): Primary | ICD-10-CM

## 2025-08-11 DIAGNOSIS — Z91.119 DIETARY NONCOMPLIANCE: ICD-10-CM

## 2025-08-11 DIAGNOSIS — Z79.4 TYPE 2 DIABETES MELLITUS WITH HYPERGLYCEMIA, WITH LONG-TERM CURRENT USE OF INSULIN (HCC): Primary | ICD-10-CM

## 2025-08-11 LAB — HBA1C MFR BLD: 7.4 %

## 2025-08-11 PROCEDURE — 1159F MED LIST DOCD IN RCRD: CPT | Performed by: INTERNAL MEDICINE

## 2025-08-11 PROCEDURE — 2022F DILAT RTA XM EVC RTNOPTHY: CPT | Performed by: INTERNAL MEDICINE

## 2025-08-11 PROCEDURE — 83036 HEMOGLOBIN GLYCOSYLATED A1C: CPT | Performed by: INTERNAL MEDICINE

## 2025-08-11 PROCEDURE — 3017F COLORECTAL CA SCREEN DOC REV: CPT | Performed by: INTERNAL MEDICINE

## 2025-08-11 PROCEDURE — G8427 DOCREV CUR MEDS BY ELIG CLIN: HCPCS | Performed by: INTERNAL MEDICINE

## 2025-08-11 PROCEDURE — 3078F DIAST BP <80 MM HG: CPT | Performed by: INTERNAL MEDICINE

## 2025-08-11 PROCEDURE — G2211 COMPLEX E/M VISIT ADD ON: HCPCS | Performed by: INTERNAL MEDICINE

## 2025-08-11 PROCEDURE — 1036F TOBACCO NON-USER: CPT | Performed by: INTERNAL MEDICINE

## 2025-08-11 PROCEDURE — 99214 OFFICE O/P EST MOD 30 MIN: CPT | Performed by: INTERNAL MEDICINE

## 2025-08-11 PROCEDURE — 1123F ACP DISCUSS/DSCN MKR DOCD: CPT | Performed by: INTERNAL MEDICINE

## 2025-08-11 PROCEDURE — G8417 CALC BMI ABV UP PARAM F/U: HCPCS | Performed by: INTERNAL MEDICINE

## 2025-08-11 PROCEDURE — 3074F SYST BP LT 130 MM HG: CPT | Performed by: INTERNAL MEDICINE

## 2025-08-11 PROCEDURE — 3051F HG A1C>EQUAL 7.0%<8.0%: CPT | Performed by: INTERNAL MEDICINE

## 2025-08-11 RX ORDER — TIRZEPATIDE 7.5 MG/.5ML
7.5 INJECTION, SOLUTION SUBCUTANEOUS WEEKLY
Qty: 2 ML | Refills: 11 | Status: SHIPPED | OUTPATIENT
Start: 2025-08-11

## 2025-08-11 RX ORDER — ACYCLOVIR 400 MG/1
TABLET ORAL
Qty: 3 EACH | Refills: 11 | Status: SHIPPED | OUTPATIENT
Start: 2025-08-11

## 2025-08-13 DIAGNOSIS — Z79.4 TYPE 2 DIABETES MELLITUS WITH HYPERGLYCEMIA, WITH LONG-TERM CURRENT USE OF INSULIN (HCC): Primary | ICD-10-CM

## 2025-08-13 DIAGNOSIS — E11.65 TYPE 2 DIABETES MELLITUS WITH HYPERGLYCEMIA, WITH LONG-TERM CURRENT USE OF INSULIN (HCC): Primary | ICD-10-CM

## 2025-08-13 RX ORDER — ACYCLOVIR 400 MG/1
TABLET ORAL
Qty: 1 EACH | Refills: 0 | Status: SHIPPED | OUTPATIENT
Start: 2025-08-13

## 2025-08-19 ENCOUNTER — TELEPHONE (OUTPATIENT)
Dept: CARDIOLOGY | Age: 75
End: 2025-08-19

## 2025-08-21 ENCOUNTER — HOSPITAL ENCOUNTER (OUTPATIENT)
Dept: CARDIOLOGY | Age: 75
Discharge: HOME | End: 2025-08-23
Payer: MEDICARE

## 2025-08-21 ENCOUNTER — TELEPHONE (OUTPATIENT)
Dept: CARDIOLOGY CLINIC | Age: 75
End: 2025-08-21

## 2025-08-21 VITALS
HEIGHT: 69 IN | RESPIRATION RATE: 16 BRPM | HEART RATE: 96 BPM | SYSTOLIC BLOOD PRESSURE: 112 MMHG | WEIGHT: 260 LBS | DIASTOLIC BLOOD PRESSURE: 70 MMHG | BODY MASS INDEX: 38.51 KG/M2

## 2025-08-21 DIAGNOSIS — R07.9 CHEST PAIN, UNSPECIFIED TYPE: ICD-10-CM

## 2025-08-21 DIAGNOSIS — I25.10 CORONARY ARTERY DISEASE DUE TO LIPID RICH PLAQUE: ICD-10-CM

## 2025-08-21 DIAGNOSIS — I25.83 CORONARY ARTERY DISEASE DUE TO LIPID RICH PLAQUE: ICD-10-CM

## 2025-08-21 LAB
ECHO BSA: 2.4 M2
NUC STRESS EJECTION FRACTION: 65 %
STRESS BASELINE DIAS BP: 70 MMHG
STRESS BASELINE HR: 102 BPM
STRESS BASELINE SYS BP: 112 MMHG
STRESS ESTIMATED WORKLOAD: 1 METS
STRESS PEAK DIAS BP: 70 MMHG
STRESS PEAK SYS BP: 120 MMHG
STRESS PERCENT HR ACHIEVED: 76 %
STRESS POST PEAK HR: 112 BPM
STRESS RATE PRESSURE PRODUCT: NORMAL BPM*MMHG
STRESS TARGET HR: 147 BPM

## 2025-08-21 PROCEDURE — 3430000000 HC RX DIAGNOSTIC RADIOPHARMACEUTICAL: Performed by: INTERNAL MEDICINE

## 2025-08-21 PROCEDURE — 2500000003 HC RX 250 WO HCPCS: Performed by: INTERNAL MEDICINE

## 2025-08-21 PROCEDURE — A9500 TC99M SESTAMIBI: HCPCS | Performed by: INTERNAL MEDICINE

## 2025-08-21 PROCEDURE — 93017 CV STRESS TEST TRACING ONLY: CPT

## 2025-08-21 PROCEDURE — 6360000002 HC RX W HCPCS: Performed by: INTERNAL MEDICINE

## 2025-08-21 RX ORDER — TETRAKIS(2-METHOXYISOBUTYLISOCYANIDE)COPPER(I) TETRAFLUOROBORATE 1 MG/ML
11.1 INJECTION, POWDER, LYOPHILIZED, FOR SOLUTION INTRAVENOUS
Status: COMPLETED | OUTPATIENT
Start: 2025-08-21 | End: 2025-08-21

## 2025-08-21 RX ORDER — SODIUM CHLORIDE 0.9 % (FLUSH) 0.9 %
10 SYRINGE (ML) INJECTION PRN
Status: DISCONTINUED | OUTPATIENT
Start: 2025-08-21 | End: 2025-08-24 | Stop reason: HOSPADM

## 2025-08-21 RX ORDER — HYDROCODONE BITARTRATE 20 MG/1
20 TABLET, EXTENDED RELEASE ORAL PRN
COMMUNITY
Start: 2025-07-23

## 2025-08-21 RX ORDER — TETRAKIS(2-METHOXYISOBUTYLISOCYANIDE)COPPER(I) TETRAFLUOROBORATE 1 MG/ML
31.8 INJECTION, POWDER, LYOPHILIZED, FOR SOLUTION INTRAVENOUS
Status: COMPLETED | OUTPATIENT
Start: 2025-08-21 | End: 2025-08-21

## 2025-08-21 RX ORDER — REGADENOSON 0.08 MG/ML
0.4 INJECTION, SOLUTION INTRAVENOUS
Status: COMPLETED | OUTPATIENT
Start: 2025-08-21 | End: 2025-08-21

## 2025-08-21 RX ORDER — NALOXEGOL OXALATE 25 MG/1
TABLET, FILM COATED ORAL
COMMUNITY
Start: 2025-07-31

## 2025-08-21 RX ADMIN — SODIUM CHLORIDE, PRESERVATIVE FREE 10 ML: 5 INJECTION INTRAVENOUS at 07:06

## 2025-08-21 RX ADMIN — SODIUM CHLORIDE, PRESERVATIVE FREE 10 ML: 5 INJECTION INTRAVENOUS at 08:38

## 2025-08-21 RX ADMIN — Medication 31.8 MILLICURIE: at 08:37

## 2025-08-21 RX ADMIN — Medication 11.1 MILLICURIE: at 07:05

## 2025-08-21 RX ADMIN — SODIUM CHLORIDE, PRESERVATIVE FREE 10 ML: 5 INJECTION INTRAVENOUS at 08:36

## 2025-08-21 RX ADMIN — REGADENOSON 0.4 MG: 0.08 INJECTION, SOLUTION INTRAVENOUS at 08:37

## 2025-08-26 ENCOUNTER — OFFICE VISIT (OUTPATIENT)
Dept: CARDIOLOGY CLINIC | Age: 75
End: 2025-08-26
Payer: MEDICARE

## 2025-08-26 VITALS
DIASTOLIC BLOOD PRESSURE: 64 MMHG | RESPIRATION RATE: 18 BRPM | HEIGHT: 69 IN | WEIGHT: 273.7 LBS | HEART RATE: 76 BPM | BODY MASS INDEX: 40.54 KG/M2 | TEMPERATURE: 97 F | SYSTOLIC BLOOD PRESSURE: 112 MMHG | OXYGEN SATURATION: 96 %

## 2025-08-26 DIAGNOSIS — I25.10 CORONARY ARTERY DISEASE DUE TO LIPID RICH PLAQUE: Primary | ICD-10-CM

## 2025-08-26 DIAGNOSIS — I25.83 CORONARY ARTERY DISEASE DUE TO LIPID RICH PLAQUE: Primary | ICD-10-CM

## 2025-08-26 DIAGNOSIS — I77.810 ASCENDING AORTA DILATATION: ICD-10-CM

## 2025-08-26 DIAGNOSIS — I10 ESSENTIAL HYPERTENSION: ICD-10-CM

## 2025-08-26 PROCEDURE — 3017F COLORECTAL CA SCREEN DOC REV: CPT | Performed by: INTERNAL MEDICINE

## 2025-08-26 PROCEDURE — 3078F DIAST BP <80 MM HG: CPT | Performed by: INTERNAL MEDICINE

## 2025-08-26 PROCEDURE — G8417 CALC BMI ABV UP PARAM F/U: HCPCS | Performed by: INTERNAL MEDICINE

## 2025-08-26 PROCEDURE — 99214 OFFICE O/P EST MOD 30 MIN: CPT | Performed by: INTERNAL MEDICINE

## 2025-08-26 PROCEDURE — 1036F TOBACCO NON-USER: CPT | Performed by: INTERNAL MEDICINE

## 2025-08-26 PROCEDURE — G2211 COMPLEX E/M VISIT ADD ON: HCPCS | Performed by: INTERNAL MEDICINE

## 2025-08-26 PROCEDURE — 93000 ELECTROCARDIOGRAM COMPLETE: CPT | Performed by: INTERNAL MEDICINE

## 2025-08-26 PROCEDURE — 3074F SYST BP LT 130 MM HG: CPT | Performed by: INTERNAL MEDICINE

## 2025-08-26 PROCEDURE — G8427 DOCREV CUR MEDS BY ELIG CLIN: HCPCS | Performed by: INTERNAL MEDICINE

## 2025-08-26 PROCEDURE — 1159F MED LIST DOCD IN RCRD: CPT | Performed by: INTERNAL MEDICINE

## 2025-08-26 PROCEDURE — 1123F ACP DISCUSS/DSCN MKR DOCD: CPT | Performed by: INTERNAL MEDICINE

## 2025-08-26 RX ORDER — METOPROLOL SUCCINATE 25 MG/1
25 TABLET, EXTENDED RELEASE ORAL DAILY
Qty: 90 TABLET | Refills: 3 | Status: SHIPPED | OUTPATIENT
Start: 2025-08-26

## 2025-08-26 RX ORDER — OLMESARTAN MEDOXOMIL 40 MG/1
TABLET ORAL
COMMUNITY
Start: 2025-08-25

## 2025-08-26 RX ORDER — LOSARTAN POTASSIUM 25 MG/1
12.5 TABLET ORAL DAILY
Qty: 45 TABLET | Refills: 3 | Status: SHIPPED | OUTPATIENT
Start: 2025-08-26

## 2025-09-05 ENCOUNTER — TELEPHONE (OUTPATIENT)
Dept: ENDOCRINOLOGY | Age: 75
End: 2025-09-05

## (undated) DEVICE — 18 GA N.G. KIT, 10 PACK: Brand: SITE-RITE

## (undated) DEVICE — COPILOT BLEEDBACK CONTROL VALVE: Brand: COPILOT

## (undated) DEVICE — SYRINGE 20ML LL S/C 50

## (undated) DEVICE — Device: Brand: OMNIWIRE PRESSURE GUIDE WIRE

## (undated) DEVICE — GAUZE,SPONGE,4"X4",16PLY,XRAY,STRL,LF: Brand: MEDLINE

## (undated) DEVICE — CATH BLLN ANGIO 2.25X12MM SC EUPHORA RX

## (undated) DEVICE — GLIDESHEATH SLENDER ACCESS KIT: Brand: GLIDESHEATH SLENDER

## (undated) DEVICE — DEVICE COMPR LNG 27 CM VASC BND

## (undated) DEVICE — GUIDEWIRE VASC J 0.035 INX300 CM INTERMED SHAPEABLE TIP WHLY

## (undated) DEVICE — GAUZE,SPONGE,4"X4",8PLY,STRL,LF,10/TRAY: Brand: MEDLINE

## (undated) DEVICE — GOWN,SIRUS,FABRNF,XL,20/CS: Brand: MEDLINE

## (undated) DEVICE — DRAPE,LAPAROTOMY,PCH,STERILE: Brand: MEDLINE

## (undated) DEVICE — GUIDEWIRE ENDOSCP L150CM DIA0.035IN TIP L15CM BENT PTFE

## (undated) DEVICE — TUBING PRSS MON L12IN PVC RIG NONEXPANDING M TO FEM CONN

## (undated) DEVICE — Device

## (undated) DEVICE — BLADE,STAINLESS-STEEL,11,STRL,DISPOSABLE: Brand: MEDLINE

## (undated) DEVICE — NEEDLE FLTR 18GA L1.5IN MEM THK5UM BLNT DISP

## (undated) DEVICE — ANGIOPLASTY ADD-ON PACK: Brand: MEDLINE INDUSTRIES, INC.

## (undated) DEVICE — PAD, DEFIB, ADULT, RADIOTRAN, PHYSIO, LO: Brand: MEDLINE

## (undated) DEVICE — CATHETER DIAG 5FR L100CM LUMN ID0.047IN JL3.5 CRV 0 SIDE H

## (undated) DEVICE — SYRINGE MED 10ML LUERLOCK TIP W/O SFTY DISP

## (undated) DEVICE — DOUBLE BASIN SET: Brand: MEDLINE INDUSTRIES, INC.

## (undated) DEVICE — INFLATION DEVICE KIT: Brand: ENCORE™ 26 ADVANTAGE KIT

## (undated) DEVICE — COVER,LIGHT HANDLE,FLX,2/PK: Brand: MEDLINE INDUSTRIES, INC.

## (undated) DEVICE — ANGIOGRAPHIC CATHETER: Brand: EXPO™

## (undated) DEVICE — 18GA (1.3MM OD: 1.0MM ID) X 7CM INTRODUCER18GA X 7CM NEEDLENEEDLE: Brand: INTRODUCER NEEDLEINTRODUCER NEEDLE

## (undated) DEVICE — MARKER,SKIN,WI/RULER AND LABELS: Brand: MEDLINE

## (undated) DEVICE — BLADE,STAINLESS-STEEL,15,STRL,DISPOSABLE: Brand: MEDLINE

## (undated) DEVICE — CATHETER DIAG 5FR L100CM LUMN ID0.047IN JR4 CRV 0 SIDE H

## (undated) DEVICE — DECANTER: Brand: UNBRANDED

## (undated) DEVICE — GLOVE SURG SZ 75 CRM LTX FREE POLYISOPRENE POLYMER BEAD ANTI

## (undated) DEVICE — APPLICATOR MEDICATED 26 CC SOLUTION HI LT ORNG CHLORAPREP

## (undated) DEVICE — COUNTER NDL 30 COUNT DBL MAG

## (undated) DEVICE — CATHETER GUID 5.5FR L150CM RAP EXCHG L25CM TIP 4.8FR PUSH

## (undated) DEVICE — COVER,TABLE,60X90,STERILE: Brand: MEDLINE

## (undated) DEVICE — KIT ANGIO W/ AT P65 PREM HND CTRL FOR CNTRST DEL ANGIOTOUCH

## (undated) DEVICE — MICROPUNCTURE INTRODUCER SET SILHOUETTE TRANSITIONLESS PUSH-PLUS DESIGN - STIFFENED CANNULA WITH STAINLESS STEEL WIRE GUIDE: Brand: MICROPUNCTURE

## (undated) DEVICE — BLADE CLIPPER GEN PURP NS

## (undated) DEVICE — DRAPE C ARM W41XL74IN UNIV MOB W RUBBERBAND CLP

## (undated) DEVICE — CANNULA NSL CANN NSL L25FT TBNG AD O2 SUP SFT UC

## (undated) DEVICE — NEEDLE HYPO 25GA L1.5IN BLU POLYPR HUB S STL REG BVL STR

## (undated) DEVICE — CATH BLLN ANGIO 3X20MM NC EUPHORIA RX

## (undated) DEVICE — KIT MFLD ISOLATN NACL CNTRST PRT TBNG SPIK W/ PRSS TRNSDUC

## (undated) DEVICE — GUNTHER TULIP VENA CAVA FILTER RETRIEVAL SET: Brand: GUNTHER TULIP

## (undated) DEVICE — CATHETER GUID 6FR L100CM DIA0.071IN NYL SHFT EBU3.5

## (undated) DEVICE — TOWEL,OR,DSP,ST,BLUE,STD,6/PK,12PK/CS: Brand: MEDLINE